# Patient Record
Sex: FEMALE | Race: BLACK OR AFRICAN AMERICAN | NOT HISPANIC OR LATINO
[De-identification: names, ages, dates, MRNs, and addresses within clinical notes are randomized per-mention and may not be internally consistent; named-entity substitution may affect disease eponyms.]

---

## 2019-08-14 ENCOUNTER — APPOINTMENT (OUTPATIENT)
Dept: PULMONOLOGY | Facility: CLINIC | Age: 64
End: 2019-08-14
Payer: COMMERCIAL

## 2019-08-14 ENCOUNTER — FORM ENCOUNTER (OUTPATIENT)
Age: 64
End: 2019-08-14

## 2019-08-14 VITALS
DIASTOLIC BLOOD PRESSURE: 78 MMHG | OXYGEN SATURATION: 96 % | HEART RATE: 64 BPM | SYSTOLIC BLOOD PRESSURE: 120 MMHG | RESPIRATION RATE: 16 BRPM | WEIGHT: 172 LBS | BODY MASS INDEX: 33.77 KG/M2 | HEIGHT: 59.65 IN | TEMPERATURE: 97.8 F

## 2019-08-14 PROCEDURE — 99204 OFFICE O/P NEW MOD 45 MIN: CPT

## 2019-08-14 NOTE — PHYSICAL EXAM
[General Appearance - Well Developed] : well developed [Normal Conjunctiva] : the conjunctiva exhibited no abnormalities [Normal Oropharynx] : normal oropharynx [Jugular Venous Distention Increased] : there was no jugular-venous distention [Heart Rate And Rhythm] : heart rate and rhythm were normal [Heart Sounds] : normal S1 and S2 [Murmurs] : no murmurs present [Edema] : no peripheral edema present [Respiration, Rhythm And Depth] : normal respiratory rhythm and effort [Abdomen Soft] : soft [Auscultation Breath Sounds / Voice Sounds] : lungs were clear to auscultation bilaterally [Abnormal Walk] : normal gait [Abdomen Tenderness] : non-tender [Nail Clubbing] : no clubbing of the fingernails [Motor Exam] : the motor exam was normal [] : no rash [Oriented To Time, Place, And Person] : oriented to person, place, and time [Erythema] : no erythema of the pharynx

## 2019-08-14 NOTE — REVIEW OF SYSTEMS
[Nasal Congestion] : nasal congestion [Postnasal Drip] : postnasal drip [Hypertension] : ~T hypertension [Heartburn] : heartburn [Reflux] : reflux [Negative] : Sleep Disorder [Fever] : no fever [Chills] : no chills [Chest Discomfort] : no chest discomfort [PND] : no PND [Orthopnea] : no orthopnea

## 2019-08-14 NOTE — HISTORY OF PRESENT ILLNESS
[FreeTextEntry1] : 63 yo woman with history of cryptogenic organizing pneumonia diagnosed in 2010 with VATS biopsy who comes for follow up. Last seen in 2014. She is been off prednisone since 2012. She started following at [facility] as it is close to her but comes today because she started experiencing cough again since January similar to when she presented with . No fever or chills. No shortness of breath. Reports cough is mostly dry. No clear trigger. Reports nasal congestion and postnasal drip and has tried antiallergic medications and Flonase with no significant relief, however has not been using the Flonase consistently. Also complains of heartburn and was started on ranitidine since May without improvement. No ACE inhibitors. No exposure to sick contacts.

## 2019-08-14 NOTE — ASSESSMENT
[FreeTextEntry1] : 63 yo woman with history of cryptogenic organizing pneumonia diagnosed in 2010 with VATS biopsy who comes for follow up. Off prednisone since 2012. Last seen in 2014 and then started following at [facility]. Comes today for evaluation of persistent cough since January. Reports symptoms of GERD and postnasal drip which are likely responsible for her symptoms. Will start treatment for both but also obtain PFTs and imaging given history of . \par \par Plan:\par - Start pantoprazole 40 mg BID instead of ranitidine given reported symptoms of GERD\par - Start Flonase 2 sprays in each nostril BID\par - Obtain pulmonary function tests\par - Obtain CT chest without contrast given history of cryptogenic organizing pneumonia\par \par Follow up after PFTs and CT chest is done

## 2019-08-15 ENCOUNTER — APPOINTMENT (OUTPATIENT)
Dept: PULMONOLOGY | Facility: CLINIC | Age: 64
End: 2019-08-15
Payer: COMMERCIAL

## 2019-08-15 ENCOUNTER — OTHER (OUTPATIENT)
Age: 64
End: 2019-08-15

## 2019-08-15 ENCOUNTER — LABORATORY RESULT (OUTPATIENT)
Age: 64
End: 2019-08-15

## 2019-08-15 ENCOUNTER — OUTPATIENT (OUTPATIENT)
Dept: OUTPATIENT SERVICES | Facility: HOSPITAL | Age: 64
LOS: 1 days | End: 2019-08-15
Payer: COMMERCIAL

## 2019-08-15 ENCOUNTER — APPOINTMENT (OUTPATIENT)
Dept: CT IMAGING | Facility: IMAGING CENTER | Age: 64
End: 2019-08-15
Payer: COMMERCIAL

## 2019-08-15 DIAGNOSIS — J84.116 CRYPTOGENIC ORGANIZING PNEUMONIA: ICD-10-CM

## 2019-08-15 PROCEDURE — 71250 CT THORAX DX C-: CPT

## 2019-08-15 PROCEDURE — 94729 DIFFUSING CAPACITY: CPT

## 2019-08-15 PROCEDURE — 71250 CT THORAX DX C-: CPT | Mod: 26

## 2019-08-15 PROCEDURE — 94726 PLETHYSMOGRAPHY LUNG VOLUMES: CPT

## 2019-08-15 PROCEDURE — 94060 EVALUATION OF WHEEZING: CPT

## 2019-08-15 NOTE — HISTORY OF PRESENT ILLNESS
[FreeTextEntry1] : Ms Case is a 59 yr old F with a PMH of  diagnosed in 2010 with a VATS biopsy, off steroids and supplemental O2 since 2012 presented for follow up yesterday 8/14/19 with recurrence of chronic cough that started 1/2019. Cough is dry and associated with dyspnea with exertion. Also has burning sensation in her chest after eating late at night is prescribed a PPI. Started taking Flonase as well. In early July she had a short course of levofloxacin and prednisone, which did not alleviate symptoms.\par \par Was sent for PFTs and CT chest yesterday for further evaluation. Compared to 2014, her PFTs are worse (see below). Her CT chest is also worse compared to 2014 but improved since her initial CT in 2010.\par \par 2014:\par FEV1 85% (1.56)\par FVC 75% (1.74)\par TLC 61% (2.34)\par DLCO 44%\par \par 2019\par FEV1 76% (1.33)\par FVC 67 (1.42)\par TLC 50% (1.95)\par DLCO 35%

## 2019-08-15 NOTE — PHYSICAL EXAM
[General Appearance - Well Developed] : well developed [Normal Conjunctiva] : the conjunctiva exhibited no abnormalities [Normal Oropharynx] : normal oropharynx [Jugular Venous Distention Increased] : there was no jugular-venous distention [Heart Rate And Rhythm] : heart rate and rhythm were normal [Murmurs] : no murmurs present [Heart Sounds] : normal S1 and S2 [Edema] : no peripheral edema present [Respiration, Rhythm And Depth] : normal respiratory rhythm and effort [Abdomen Soft] : soft [Abdomen Tenderness] : non-tender [Auscultation Breath Sounds / Voice Sounds] : lungs were clear to auscultation bilaterally [Abnormal Walk] : normal gait [Nail Clubbing] : no clubbing of the fingernails [Motor Exam] : the motor exam was normal [] : no rash [Oriented To Time, Place, And Person] : oriented to person, place, and time [Erythema] : no erythema of the pharynx

## 2019-08-15 NOTE — REVIEW OF SYSTEMS
[Nasal Congestion] : nasal congestion [Hypertension] : ~T hypertension [Postnasal Drip] : postnasal drip [Heartburn] : heartburn [Reflux] : reflux [Negative] : Sleep Disorder [Fever] : no fever [Chills] : no chills [Chest Discomfort] : no chest discomfort [PND] : no PND [Orthopnea] : no orthopnea

## 2019-08-15 NOTE — ASSESSMENT
[FreeTextEntry1] : 65 yo woman with history of cryptogenic organizing pneumonia diagnosed in 2010 with VATS biopsy who comes for follow up. Off prednisone since 2012. Last seen in 2014 and then started following at [facility]. Presented yesterday to re-establish care given chronic cough that has been ongoing since 1/2019. PFTs and CT chest concerning for recurrence of disease.\par \par Plan:\par - Continue PPI (omeprazole 40)\par - Flonase 2 sprays in each nostril BID\par - Start prednisone 30 mg daily x 2 weeks (tapering schedule to be determined based on clinical response)\par - Repeat PFTs in 2 weeks\par - Check CBC and CMP today\par \par Will also revaluate for hypersensitivity pneumonitis and connective tissue diseases\par \par Follow up visit in 2 weeks after PFT\par

## 2019-09-03 ENCOUNTER — LABORATORY RESULT (OUTPATIENT)
Age: 64
End: 2019-09-03

## 2019-09-03 ENCOUNTER — APPOINTMENT (OUTPATIENT)
Dept: PULMONOLOGY | Facility: CLINIC | Age: 64
End: 2019-09-03
Payer: COMMERCIAL

## 2019-09-03 VITALS — HEIGHT: 61 IN | WEIGHT: 176 LBS | BODY MASS INDEX: 33.23 KG/M2

## 2019-09-03 VITALS
HEIGHT: 61 IN | WEIGHT: 176 LBS | TEMPERATURE: 99.6 F | BODY MASS INDEX: 33.23 KG/M2 | SYSTOLIC BLOOD PRESSURE: 108 MMHG | HEART RATE: 78 BPM | RESPIRATION RATE: 16 BRPM | DIASTOLIC BLOOD PRESSURE: 72 MMHG

## 2019-09-03 DIAGNOSIS — F41.9 ANXIETY DISORDER, UNSPECIFIED: ICD-10-CM

## 2019-09-03 PROCEDURE — 99214 OFFICE O/P EST MOD 30 MIN: CPT | Mod: GC,25

## 2019-09-03 PROCEDURE — 94726 PLETHYSMOGRAPHY LUNG VOLUMES: CPT | Mod: GC

## 2019-09-03 PROCEDURE — 94729 DIFFUSING CAPACITY: CPT | Mod: GC

## 2019-09-03 PROCEDURE — 94010 BREATHING CAPACITY TEST: CPT | Mod: GC

## 2019-09-03 PROCEDURE — ZZZZZ: CPT

## 2019-09-03 NOTE — HISTORY OF PRESENT ILLNESS
[FreeTextEntry1] : 59F hx  (2010) w/VATS off steroids and O2 2012 who comes for follow up. Last seen in clinic on 8/15/2019. At that time, she was c/o cough since 1/2019. She started PPI for GERD and Flonase.  Also started on prednisone 30mg qd x 2 weeks. Labs drawn and are normal, except mild elevation in lymphocytes. Today she states that her symptoms improved after steroid taper (has been off for several days now). Cough had nearly resolved and pt did not notice much SOB with exertion. But since steroids have ended, cough and ROWLAND have returned. Has some sciatica pain with coughing but otherwise denies new weight loss/gain, fevers, chest pain, abdominal pain, nausea/vomiting, diarrhea, b/l LE edema, joint pain, new rashes. 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 No new pets. \par \par PFTs 8/15: FEV1/FVC 89%, FEV1 76%, FVC 67%, no bronchodilatory change, TLC 50%, ERV 0.18, DLCO 35%\par PFTs 2014: FEV1/FVC 90%, FEV1 85%, FVC 75%, no bronchodilatory change; TLC 61%, ERV 0.08, DLCO 44%\par \par CT Chest 8/16/2019: s/p wedge resection LLL and lingula, b/l GGO and nodular opacities  - peribronchovascular distribution involving all lobes predominantly LLLL and lingula; bronchiectasis of lingula and LLL.

## 2019-09-03 NOTE — ASSESSMENT
[FreeTextEntry1] : 59F hx  (2010) w/VATS off steroids and O2 2012 who comes for f/u of cough and deterioration of lung functions and CT chest.  Thought to be relapse of . Prev visit, she was started on steroids, which helped her cough/ROWLAND. Since coming off the steroids cough and ROWLAND has returned. CT Chest with worsening b/l GG and nodular opacities - likely return of her . PFTs remain about the same. No symptoms to suggest that she has a rheumatic DO. Has not recently moved, no mold infestation, pets or new job. Unclear what is inciting the recurrence. \par To look for possible  etiology\par - hypersensitivity panel\par - rheum workup\par - will restart prednisone 30mg x 1 week, then 20mg x 1 month\par - return to office w/PFTs prior to finishing steroid course

## 2019-09-03 NOTE — REVIEW OF SYSTEMS
[Cough] : cough [Sputum] : sputum  [Dyspnea] : dyspnea [Negative] : Psychiatric [Hemoptysis] : no hemoptysis [Chest Tightness] : no chest tightness [Pleuritic Pain] : no pleuritic pain [Wheezing] : no wheezing

## 2019-09-03 NOTE — PHYSICAL EXAM
[Normal Appearance] : normal appearance [General Appearance - Well Developed] : well developed [Normal Conjunctiva] : the conjunctiva exhibited no abnormalities [General Appearance - Well Nourished] : well nourished [Neck Appearance] : the appearance of the neck was normal [Heart Rate And Rhythm] : heart rate and rhythm were normal [Jugular Venous Distention Increased] : there was no jugular-venous distention [Heart Sounds] : normal S1 and S2 [Murmurs] : no murmurs present [Auscultation Breath Sounds / Voice Sounds] : lungs were clear to auscultation bilaterally [Abdomen Soft] : soft [Bowel Sounds] : normal bowel sounds [Abdomen Tenderness] : non-tender [Nail Clubbing] : no clubbing of the fingernails [Abnormal Walk] : normal gait [Cyanosis, Localized] : no localized cyanosis [] : no rash [Skin Turgor] : normal skin turgor [Motor Exam] : the motor exam was normal [No Focal Deficits] : no focal deficits [Oriented To Time, Place, And Person] : oriented to person, place, and time [Mood] : the mood was normal

## 2019-10-01 ENCOUNTER — APPOINTMENT (OUTPATIENT)
Dept: PULMONOLOGY | Facility: CLINIC | Age: 64
End: 2019-10-01
Payer: COMMERCIAL

## 2019-10-01 VITALS — WEIGHT: 176 LBS | HEIGHT: 61 IN | HEART RATE: 67 BPM | OXYGEN SATURATION: 94 % | BODY MASS INDEX: 33.23 KG/M2

## 2019-10-01 VITALS
TEMPERATURE: 98.4 F | DIASTOLIC BLOOD PRESSURE: 86 MMHG | HEART RATE: 74 BPM | SYSTOLIC BLOOD PRESSURE: 124 MMHG | BODY MASS INDEX: 33.61 KG/M2 | HEIGHT: 61 IN | OXYGEN SATURATION: 93 % | RESPIRATION RATE: 15 BRPM | WEIGHT: 178 LBS

## 2019-10-01 PROCEDURE — 99214 OFFICE O/P EST MOD 30 MIN: CPT | Mod: 25

## 2019-10-01 PROCEDURE — 94729 DIFFUSING CAPACITY: CPT

## 2019-10-01 PROCEDURE — 94010 BREATHING CAPACITY TEST: CPT

## 2019-10-01 PROCEDURE — ZZZZZ: CPT

## 2019-10-01 PROCEDURE — 94726 PLETHYSMOGRAPHY LUNG VOLUMES: CPT

## 2019-10-01 NOTE — HISTORY OF PRESENT ILLNESS
[FreeTextEntry1] : 63yo female with h/o  diagnosed by lung biopsy with VATS (2010) off steroids and supplemental oxygen (2012).  Presents today with cough since January 2019.  Started on PPI for GERD and Flonase.  Blood work for hypersensitivity and rheum w/u unremarkable.  Has been on prednisone 20mg for one month now.  Cough had resolved when initially at 30mg, but has returned since on 20mg.  Admits to dyspnea with brisk walking and going up an incline.  Has some sciatic pain, but no other systemic symptoms noted.\par \par CT Chest 8/16/2019: \par s/p wedge resection LLL and lingula, b/l GGO and nodular opacities  - peribronchovascular distribution involving all lobes predominantly LLL and lingula; bronchiectasis of lingula and LLL.

## 2019-10-01 NOTE — ASSESSMENT
[FreeTextEntry1] : 65yo female with h/o  diagnosed by lung biopsy with VATS (2010) off steroids and supplemental oxygen (2012).  Thought to be relapse of .  Has been on prednisone 20mg x1 month now, but still with cough and some exertional dyspnea.  She waked 320 feet and desaturated from 95-87%, but resaturated to 95% upon resting for 30 seconds.  CT chest with worsening b/l ground glass and nodular opacities, thought to be return of , though no obvious causes at this time.  PFT today unchanged from last month, despite prednisone.  Fluticasone and omeprazole reordered today.  Plan to taper prednisone to 10mg QD x2 weeks, then 10mg QOD x2 weeks, then stop.  F/u in 5 weeks with repeat chest CT at that time.

## 2019-10-01 NOTE — PHYSICAL EXAM
[Normal Appearance] : normal appearance [Normal Conjunctiva] : the conjunctiva exhibited no abnormalities [Neck Appearance] : the appearance of the neck was normal [Heart Rate And Rhythm] : heart rate and rhythm were normal [Heart Sounds] : normal S1 and S2 [Murmurs] : no murmurs present [Auscultation Breath Sounds / Voice Sounds] : lungs were clear to auscultation bilaterally [Bowel Sounds] : normal bowel sounds [Abdomen Soft] : soft [Abdomen Tenderness] : non-tender [Abnormal Walk] : normal gait [Nail Clubbing] : no clubbing of the fingernails [Cyanosis, Localized] : no localized cyanosis [Skin Turgor] : normal skin turgor [] : no rash [Motor Exam] : the motor exam was normal [No Focal Deficits] : no focal deficits [Oriented To Time, Place, And Person] : oriented to person, place, and time [Mood] : the mood was normal [General Appearance - In No Acute Distress] : no acute distress [Normal Oropharynx] : normal oropharynx [Arterial Pulses Normal] : the arterial pulses were normal [Edema] : no peripheral edema present [Respiration, Rhythm And Depth] : normal respiratory rhythm and effort [Exaggerated Use Of Accessory Muscles For Inspiration] : no accessory muscle use

## 2019-11-05 ENCOUNTER — APPOINTMENT (OUTPATIENT)
Dept: PULMONOLOGY | Facility: CLINIC | Age: 64
End: 2019-11-05
Payer: COMMERCIAL

## 2019-11-05 VITALS
OXYGEN SATURATION: 90 % | HEART RATE: 96 BPM | BODY MASS INDEX: 33.23 KG/M2 | SYSTOLIC BLOOD PRESSURE: 119 MMHG | TEMPERATURE: 97 F | WEIGHT: 176 LBS | RESPIRATION RATE: 18 BRPM | HEIGHT: 61 IN | DIASTOLIC BLOOD PRESSURE: 78 MMHG

## 2019-11-05 DIAGNOSIS — K21.9 GASTRO-ESOPHAGEAL REFLUX DISEASE W/OUT ESOPHAGITIS: ICD-10-CM

## 2019-11-05 PROCEDURE — 99215 OFFICE O/P EST HI 40 MIN: CPT | Mod: GC

## 2019-11-05 NOTE — REVIEW OF SYSTEMS
[Cough] : cough [Dyspnea] : dyspnea [Negative] : Endocrine [Fever] : no fever [Chills] : no chills [Fatigue] : no fatigue [Sputum] : not coughing up ~M sputum

## 2019-11-05 NOTE — PHYSICAL EXAM
[General Appearance - Well Developed] : well developed [Normal Appearance] : normal appearance [General Appearance - Well Nourished] : well nourished [Normal Conjunctiva] : the conjunctiva exhibited no abnormalities [Normal Oropharynx] : normal oropharynx [Neck Cervical Mass (___cm)] : no neck mass was observed [Jugular Venous Distention Increased] : there was no jugular-venous distention [Heart Rate And Rhythm] : heart rate and rhythm were normal [Heart Sounds] : normal S1 and S2 [Murmurs] : no murmurs present [Auscultation Breath Sounds / Voice Sounds] : lungs were clear to auscultation bilaterally [Abdomen Soft] : soft [Abdomen Tenderness] : non-tender [Abnormal Walk] : normal gait [Nail Clubbing] : no clubbing of the fingernails [Cyanosis, Localized] : no localized cyanosis [Skin Turgor] : normal skin turgor [] : no rash [Motor Exam] : the motor exam was normal [No Focal Deficits] : no focal deficits [Oriented To Time, Place, And Person] : oriented to person, place, and time [Mood] : the mood was normal [FreeTextEntry2] : no b/l LE edema

## 2019-11-05 NOTE — HISTORY OF PRESENT ILLNESS
[FreeTextEntry1] : 64F hx  (dx'ed 2010) req steroids and O2, who comes for follow up. Last seen in 10/1/2019 - at that time she was on 20mg prednisone. She desaturated with exertion to 87%. Today she states that her coughing is bothering her, ROWLAND with 1 flight of stairs and talking. Cough is usually non-productive but can be yellowish at times. On prednisone 10mg q48h. PCP recently gave her codeine for cough, which was helpful. Found that there is no difference in her symptoms between 10mg q48h and 20mg qd. Cough worse in AM/PM and when she talks or walks. Denies fevers, chills, chest pain, SOB, abdominal pain, nausea/vomiting, diarrhea, b/l LE edema.\par \par CT Chest 8/16/2019: s/p wedge resection of LLL, lingula. b/l GGO and nodular opacities peribronchial vascular distribution involving all lobes, predominantly LLL and lingula. Bronchiectasis lingula and LLL. \par PFTs 10/1/2019: FEV1/FVC 86%, FEV1 73%, FVC 66%, TLC 49%, DLCO 41%

## 2019-11-05 NOTE — ASSESSMENT
[FreeTextEntry1] : 64F hx  (dx'ed 2010) req steroids and O2, who comes for follow up. Prednisone 20mg now tapered to 10mg q48hr, no difference in symptoms. Still having cough, SOB/ROWLAND. CT chest appears worse. PFTs have not changed much. Pt still hypoxic to 90% after walking from waiting area to exam room. Concern for recurrent , not improving with steroids. There have been some case studies showing efficacy of azithromycin for recurrent  ("Macrolide therapy in cryptogenic organizing pneumonia: A case report and literature review" by Christopher et al). \par - will try azithromycin 500 mg qd\par - refilled omeprazole for reflux\par - trial of flonase

## 2019-12-03 ENCOUNTER — APPOINTMENT (OUTPATIENT)
Dept: PULMONOLOGY | Facility: CLINIC | Age: 64
End: 2019-12-03
Payer: COMMERCIAL

## 2019-12-03 VITALS
BODY MASS INDEX: 33.35 KG/M2 | TEMPERATURE: 97.4 F | RESPIRATION RATE: 17 BRPM | SYSTOLIC BLOOD PRESSURE: 109 MMHG | HEART RATE: 83 BPM | OXYGEN SATURATION: 95 % | WEIGHT: 176.5 LBS | DIASTOLIC BLOOD PRESSURE: 68 MMHG

## 2019-12-03 PROCEDURE — 99215 OFFICE O/P EST HI 40 MIN: CPT

## 2019-12-03 RX ORDER — OMEPRAZOLE 40 MG/1
40 CAPSULE, DELAYED RELEASE ORAL
Qty: 30 | Refills: 2 | Status: ACTIVE | COMMUNITY
Start: 2019-08-14 | End: 1900-01-01

## 2019-12-03 RX ORDER — PREDNISONE 10 MG/1
10 TABLET ORAL
Qty: 50 | Refills: 0 | Status: DISCONTINUED | COMMUNITY
Start: 2019-08-15 | End: 2019-12-03

## 2019-12-03 RX ORDER — PREDNISONE 10 MG/1
10 TABLET ORAL
Qty: 100 | Refills: 1 | Status: ACTIVE | COMMUNITY
Start: 2019-12-03 | End: 1900-01-01

## 2019-12-03 NOTE — HISTORY OF PRESENT ILLNESS
[FreeTextEntry1] : 65yo female with h/o  diagnosed by lung biopsy with VATS (2019) off steroids and supplemental oxygen (2012). Last seen in 11/5/19.  At that time, prednisone had been discontinued due to lack of response to therapy.  She was started on trial of azithromycin 500mg QD for possible recurrent .  Additionally, she was placed on omeprazole for GERD and Flonase for UAC, which she stopped using.  She does not feel any difference in the characters tics of her cough.  It is mostly dry and intermittent throughout the day.  It is worse with talking and associated with a tickle in her throat.  Also reports dyspnea that she feels is worse from last month.  Recent  cardiac w/u unremarkable, although we currently have no records on file.\par \par ILD blood workup was unremarkable except for a mildly elevated ESR.  PFT showed moderately restrictive pattern with a severely reduced diffusing capacity.

## 2019-12-03 NOTE — PHYSICAL EXAM
[Normal Appearance] : normal appearance [General Appearance - Well Developed] : well developed [General Appearance - Well Nourished] : well nourished [Normal Conjunctiva] : the conjunctiva exhibited no abnormalities [Normal Oropharynx] : normal oropharynx [Heart Rate And Rhythm] : heart rate and rhythm were normal [Murmurs] : no murmurs present [Heart Sounds] : normal S1 and S2 [Auscultation Breath Sounds / Voice Sounds] : lungs were clear to auscultation bilaterally [Abnormal Walk] : normal gait [Abdomen Soft] : soft [Abdomen Tenderness] : non-tender [Cyanosis, Localized] : no localized cyanosis [Nail Clubbing] : no clubbing of the fingernails [] : no rash [Skin Turgor] : normal skin turgor [Oriented To Time, Place, And Person] : oriented to person, place, and time [Motor Exam] : the motor exam was normal [No Focal Deficits] : no focal deficits [Mood] : the mood was normal [General Appearance - In No Acute Distress] : no acute distress [Arterial Pulses Normal] : the arterial pulses were normal [Edema] : no peripheral edema present [Exaggerated Use Of Accessory Muscles For Inspiration] : no accessory muscle use [Respiration, Rhythm And Depth] : normal respiratory rhythm and effort [Affect] : the affect was normal

## 2019-12-03 NOTE — ASSESSMENT
[FreeTextEntry1] : 65yo female with h/o  diagnosed by lung biopsy with VATS (2010). Treated with steroids; off steroids and supplemental oxygen (2012).  Currently being seen for dry cough with recent chest imaging suggestive of recurrence of , which has not responded to steroids as demonstrated on most recent PFTs.  She was on prednisone 30mg x2 weeks then dropped to 20mg and then 10mg over the course of one month.  Will plan to restart prednisone at 30mg x4 weeks with repeat PFT and f/u at that time.  May need slower taper.  Continue with omeprazole and Flonase.\par Azithromycin of no benefit\par Concern that  is not responding to treatment

## 2019-12-05 ENCOUNTER — RX RENEWAL (OUTPATIENT)
Age: 64
End: 2019-12-05

## 2019-12-19 ENCOUNTER — APPOINTMENT (OUTPATIENT)
Dept: PULMONOLOGY | Facility: CLINIC | Age: 64
End: 2019-12-19
Payer: COMMERCIAL

## 2019-12-19 VITALS
WEIGHT: 174 LBS | RESPIRATION RATE: 16 BRPM | SYSTOLIC BLOOD PRESSURE: 117 MMHG | HEIGHT: 61 IN | BODY MASS INDEX: 32.85 KG/M2 | OXYGEN SATURATION: 96 % | HEART RATE: 72 BPM | DIASTOLIC BLOOD PRESSURE: 74 MMHG

## 2019-12-19 PROCEDURE — 99213 OFFICE O/P EST LOW 20 MIN: CPT | Mod: GC

## 2019-12-19 NOTE — PHYSICAL EXAM
[General Appearance - Well Developed] : well developed [Normal Appearance] : normal appearance [General Appearance - Well Nourished] : well nourished [General Appearance - In No Acute Distress] : no acute distress [Normal Conjunctiva] : the conjunctiva exhibited no abnormalities [Normal Oropharynx] : normal oropharynx [Heart Rate And Rhythm] : heart rate and rhythm were normal [Heart Sounds] : normal S1 and S2 [Murmurs] : no murmurs present [Arterial Pulses Normal] : the arterial pulses were normal [Edema] : no peripheral edema present [Respiration, Rhythm And Depth] : normal respiratory rhythm and effort [Exaggerated Use Of Accessory Muscles For Inspiration] : no accessory muscle use [Auscultation Breath Sounds / Voice Sounds] : lungs were clear to auscultation bilaterally [Abdomen Soft] : soft [Abdomen Tenderness] : non-tender [Abnormal Walk] : normal gait [Nail Clubbing] : no clubbing of the fingernails [Cyanosis, Localized] : no localized cyanosis [Skin Turgor] : normal skin turgor [] : no rash [Motor Exam] : the motor exam was normal [No Focal Deficits] : no focal deficits [Oriented To Time, Place, And Person] : oriented to person, place, and time [Affect] : the affect was normal [Mood] : the mood was normal

## 2019-12-19 NOTE — HISTORY OF PRESENT ILLNESS
[FreeTextEntry1] : 64 F with history of  diagnosed by lung biopsy with VATS (2010) off steroids and supplemental oxygen (2012). Lost to follow up but presented again with recurrent symptoms. Last visit was 12/3/19 during which time she was restarted on prednisone 30 mg daily. Has been taking every day and reports cough is improved but she remains dyspneic. \par \par She has been on prednisone multiple times in past with limited benefit. She was also on trial of azithromycin 500mg QD for recurrent .  Additionally, placed on omeprazole for GERD and Flonase for UAC, which she stopped using. Had recent cardiac w/u unremarkable, although we currently have no records on file (was done at [facility] per patinent).\par \par ILD blood workup was unremarkable except for a mildly elevated ESR.  PFT showed moderately restrictive pattern with a severely reduced diffusing capacity.

## 2019-12-19 NOTE — ASSESSMENT
[FreeTextEntry1] : 64 F with h/o  diagnosed by lung biopsy with VATS (2010).  Recently restarted prednisone 30 mg daily 12/3/19. Cough improved but still dyspneic. \par \par Will need to recheck PFTs prior to tapering steroids\par Filled out forms for patient today re: FMLA\par Follow up visit end of month or early January for repeat PFTs

## 2019-12-23 ENCOUNTER — MESSAGE (OUTPATIENT)
Age: 64
End: 2019-12-23

## 2020-01-14 ENCOUNTER — EMERGENCY (EMERGENCY)
Facility: HOSPITAL | Age: 65
LOS: 1 days | Discharge: HOME | End: 2020-01-14
Attending: EMERGENCY MEDICINE | Admitting: EMERGENCY MEDICINE
Payer: COMMERCIAL

## 2020-01-14 ENCOUNTER — APPOINTMENT (OUTPATIENT)
Dept: PULMONOLOGY | Facility: CLINIC | Age: 65
End: 2020-01-14
Payer: COMMERCIAL

## 2020-01-14 VITALS
SYSTOLIC BLOOD PRESSURE: 127 MMHG | HEART RATE: 59 BPM | DIASTOLIC BLOOD PRESSURE: 61 MMHG | RESPIRATION RATE: 18 BRPM | TEMPERATURE: 98 F | OXYGEN SATURATION: 100 %

## 2020-01-14 VITALS
TEMPERATURE: 98 F | RESPIRATION RATE: 20 BRPM | HEART RATE: 93 BPM | DIASTOLIC BLOOD PRESSURE: 73 MMHG | OXYGEN SATURATION: 98 % | SYSTOLIC BLOOD PRESSURE: 158 MMHG

## 2020-01-14 LAB
ALBUMIN SERPL ELPH-MCNC: 4 G/DL — SIGNIFICANT CHANGE UP (ref 3.3–5)
ALP SERPL-CCNC: 54 U/L — SIGNIFICANT CHANGE UP (ref 40–120)
ALT FLD-CCNC: 32 U/L — SIGNIFICANT CHANGE UP (ref 4–33)
ANION GAP SERPL CALC-SCNC: 12 MMO/L — SIGNIFICANT CHANGE UP (ref 7–14)
AST SERPL-CCNC: 22 U/L — SIGNIFICANT CHANGE UP (ref 4–32)
BASOPHILS # BLD AUTO: 0.01 K/UL — SIGNIFICANT CHANGE UP (ref 0–0.2)
BASOPHILS NFR BLD AUTO: 0.1 % — SIGNIFICANT CHANGE UP (ref 0–2)
BILIRUB SERPL-MCNC: 0.2 MG/DL — SIGNIFICANT CHANGE UP (ref 0.2–1.2)
BUN SERPL-MCNC: 14 MG/DL — SIGNIFICANT CHANGE UP (ref 7–23)
CALCIUM SERPL-MCNC: 9.4 MG/DL — SIGNIFICANT CHANGE UP (ref 8.4–10.5)
CHLORIDE SERPL-SCNC: 103 MMOL/L — SIGNIFICANT CHANGE UP (ref 98–107)
CO2 SERPL-SCNC: 23 MMOL/L — SIGNIFICANT CHANGE UP (ref 22–31)
CREAT SERPL-MCNC: 1.07 MG/DL — SIGNIFICANT CHANGE UP (ref 0.5–1.3)
EOSINOPHIL # BLD AUTO: 0.16 K/UL — SIGNIFICANT CHANGE UP (ref 0–0.5)
EOSINOPHIL NFR BLD AUTO: 1.9 % — SIGNIFICANT CHANGE UP (ref 0–6)
GLUCOSE SERPL-MCNC: 147 MG/DL — HIGH (ref 70–99)
HCT VFR BLD CALC: 43 % — SIGNIFICANT CHANGE UP (ref 34.5–45)
HGB BLD-MCNC: 13.2 G/DL — SIGNIFICANT CHANGE UP (ref 11.5–15.5)
IMM GRANULOCYTES NFR BLD AUTO: 0.5 % — SIGNIFICANT CHANGE UP (ref 0–1.5)
LYMPHOCYTES # BLD AUTO: 1.01 K/UL — SIGNIFICANT CHANGE UP (ref 1–3.3)
LYMPHOCYTES # BLD AUTO: 11.9 % — LOW (ref 13–44)
MCHC RBC-ENTMCNC: 23.8 PG — LOW (ref 27–34)
MCHC RBC-ENTMCNC: 30.7 % — LOW (ref 32–36)
MCV RBC AUTO: 77.6 FL — LOW (ref 80–100)
MONOCYTES # BLD AUTO: 0.28 K/UL — SIGNIFICANT CHANGE UP (ref 0–0.9)
MONOCYTES NFR BLD AUTO: 3.3 % — SIGNIFICANT CHANGE UP (ref 2–14)
NEUTROPHILS # BLD AUTO: 6.98 K/UL — SIGNIFICANT CHANGE UP (ref 1.8–7.4)
NEUTROPHILS NFR BLD AUTO: 82.3 % — HIGH (ref 43–77)
NRBC # FLD: 0 K/UL — SIGNIFICANT CHANGE UP (ref 0–0)
NT-PROBNP SERPL-SCNC: 17.05 PG/ML — SIGNIFICANT CHANGE UP
PLATELET # BLD AUTO: 202 K/UL — SIGNIFICANT CHANGE UP (ref 150–400)
PMV BLD: 10.4 FL — SIGNIFICANT CHANGE UP (ref 7–13)
POTASSIUM SERPL-MCNC: 4.4 MMOL/L — SIGNIFICANT CHANGE UP (ref 3.5–5.3)
POTASSIUM SERPL-SCNC: 4.4 MMOL/L — SIGNIFICANT CHANGE UP (ref 3.5–5.3)
PROT SERPL-MCNC: 6.9 G/DL — SIGNIFICANT CHANGE UP (ref 6–8.3)
RBC # BLD: 5.54 M/UL — HIGH (ref 3.8–5.2)
RBC # FLD: 15.4 % — HIGH (ref 10.3–14.5)
SODIUM SERPL-SCNC: 138 MMOL/L — SIGNIFICANT CHANGE UP (ref 135–145)
TROPONIN T, HIGH SENSITIVITY: < 6 NG/L — SIGNIFICANT CHANGE UP (ref ?–14)
WBC # BLD: 8.48 K/UL — SIGNIFICANT CHANGE UP (ref 3.8–10.5)
WBC # FLD AUTO: 8.48 K/UL — SIGNIFICANT CHANGE UP (ref 3.8–10.5)

## 2020-01-14 PROCEDURE — 94726 PLETHYSMOGRAPHY LUNG VOLUMES: CPT

## 2020-01-14 PROCEDURE — 71250 CT THORAX DX C-: CPT | Mod: 26

## 2020-01-14 PROCEDURE — 99284 EMERGENCY DEPT VISIT MOD MDM: CPT

## 2020-01-14 PROCEDURE — 94060 EVALUATION OF WHEEZING: CPT

## 2020-01-14 PROCEDURE — 94729 DIFFUSING CAPACITY: CPT

## 2020-01-14 PROCEDURE — 71046 X-RAY EXAM CHEST 2 VIEWS: CPT | Mod: 26

## 2020-01-14 NOTE — ED PROVIDER NOTE - CLINICAL SUMMARY MEDICAL DECISION MAKING FREE TEXT BOX
64F hx cryptogenic organizing pna on steroids presents with worsening sob, exacerbated today while getting PFTs and exerting herself. some assoc chest tightness. Likely exacerbation of the chronic Cryptogenic organizing pna. Plan: Cardiac Monitor, EKG, Labs/cardiac enzymes, CXR

## 2020-01-14 NOTE — ED PROVIDER NOTE - ATTENDING CONTRIBUTION TO CARE
komal: pt with past hx as noted. Was having PFTS today and noted increasing shortness of breath during the procedure. No other new sx. Pt has months of increased shortness of breath for which she is being followed by the pulmonary serivce. At time of evaluation, pt stated she was at baseline for shortness of breath. Can walk and did so inED; noted mild dyspnea with short distances (noted in OCtober 2019).   CXR did not reveal any new dx; given hx Ct chest obtained and plan was to contact pulmonary  when results return.

## 2020-01-14 NOTE — ED PROVIDER NOTE - PATIENT PORTAL LINK FT
You can access the FollowMyHealth Patient Portal offered by [facility] by registering at the following website: [email or web address]. By joining [facility]’s FollowMyHealth portal, you will also be able to view your health information using other applications (apps) compatible with our system.

## 2020-01-14 NOTE — ED PROVIDER NOTE - PROGRESS NOTE DETAILS
CASSIDY MANNING: pt states that shes feeling better, ct scan shows no PNA, she will be d/c and f/u with her pulm in the AM

## 2020-01-14 NOTE — ED ADULT NURSE NOTE - OBJECTIVE STATEMENT
Patient received to room 27, A&Ox4, respirations even and unlabored, skin warm and dry good for color, speaks in clear and full sentences. Patient has hx of cryptogenic organizing pneumonia, had lung test today, reports feels SOB, weakness and chest pressure since test. Denies chest pain, nausea, vomiting, fevers. Provider currently at bedside assessing patient.

## 2020-01-14 NOTE — ED PROVIDER NOTE - OBJECTIVE STATEMENT
64F hx cryptogenic organizing pna on steroids presents with worsening sob, exacerbated today while getting PFTs and exerting herself. some assoc chest tightness. Chest pain is not pleuritic in nature.  Denies palpitations, hemoptysis, leg swelling or pain, recent immobilization, recent travel, recent surgery or hospitalization, exogenous hormone use such as OCP, IUD or Injections.  Non-smoker.  Denies history of blood clots or clotting disorder.  Denies family history of blood clots or clotting disorder.

## 2020-01-21 RX ORDER — PREDNISONE 10 MG/1
10 TABLET ORAL
Qty: 200 | Refills: 1 | Status: DISCONTINUED | COMMUNITY
Start: 2019-09-03 | End: 2020-01-21

## 2020-02-27 ENCOUNTER — APPOINTMENT (OUTPATIENT)
Dept: PULMONOLOGY | Facility: CLINIC | Age: 65
End: 2020-02-27
Payer: COMMERCIAL

## 2020-02-27 VITALS
BODY MASS INDEX: 33.07 KG/M2 | WEIGHT: 175 LBS | OXYGEN SATURATION: 93 % | DIASTOLIC BLOOD PRESSURE: 86 MMHG | SYSTOLIC BLOOD PRESSURE: 153 MMHG | TEMPERATURE: 98.2 F | RESPIRATION RATE: 16 BRPM | HEART RATE: 93 BPM

## 2020-02-27 PROCEDURE — 99214 OFFICE O/P EST MOD 30 MIN: CPT | Mod: GC

## 2020-02-27 RX ORDER — AZITHROMYCIN 500 MG/1
500 TABLET, FILM COATED ORAL DAILY
Qty: 30 | Refills: 2 | Status: DISCONTINUED | COMMUNITY
Start: 2019-11-05 | End: 2020-02-27

## 2020-02-27 NOTE — PHYSICAL EXAM
[No Deformities] : no deformities [No Acute Distress] : no acute distress [Normal Oropharynx] : normal oropharynx [No Neck Mass] : no neck mass [Normal Appearance] : normal appearance [Normal Rate/Rhythm] : normal rate/rhythm [No Clubbing] : no clubbing [Normal S1, S2] : normal s1, s2 [No Edema] : no edema [TextBox_[id number]] : crackles in lower fields bilaterally

## 2020-02-27 NOTE — REVIEW OF SYSTEMS
[Cough] : cough [Dyspnea] : dyspnea [SOB on Exertion] : sob on exertion [Arthralgias] : arthralgias [Back Pain] : back pain [Chills] : no chills [Fever] : no fever [Eye Irritation] : no eye irritation [Sinus Problems] : no sinus problems [Sputum] : no sputum [Chest Discomfort] : no chest discomfort [Orthopnea] : no orthopnea [GERD] : no gerd [Abdominal Pain] : no abdominal pain [Raynaud] : no raynaud [Depression] : no depression [Anxiety] : no anxiety

## 2020-02-27 NOTE — HISTORY OF PRESENT ILLNESS
[TextBox_4] : 64 F with history of  diagnosed by lung biopsy with VATS (2010) off steroids and supplemental oxygen (2012). Lost to follow up but presented again with recurrent symptoms. Patient was previously on prednisone 30mg but reported only minimal improvemetn in her symptoms and was concerned regarding adverse effects of steroids. She called 1/21 and was advised to decrease the dose of prednsone. She took a lower dose the following day but then self discontinued prednisone. Since then her symptoms have been relatively stable but still with significant exertional dyspnea. Needs to rest after 6 steps. She also notes dry cough which is slightly improved. Her repeat PFTs show mild improvement but still with evidence of moderate restrictive ventilatory impairment with severely decreased DLCO. \par \par PCP: Luci Leblanc\par Phone [phone]; Fax [phone]

## 2020-03-06 ENCOUNTER — APPOINTMENT (OUTPATIENT)
Dept: PULMONOLOGY | Facility: CLINIC | Age: 65
End: 2020-03-06
Payer: COMMERCIAL

## 2020-03-06 VITALS
BODY MASS INDEX: 33.11 KG/M2 | WEIGHT: 175.25 LBS | RESPIRATION RATE: 16 BRPM | HEART RATE: 96 BPM | OXYGEN SATURATION: 97 % | DIASTOLIC BLOOD PRESSURE: 86 MMHG | SYSTOLIC BLOOD PRESSURE: 145 MMHG | TEMPERATURE: 98 F

## 2020-03-06 PROCEDURE — 99213 OFFICE O/P EST LOW 20 MIN: CPT

## 2020-03-06 RX ORDER — PROMETHAZINE HYDROCHLORIDE AND CODEINE PHOSPHATE 6.25; 1 MG/5ML; MG/5ML
6.25-1 SOLUTION ORAL
Qty: 120 | Refills: 0 | Status: ACTIVE | COMMUNITY
Start: 2019-10-10

## 2020-03-06 RX ORDER — BENZONATATE 100 MG/1
100 CAPSULE ORAL
Qty: 30 | Refills: 0 | Status: ACTIVE | COMMUNITY
Start: 2020-02-05

## 2020-03-06 NOTE — REASON FOR VISIT
[Follow-Up] : a follow-up visit [Family Member] : family member [TextBox_44] : cryptogenic organizing pneumonia

## 2020-03-06 NOTE — PHYSICAL EXAM
[No Acute Distress] : no acute distress [Normal Appearance] : normal appearance [No Neck Mass] : no neck mass [Normal Rate/Rhythm] : normal rate/rhythm [Normal S1, S2] : normal s1, s2 [No Resp Distress] : no resp distress [Clear to Auscultation Bilaterally] : clear to auscultation bilaterally

## 2020-03-06 NOTE — HISTORY OF PRESENT ILLNESS
[TextBox_4] : 64-year-old female returns reporting improvement in her cough and dyspnea on prednisone. I restarted her on 15 mg of prednisone and the patient reports that she feels better. She reports that her cough and dyspnea have improved. She remains afebrile.

## 2020-03-06 NOTE — ASSESSMENT
[FreeTextEntry1] : the patient's resting oxygen saturation was 96%. After walking almost 200 feet, her oxygen saturations were 88% which is much better than when seen previously.\par She will maintain herself on 15 mg of prednisone and I will clear her to go back to work.

## 2020-05-19 ENCOUNTER — APPOINTMENT (OUTPATIENT)
Dept: PULMONOLOGY | Facility: CLINIC | Age: 65
End: 2020-05-19

## 2020-06-08 ENCOUNTER — APPOINTMENT (OUTPATIENT)
Dept: PULMONOLOGY | Facility: CLINIC | Age: 65
End: 2020-06-08
Payer: COMMERCIAL

## 2020-06-08 ENCOUNTER — APPOINTMENT (OUTPATIENT)
Dept: PULMONOLOGY | Facility: CLINIC | Age: 65
End: 2020-06-08

## 2020-06-08 DIAGNOSIS — M19.90 UNSPECIFIED OSTEOARTHRITIS, UNSPECIFIED SITE: ICD-10-CM

## 2020-06-08 PROCEDURE — 99213 OFFICE O/P EST LOW 20 MIN: CPT | Mod: 95

## 2020-06-08 RX ORDER — PREDNISONE 5 MG/1
5 TABLET ORAL
Qty: 30 | Refills: 1 | Status: ACTIVE | COMMUNITY
Start: 2020-06-08 | End: 1900-01-01

## 2020-06-08 NOTE — HISTORY OF PRESENT ILLNESS
[TextBox_4] : 64-year-old female with cryptogenic organizing pneumonia presents with cough and dyspnea on exertion. She is currently on prednisone\par 5 mg po daily for the last month.  Previously, in March was taking 15 mg po daily for cough but her symptoms have improved.  She does\par report dyspnea when she climbs 2 flights of stairs and a chronic cough.  She reports that it feels as though phlegm is getting stuck in\par her throat when she coughs at times.\par She has been back to work for the past month.  \par She denies fever, chills, chest tightness, wheezing, chest pain.  \par She does have b/l chronic leg pain and sciatica; has had an epidural in the past.  She felt the prednisone in higher doses was alleviating\par the pain and is now returning since she has decreased the dose.  Will follow up with her PCP and is also following neurosurgery.

## 2020-06-08 NOTE — REASON FOR VISIT
[Home] : at home, [unfilled] , at the time of the visit. [Medical Office: ([facility])___] : at the medical office located in  [Verbal consent obtained from patient] : the patient, [unfilled] [Family Member] : family member [Follow-Up] : a follow-up visit [TextBox_44] : cryptogenic organizing pneumonia

## 2020-06-08 NOTE — ASSESSMENT
[FreeTextEntry1] : 65 year old female with cryptogenic organizing pneumonia presents with chronic cough.\par \par Cryptogenic organizing pneumonia\par -decrease to prednisone 5 mg every other day- call in 2 weeks to provide update on symptoms\par -restart fluticasone nasal spray 2 sprays each nostril twice/day\par -follow up in 2 weeks

## 2020-07-20 LAB
ALTERN TENCAPG(M6): 5.1 MCG/ML
ANA SER IF-ACNC: NEGATIVE
ASPER FUMCAPG(M3): 10.7 MCG/ML
AUREOBASCAPG(M12): 2.5 MCG/ML
BASOPHILS # BLD AUTO: 0.07 K/UL
BASOPHILS NFR BLD AUTO: 1 %
CK SERPL-CCNC: 65 U/L
CRP SERPL-MCNC: 0.38 MG/DL
DSDNA AB SER-ACNC: <12 IU/ML
ENA JO1 AB SER IA-ACNC: <0.2 AL
ENA SCL70 IGG SER IA-ACNC: <0.2 AL
ENA SS-A AB SER IA-ACNC: <0.2 AL
ENA SS-B AB SER IA-ACNC: <0.2 AL
EOSINOPHIL # BLD AUTO: 0.36 K/UL
EOSINOPHIL NFR BLD AUTO: 5 %
ERYTHROCYTE [SEDIMENTATION RATE] IN BLOOD BY WESTERGREN METHOD: 49 MM/HR
HCT VFR BLD CALC: 43.3 %
HGB BLD-MCNC: 13.5 G/DL
IMM GRANULOCYTES NFR BLD AUTO: 0.3 %
LYMPHOCYTES # BLD AUTO: 3.27 K/UL
LYMPHOCYTES NFR BLD AUTO: 45.6 %
MAN DIFF?: NORMAL
MCHC RBC-ENTMCNC: 24.7 PG
MCHC RBC-ENTMCNC: 31.2 GM/DL
MCV RBC AUTO: 79.2 FL
MICROPOLYCAPG(M22): <2 MCG/ML
MONOCYTES # BLD AUTO: 0.61 K/UL
MONOCYTES NFR BLD AUTO: 8.5 %
MPO AB + PR3 PNL SER: NORMAL
NEUTROPHILS # BLD AUTO: 2.84 K/UL
NEUTROPHILS NFR BLD AUTO: 39.6 %
PENIC CHRYCAPG(M1): 5.7 MCG/ML
PHOMA BETAE IGG: 4.5 MCG/ML
PLATELET # BLD AUTO: 194 K/UL
RBC # BLD: 5.47 M/UL
RBC # FLD: 15.3 %
RHEUMATOID FACT SER QL: <10 IU/ML
THERMOCAPG(M23): 7.6 MCG/ML
TRICHODERMA VIRIDE IGG: 4.8 MCG/ML
WBC # FLD AUTO: 7.17 K/UL

## 2020-10-16 ENCOUNTER — APPOINTMENT (OUTPATIENT)
Dept: PULMONOLOGY | Facility: CLINIC | Age: 65
End: 2020-10-16
Payer: COMMERCIAL

## 2020-10-16 VITALS
SYSTOLIC BLOOD PRESSURE: 146 MMHG | DIASTOLIC BLOOD PRESSURE: 88 MMHG | OXYGEN SATURATION: 94 % | HEART RATE: 85 BPM | TEMPERATURE: 97.2 F | WEIGHT: 181 LBS | BODY MASS INDEX: 34.2 KG/M2 | RESPIRATION RATE: 19 BRPM

## 2020-10-16 DIAGNOSIS — Z87.440 PERSONAL HISTORY OF URINARY (TRACT) INFECTIONS: ICD-10-CM

## 2020-10-16 PROCEDURE — 99214 OFFICE O/P EST MOD 30 MIN: CPT

## 2020-10-16 NOTE — ASSESSMENT
[FreeTextEntry1] : the patient's resting oxygen saturation was 94% and after walking more than 300 feet, her oxygen saturation remained at 94%. These data are certainly him better than normal he last saw her. She is no longer taking prednisone and it does not appear that there is any acute change in her pulmonary status. Nevertheless I drew blood work and a urinalysis and ordered a followup CT scan

## 2020-10-16 NOTE — HISTORY OF PRESENT ILLNESS
[TextBox_4] : 65-year-old female under our care for cryptogenic organizing pneumonia last seen a number of months ago as we were tapering prednisone. The patient seemed to have stabilized restrictive lung disease associated with significant reduction in diffusing capacity with ambulatory hypoxemia. The patient was back to work and felt well until earlier this week when she felt lightheaded and weak and was taken by ambulance to [facility] where she was seen in the emergency room. She was diagnosed with a urinary tract infection and was initially given intravenous cephalosporin followed by oral Keflex which she is still taking. Apparently she did not have any urinary tract symptoms. She does feel dyspnea on exertion but denies any chest pain. She does have a nonproductive cough and no fever.

## 2020-10-16 NOTE — PHYSICAL EXAM
[No Acute Distress] : no acute distress [Normal Appearance] : normal appearance [No Neck Mass] : no neck mass [Normal Rate/Rhythm] : normal rate/rhythm [Normal S1, S2] : normal s1, s2 [Clear to Auscultation Bilaterally] : clear to auscultation bilaterally [No Resp Distress] : no resp distress [No Edema] : no edema [No Clubbing] : no clubbing [Normal Gait] : normal gait [Oriented x3] : oriented x3 [No Focal Deficits] : no focal deficits [TextBox_11] : face mask

## 2020-10-16 NOTE — REVIEW OF SYSTEMS
[Dyspnea] : dyspnea [Fatigue] : fatigue [Cough] : cough [Negative] : Cardiovascular [TextBox_83] : as in history of present illness

## 2020-10-19 LAB
ALBUMIN SERPL ELPH-MCNC: 4.2 G/DL
ALP BLD-CCNC: 99 U/L
ALT SERPL-CCNC: 35 U/L
ANION GAP SERPL CALC-SCNC: 12 MMOL/L
APPEARANCE: CLEAR
AST SERPL-CCNC: 24 U/L
BASOPHILS # BLD AUTO: 0.05 K/UL
BASOPHILS NFR BLD AUTO: 0.8 %
BILIRUB SERPL-MCNC: 0.3 MG/DL
BILIRUBIN URINE: NEGATIVE
BLOOD URINE: NEGATIVE
BUN SERPL-MCNC: 13 MG/DL
CALCIUM SERPL-MCNC: 9.7 MG/DL
CHLORIDE SERPL-SCNC: 104 MMOL/L
CO2 SERPL-SCNC: 28 MMOL/L
COLOR: NORMAL
CREAT SERPL-MCNC: 0.87 MG/DL
EOSINOPHIL # BLD AUTO: 0.27 K/UL
EOSINOPHIL NFR BLD AUTO: 4.3 %
GLUCOSE QUALITATIVE U: NEGATIVE
HCT VFR BLD CALC: 43.3 %
HGB BLD-MCNC: 13 G/DL
IMM GRANULOCYTES NFR BLD AUTO: 0.2 %
KETONES URINE: NEGATIVE
LEUKOCYTE ESTERASE URINE: NEGATIVE
LYMPHOCYTES # BLD AUTO: 2.9 K/UL
LYMPHOCYTES NFR BLD AUTO: 46.6 %
MAN DIFF?: NORMAL
MCHC RBC-ENTMCNC: 24.1 PG
MCHC RBC-ENTMCNC: 30 GM/DL
MCV RBC AUTO: 80.3 FL
MONOCYTES # BLD AUTO: 0.49 K/UL
MONOCYTES NFR BLD AUTO: 7.9 %
NEUTROPHILS # BLD AUTO: 2.5 K/UL
NEUTROPHILS NFR BLD AUTO: 40.2 %
NITRITE URINE: NEGATIVE
PH URINE: 5.5
PLATELET # BLD AUTO: 216 K/UL
POTASSIUM SERPL-SCNC: 4 MMOL/L
PROT SERPL-MCNC: 7.2 G/DL
PROTEIN URINE: NEGATIVE
RBC # BLD: 5.39 M/UL
RBC # FLD: 14.6 %
SODIUM SERPL-SCNC: 144 MMOL/L
SPECIFIC GRAVITY URINE: 1.02
UROBILINOGEN URINE: NORMAL
WBC # FLD AUTO: 6.22 K/UL

## 2020-10-21 ENCOUNTER — NON-APPOINTMENT (OUTPATIENT)
Age: 65
End: 2020-10-21

## 2020-12-03 LAB
ALBUMIN SERPL ELPH-MCNC: 4 G/DL
ALP BLD-CCNC: 78 U/L
ALT SERPL-CCNC: 37 U/L
ANION GAP SERPL CALC-SCNC: 14 MMOL/L
AST SERPL-CCNC: 22 U/L
BASOPHILS # BLD AUTO: 0.12 K/UL
BASOPHILS NFR BLD AUTO: 1.8 %
BILIRUB SERPL-MCNC: <0.2 MG/DL
BUN SERPL-MCNC: 10 MG/DL
CALCIUM SERPL-MCNC: 9.6 MG/DL
CHLORIDE SERPL-SCNC: 106 MMOL/L
CO2 SERPL-SCNC: 22 MMOL/L
CREAT SERPL-MCNC: 0.91 MG/DL
EOSINOPHIL # BLD AUTO: 0.36 K/UL
EOSINOPHIL NFR BLD AUTO: 5.3 %
GLUCOSE SERPL-MCNC: 72 MG/DL
HCT VFR BLD CALC: 43.5 %
HGB BLD-MCNC: 12.9 G/DL
LYMPHOCYTES # BLD AUTO: 3.53 K/UL
LYMPHOCYTES NFR BLD AUTO: 52.6 %
MAN DIFF?: NORMAL
MCHC RBC-ENTMCNC: 23.9 PG
MCHC RBC-ENTMCNC: 29.7 GM/DL
MCV RBC AUTO: 80.6 FL
MONOCYTES # BLD AUTO: 0.64 K/UL
MONOCYTES NFR BLD AUTO: 9.6 %
NEUTROPHILS # BLD AUTO: 1.89 K/UL
NEUTROPHILS NFR BLD AUTO: 28.1 %
PLATELET # BLD AUTO: 213 K/UL
POTASSIUM SERPL-SCNC: 4 MMOL/L
PROT SERPL-MCNC: 6.9 G/DL
RBC # BLD: 5.4 M/UL
RBC # FLD: 15.4 %
SODIUM SERPL-SCNC: 142 MMOL/L
WBC # FLD AUTO: 6.71 K/UL

## 2020-12-23 PROBLEM — Z87.440 HISTORY OF URINARY TRACT INFECTION: Status: RESOLVED | Noted: 2020-10-16 | Resolved: 2020-12-23

## 2021-03-03 ENCOUNTER — APPOINTMENT (OUTPATIENT)
Dept: PULMONOLOGY | Facility: CLINIC | Age: 66
End: 2021-03-03
Payer: COMMERCIAL

## 2021-03-03 VITALS
HEART RATE: 78 BPM | RESPIRATION RATE: 17 BRPM | TEMPERATURE: 98.4 F | BODY MASS INDEX: 31.72 KG/M2 | WEIGHT: 168 LBS | DIASTOLIC BLOOD PRESSURE: 89 MMHG | SYSTOLIC BLOOD PRESSURE: 131 MMHG | HEIGHT: 61 IN | OXYGEN SATURATION: 94 %

## 2021-03-03 PROCEDURE — 99214 OFFICE O/P EST MOD 30 MIN: CPT

## 2021-03-03 PROCEDURE — 99072 ADDL SUPL MATRL&STAF TM PHE: CPT

## 2021-03-03 NOTE — REVIEW OF SYSTEMS
[Fatigue] : fatigue [Cough] : cough [Dyspnea] : dyspnea [SOB on Exertion] : sob on exertion [Negative] : Cardiovascular

## 2021-03-03 NOTE — END OF VISIT
[FreeTextEntry3] : I obtained the history and examined the patient and developed a plan of care  Daniel Abel MD\par

## 2021-03-03 NOTE — HISTORY OF PRESENT ILLNESS
[TextBox_4] : 65-year-old female under our care for cryptogenic organizing pneumonia last seen a number of months ago as we were tapering prednisone. The patient seemed to have stabilized restrictive lung disease associated with significant reduction in diffusing capacity with ambulatory hypoxemia. She had been taking prednisone 5 mg po daily but when she ran out of the medication, she decided to stop taking it all together.  She continues to have a non productive cough and significant shortness of breath.  She will feel short of breath while talking at times; wearing a mask is also challenging with breathing.

## 2021-03-03 NOTE — PHYSICAL EXAM
[No Acute Distress] : no acute distress [Normal Rate/Rhythm] : normal rate/rhythm [Normal S1, S2] : normal s1, s2 [No Murmurs] : no murmurs [No Resp Distress] : no resp distress [No Clubbing] : no clubbing [No Edema] : no edema [No Focal Deficits] : no focal deficits [Oriented x3] : oriented x3 [Normal Affect] : normal affect [Normal Gait] : normal gait [TextBox_68] : crackles  left anterior chest

## 2021-03-03 NOTE — ASSESSMENT
[FreeTextEntry1] : 65-year-old female under our care for cryptogenic organizing pneumonia presents for a follow up.  She will try nasal fluticasone 2 sprays each nostril twice daily and report back to see if this has helped improve the cough.  I walked her 320 feet and her oxygen saturation was dropped from 93 very transiently to 87. I cautioned her about overdoing and to be aware of her limitations. Apparently she has fallen twice and one occasion was dizzy. She bruised her left knee but otherwise was okay.

## 2021-06-23 ENCOUNTER — APPOINTMENT (OUTPATIENT)
Dept: PULMONOLOGY | Facility: CLINIC | Age: 66
End: 2021-06-23
Payer: COMMERCIAL

## 2021-06-23 VITALS
WEIGHT: 175 LBS | HEART RATE: 76 BPM | DIASTOLIC BLOOD PRESSURE: 80 MMHG | SYSTOLIC BLOOD PRESSURE: 114 MMHG | HEIGHT: 61 IN | RESPIRATION RATE: 16 BRPM | TEMPERATURE: 97 F | BODY MASS INDEX: 33.04 KG/M2

## 2021-06-23 PROCEDURE — 99213 OFFICE O/P EST LOW 20 MIN: CPT

## 2021-06-23 PROCEDURE — 99072 ADDL SUPL MATRL&STAF TM PHE: CPT

## 2021-06-23 NOTE — HISTORY OF PRESENT ILLNESS
[TextBox_4] : 66-year-old female under our care for cryptogenic organizing pneumonia last seen a number of months ago as we were tapering prednisone. The patient seemed to have stabilized restrictive lung disease associated with significant reduction in diffusing capacity with ambulatory hypoxemia. SHe has not been on prednisone for almost 1 year. She continues to have a non productive cough and significant shortness of breath.  She will feel short of breath while talking at times; wearing a mask is also challenging with breathing.  She tried nasal fluticasone for the cough but this did not help with the cough.

## 2021-06-23 NOTE — PHYSICAL EXAM
[No Acute Distress] : no acute distress [Normal Rate/Rhythm] : normal rate/rhythm [Normal S1, S2] : normal s1, s2 [No Murmurs] : no murmurs [No Resp Distress] : no resp distress [Clear to Auscultation Bilaterally] : clear to auscultation bilaterally [No Focal Deficits] : no focal deficits [Oriented x3] : oriented x3 [Normal Affect] : normal affect

## 2021-06-23 NOTE — ASSESSMENT
[FreeTextEntry1] : 66-year-old female under our care for cryptogenic organizing pneumonia last seen a number of months ago as we were tapering prednisone. Dyspnea is less of an issue than a cough. When I walked her today, after 320 feet her oxygen saturations fell only to 92% which is much better than previously.Will try azelastine 2 sprays each nostril twice daily for the cough. I asked her to call me in 4 weeks to see if that worked. I would also consider an Atrovent nasal spray

## 2022-02-23 NOTE — ED ADULT TRIAGE NOTE - NS ED NURSE BANDS TYPE
Name band;
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 Allegedly, she recently bit herself and broke her iPad.    On exam, Mercedes presented with odd affect and limited engagement, all consistent with existing intellectual disability. She was calm the entire time she was in the ED, did not require IM medication or physical restraints. Mercedes is known to be impulsive and dysregulated at baseline, owing to her intellectual disability. Though she was allegedly violent today, there is no clear change in her status or demeanor that would indicate this is anything other than baseline behavior. Given that she lives in a structured mental health setting currently, there is little that an inpatient psychiatric hospitalization could do to alter her chronic symptoms. She will be discharged home to her residence.

## 2022-05-19 ENCOUNTER — NON-APPOINTMENT (OUTPATIENT)
Age: 67
End: 2022-05-19

## 2022-05-19 DIAGNOSIS — S29.9XXA UNSPECIFIED INJURY OF THORAX, INITIAL ENCOUNTER: ICD-10-CM

## 2022-05-19 DIAGNOSIS — Z01.812 ENCOUNTER FOR PREPROCEDURAL LABORATORY EXAMINATION: ICD-10-CM

## 2022-05-20 ENCOUNTER — APPOINTMENT (OUTPATIENT)
Dept: PULMONOLOGY | Facility: CLINIC | Age: 67
End: 2022-05-20
Payer: COMMERCIAL

## 2022-05-20 VITALS
OXYGEN SATURATION: 91 % | BODY MASS INDEX: 32.85 KG/M2 | HEIGHT: 61 IN | WEIGHT: 174 LBS | HEART RATE: 95 BPM | DIASTOLIC BLOOD PRESSURE: 82 MMHG | TEMPERATURE: 97 F | RESPIRATION RATE: 15 BRPM | SYSTOLIC BLOOD PRESSURE: 132 MMHG

## 2022-05-20 PROCEDURE — 99213 OFFICE O/P EST LOW 20 MIN: CPT

## 2022-05-20 NOTE — HISTORY OF PRESENT ILLNESS
[TextBox_4] : 67-year-old female [address] to ventilatory impairment, likely cryptogenic organizing pneumonia that had been stable for many years.(biopsy-proven)  2 years ago she developed increasing dyspnea, cough and wheezing and required corticosteroids for a time. This is followed by a persistent cough that resolved with treatment with fluticasone and azelastin. She has felt well for almost a year , only complaining of dyspnea on exertion until several weeks ago when the cough returned. There have been no specific triggers. It turns during the day and night and has been associated with dyspnea but no fever.

## 2022-05-20 NOTE — ASSESSMENT
[FreeTextEntry1] : cough likely emanating from both upper and lower airways. We'll treat as in the past with nasal sprays and a brief course of corticosteroids. I asked her to call me in several weeks to see if this regimen works. She knows to call me if she gets worse.

## 2022-05-20 NOTE — PHYSICAL EXAM
[No Acute Distress] : no acute distress [Normal Appearance] : normal appearance [Normal Rate/Rhythm] : normal rate/rhythm [Normal S1, S2] : normal s1, s2 [No Resp Distress] : no resp distress [Clear to Auscultation Bilaterally] : clear to auscultation bilaterally [No Focal Deficits] : no focal deficits [TextBox_11] : zachary handley [TextBox_68] : diminished excursion

## 2022-06-10 ENCOUNTER — APPOINTMENT (OUTPATIENT)
Dept: PULMONOLOGY | Facility: CLINIC | Age: 67
End: 2022-06-10
Payer: COMMERCIAL

## 2022-06-10 VITALS
HEART RATE: 84 BPM | TEMPERATURE: 97.7 F | WEIGHT: 174 LBS | RESPIRATION RATE: 16 BRPM | SYSTOLIC BLOOD PRESSURE: 112 MMHG | BODY MASS INDEX: 32.85 KG/M2 | DIASTOLIC BLOOD PRESSURE: 67 MMHG | HEIGHT: 61 IN

## 2022-06-10 DIAGNOSIS — R05.9 COUGH, UNSPECIFIED: ICD-10-CM

## 2022-06-10 PROCEDURE — 99213 OFFICE O/P EST LOW 20 MIN: CPT

## 2022-06-10 RX ORDER — PREDNISONE 10 MG/1
10 TABLET ORAL DAILY
Qty: 10 | Refills: 0 | Status: ACTIVE | COMMUNITY
Start: 2022-05-23 | End: 1900-01-01

## 2022-06-10 NOTE — ASSESSMENT
[FreeTextEntry1] : 67F with  and upper airway cough syndrome. Persistent dry cough improves with prednisone. We will continue Prednisone 10mg for a few more days. Then start nasal corticosteroid and azelastin for post nasal drip. If cough/throat don't improve she will call us.

## 2022-06-10 NOTE — PHYSICAL EXAM
[No Acute Distress] : no acute distress [Well Nourished] : well nourished [Well Developed] : well developed [Normal Rate/Rhythm] : normal rate/rhythm [No Resp Distress] : no resp distress [Clear to Auscultation Bilaterally] : clear to auscultation bilaterally [Normal Gait] : normal gait [No Clubbing] : no clubbing [No Edema] : no edema [Oriented x3] : oriented x3 [Normal Affect] : normal affect [TextBox_68] : inspiration triggers cough

## 2022-06-10 NOTE — HISTORY OF PRESENT ILLNESS
[TextBox_4] : 67-year-old female with obstructive ventilatory impairment, likely cryptogenic organizing pneumonia (biopsy-proven) that had been stable for many years. 2 years ago she developed increasing dyspnea, cough and wheezing and required corticosteroids for a time. This is followed by a persistent cough that resolved with treatment with fluticasone and azelastin. She was seen for follow up in May 2022, and states she felt well for almost a year , only complaining of dyspnea on exertion until several weeks ago when the cough returned. There have been no specific triggers.\par \par She was started on a brief course of prednisone which she completed. Cough resolved while she was on the steroids. Cough has now returned and is persistent, dry. She has not received the nasal sprays due to insurance issue. +throat clearing.

## 2022-06-16 ENCOUNTER — NON-APPOINTMENT (OUTPATIENT)
Age: 67
End: 2022-06-16

## 2022-07-19 NOTE — REVIEW OF SYSTEMS
Received. Please see tel encounter from 7/5. Patient declined. [Cough] : cough [Dyspnea] : dyspnea [Negative] : Allergy/Immunology [Heartburn] : heartburn [Sputum] : not coughing up ~M sputum [Hemoptysis] : no hemoptysis [Chest Tightness] : no chest tightness [Pleuritic Pain] : no pleuritic pain [Wheezing] : no wheezing [FreeTextEntry6] : sciatic pain

## 2023-03-09 ENCOUNTER — APPOINTMENT (OUTPATIENT)
Dept: PULMONOLOGY | Facility: CLINIC | Age: 68
End: 2023-03-09

## 2023-03-14 ENCOUNTER — APPOINTMENT (OUTPATIENT)
Dept: PULMONOLOGY | Facility: CLINIC | Age: 68
End: 2023-03-14
Payer: MEDICARE

## 2023-03-14 VITALS
RESPIRATION RATE: 15 BRPM | SYSTOLIC BLOOD PRESSURE: 114 MMHG | TEMPERATURE: 97.5 F | HEART RATE: 76 BPM | WEIGHT: 167.5 LBS | DIASTOLIC BLOOD PRESSURE: 73 MMHG | HEIGHT: 61 IN | OXYGEN SATURATION: 97 % | BODY MASS INDEX: 31.63 KG/M2

## 2023-03-14 PROCEDURE — 99214 OFFICE O/P EST MOD 30 MIN: CPT

## 2023-03-14 RX ORDER — ALBUTEROL SULFATE 90 UG/1
108 (90 BASE) INHALANT RESPIRATORY (INHALATION) EVERY 4 HOURS
Qty: 1 | Refills: 3 | Status: ACTIVE | COMMUNITY
Start: 2019-11-05 | End: 1900-01-01

## 2023-03-14 NOTE — END OF VISIT
[FreeTextEntry3] : I spent a total of 30 minutes with this patient contact including face-to-face time and documentation [Time Spent: ___ minutes] : I have spent [unfilled] minutes of time on the encounter.

## 2023-03-14 NOTE — PHYSICAL EXAM
[No Acute Distress] : no acute distress [Normal Appearance] : normal appearance [Normal Rate/Rhythm] : normal rate/rhythm [Normal S1, S2] : normal s1, s2 [No Resp Distress] : no resp distress [Clear to Auscultation Bilaterally] : clear to auscultation bilaterally [Normal Gait] : normal gait [No Clubbing] : no clubbing [No Edema] : no edema [Oriented x3] : oriented x3

## 2023-03-14 NOTE — REVIEW OF SYSTEMS
[SOB on Exertion] : sob on exertion [GERD] : gerd [Arthralgias] : arthralgias [Back Pain] : back pain [Negative] : Endocrine

## 2023-03-14 NOTE — HISTORY OF PRESENT ILLNESS
[TextBox_4] : 67-year-old female with biopsy-proven cryptogenic organizing pneumonitis With stable pulmonary functions over the last few years she has been treated with systemic corticosteroids but recently inhaled bronchodilators. Lung functions demonstrated a significant restrictive ventilatory impairment with severe diffusion abnormality. She had planned to have back surgery but a chest is abnormal and she was treated with antibiotics. The surgery was postponed  The patient reports that her breathing seems stable.. She denies any significant coughing, wheezing or chest discomfort

## 2023-03-14 NOTE — ASSESSMENT
[FreeTextEntry1] : After walking 200 feet, her oxygen saturation fell from  97-93%. I reviewed her chest x-ray report and it sounds quite similar to findings on her last CAT scan. I asked her to bring a copy of the x-ray to me and to have a followup lung functions prior to surgery.\par \par I will forward this information to her primary care physician

## 2023-04-06 ENCOUNTER — APPOINTMENT (OUTPATIENT)
Dept: PULMONOLOGY | Facility: CLINIC | Age: 68
End: 2023-04-06
Payer: SELF-PAY

## 2023-04-06 PROCEDURE — 94729 DIFFUSING CAPACITY: CPT

## 2023-04-06 PROCEDURE — 94726 PLETHYSMOGRAPHY LUNG VOLUMES: CPT

## 2023-04-06 PROCEDURE — 94010 BREATHING CAPACITY TEST: CPT

## 2023-04-11 ENCOUNTER — APPOINTMENT (OUTPATIENT)
Dept: PULMONOLOGY | Facility: CLINIC | Age: 68
End: 2023-04-11
Payer: MEDICARE

## 2023-04-11 VITALS
HEIGHT: 61 IN | WEIGHT: 166 LBS | OXYGEN SATURATION: 98 % | RESPIRATION RATE: 15 BRPM | BODY MASS INDEX: 31.34 KG/M2 | HEART RATE: 72 BPM | TEMPERATURE: 97.4 F | SYSTOLIC BLOOD PRESSURE: 118 MMHG | DIASTOLIC BLOOD PRESSURE: 75 MMHG

## 2023-04-11 PROCEDURE — 99214 OFFICE O/P EST MOD 30 MIN: CPT

## 2023-04-11 NOTE — PHYSICAL EXAM
[No Acute Distress] : no acute distress [Well Nourished] : well nourished [Well Developed] : well developed [Normal Rate/Rhythm] : normal rate/rhythm [Normal S1, S2] : normal s1, s2 [No Resp Distress] : no resp distress [Clear to Auscultation Bilaterally] : clear to auscultation bilaterally [No Edema] : no edema

## 2023-04-11 NOTE — HISTORY OF PRESENT ILLNESS
[TextBox_4] : 67-year-old female with biopsy-proven cryptogenic organizing pneumonitis With stable pulmonary functions over the last few years she has been treated with systemic corticosteroids but recently inhaled bronchodilators. Lung functions demonstrated a significant restrictive ventilatory impairment with severe diffusion abnormality. She had planned to have back surgery but a chest is abnormal and she was treated with antibiotics. The surgery was postponed The patient reports that her breathing seems stable.. She denies any significant coughing, wheezing or chest discomfort. \par \par Back surgery scheduled in May.  Last CXR in February, uploaded. PFTs show restrictive ventilatory defect.\par She has chronic sob on exertion, chronic dry cough and post nasal drip, worse at night. flonase and azelastine help. \par feels well. \par \par

## 2023-04-11 NOTE — ASSESSMENT
[FreeTextEntry1] : The patient has fibrotic changes related to cryptogenic organizing pneumonitis diagnosed in 2010 the lung biopsy. The patient experienced initially improvement with steroids and several years ago seemed to reach maximum benefit. Her lung functions remained reasonably stable With moderately severe restrictive ventilatory impairment and a significant reduction in diffusing capacity comparing her gas exchange.\par She is scheduled to have back surgery because of low back pain and radiculopathy.
- - -

## 2023-04-11 NOTE — END OF VISIT
[FreeTextEntry3] : I spent a total of 30 minutes on this patient contact including face-to-face time, her review of outpatient imaging, a review of previous pulmonary function data and documentation

## 2023-05-23 ENCOUNTER — NON-APPOINTMENT (OUTPATIENT)
Age: 68
End: 2023-05-23

## 2023-11-03 ENCOUNTER — APPOINTMENT (OUTPATIENT)
Dept: PULMONOLOGY | Facility: CLINIC | Age: 68
End: 2023-11-03
Payer: MEDICARE

## 2023-11-03 VITALS
BODY MASS INDEX: 30.99 KG/M2 | WEIGHT: 164.13 LBS | RESPIRATION RATE: 16 BRPM | DIASTOLIC BLOOD PRESSURE: 77 MMHG | TEMPERATURE: 98.4 F | OXYGEN SATURATION: 95 % | HEIGHT: 61 IN | SYSTOLIC BLOOD PRESSURE: 144 MMHG | HEART RATE: 89 BPM

## 2023-11-03 DIAGNOSIS — J84.116 CRYPTOGENIC ORGANIZING PNEUMONIA: ICD-10-CM

## 2023-11-03 DIAGNOSIS — R09.02 HYPOXEMIA: ICD-10-CM

## 2023-11-03 PROCEDURE — 99213 OFFICE O/P EST LOW 20 MIN: CPT

## 2023-11-03 RX ORDER — FLUTICASONE PROPIONATE 50 UG/1
50 SPRAY, METERED NASAL TWICE DAILY
Qty: 1 | Refills: 2 | Status: DISCONTINUED | COMMUNITY
Start: 2019-11-05 | End: 2023-11-03

## 2023-11-03 RX ORDER — FLUTICASONE PROPIONATE 50 UG/1
50 SPRAY, METERED NASAL TWICE DAILY
Qty: 1 | Refills: 2 | Status: DISCONTINUED | COMMUNITY
Start: 2019-08-14 | End: 2023-11-03

## 2023-11-03 RX ORDER — AZELASTINE HYDROCHLORIDE AND FLUTICASONE PROPIONATE 137; 50 UG/1; UG/1
137-50 SPRAY, METERED NASAL
Qty: 1 | Refills: 3 | Status: ACTIVE | COMMUNITY
Start: 2023-11-03 | End: 1900-01-01

## 2023-11-03 RX ORDER — AZELASTINE HYDROCHLORIDE 137 UG/1
137 SPRAY, METERED NASAL
Qty: 1 | Refills: 2 | Status: DISCONTINUED | COMMUNITY
Start: 2021-06-23 | End: 2023-11-03

## 2023-11-03 RX ORDER — LEVALBUTEROL TARTRATE 45 UG/1
45 AEROSOL, METERED ORAL
Qty: 1 | Refills: 11 | Status: ACTIVE | COMMUNITY
Start: 2023-03-17 | End: 1900-01-01

## 2024-06-03 NOTE — ED ADULT NURSE NOTE - NSFALLRSKASSESSDT_ED_ALL_ED
[facility] URGENT CARE  Urgent Care Encounter      CHIEF COMPLAINT       Chief Complaint   Patient presents with    Headache     \"Sinus\"     Sinusitis     Congestion/Drainage, Pressure, Post nasal drip        Nurses Notes reviewed and I agree except as noted in the HPI.  HISTORY OF PRESENT ILLNESS   Lavonne Luis is a 46 y.o. female who presents presents urgent care for evaluation of headache and sinus pressure, pain and drainage.  Reports onset of symptoms about 1 week ago after being around her coworker.  Denies any fevers.  Significant amount of congestion postnasal drip, runny nose sinus pressure and pain.  And reports sinus headache.  She reports she typically gets a sinus infection every 3 to 4 months.  Reports she takes daily allergy medication.  Does admit she did not do her Flonase.    REVIEW OF SYSTEMS     Review of Systems   Constitutional:  Negative for chills, diaphoresis, fatigue and fever.   HENT:  Positive for congestion, postnasal drip, rhinorrhea, sinus pressure and sinus pain. Negative for ear pain, sore throat and trouble swallowing.    Eyes:  Negative for discharge and redness.   Respiratory:  Negative for cough and shortness of breath.    Cardiovascular:  Negative for chest pain.   Gastrointestinal:  Negative for diarrhea, nausea and vomiting.   Genitourinary:  Negative for decreased urine volume.   Musculoskeletal:  Negative for neck pain and neck stiffness.   Skin:  Negative for rash.   Neurological:  Positive for headaches.   Hematological:  Negative for adenopathy.   Psychiatric/Behavioral:  Negative for sleep disturbance.        PAST MEDICAL HISTORY         Diagnosis Date    Alcohol abuse 6/18/2021    Anxiety     Hereditary and idiopathic peripheral neuropathy 6/15/2021    Neuropathy     feet and legs at night        SURGICAL HISTORY     Patient  has a past surgical history that includes ovarian cyst drainage.    CURRENT MEDICATIONS       Previous Medications    FLUTICASONE  erythema present.      Tonsils: No tonsillar abscesses.   Eyes:      Conjunctiva/sclera:      Right eye: Right conjunctiva is not injected. No hemorrhage.     Left eye: Left conjunctiva is not injected. No hemorrhage.     Pupils: Pupils are equal, round, and reactive to light.   Neck:      Thyroid: No thyromegaly.      Trachea: Trachea normal.   Cardiovascular:      Rate and Rhythm: Normal rate and regular rhythm. No extrasystoles are present.     Chest Wall: PMI is not displaced.      Heart sounds: Normal heart sounds. No murmur heard.     No friction rub. No gallop.   Pulmonary:      Effort: Pulmonary effort is normal. No respiratory distress.      Breath sounds: Normal breath sounds.   Lymphadenopathy:      Head:      Right side of head: No submental, submandibular, tonsillar or occipital adenopathy.      Left side of head: No submental, submandibular, tonsillar or occipital adenopathy.      Upper Body:      Right upper body: No supraclavicular adenopathy.      Left upper body: No supraclavicular adenopathy.   Skin:     General: Skin is warm and dry.      Capillary Refill: Capillary refill takes less than 2 seconds.      Coloration: Skin is not pale.      Findings: No rash.   Neurological:      General: No focal deficit present.      Mental Status: She is alert and oriented to person, place, and time. She is not disoriented.   Psychiatric:         Mood and Affect: Mood normal.         Behavior: Behavior is cooperative.         DIAGNOSTIC RESULTS   Labs:No results found for this visit on 06/03/24.    IMAGING:  No orders to display      URGENT CARE COURSE:     Vitals:    06/03/24 1759   BP: 111/79   Pulse: 88   Resp: 16   Temp: 98.1 °F (36.7 °C)   TempSrc: Temporal   SpO2: 96%   Weight: 61.2 kg (135 lb)   Height: 1.676 m (5' 6\")       Medications - No data to display  PROCEDURES:  None  FINAL IMPRESSION      1. Acute non-recurrent pansinusitis    2. Acute non-recurrent maxillary sinusitis        DISPOSITION/PLAN  14-Jan-2020 17:50

## 2025-01-16 ENCOUNTER — APPOINTMENT (OUTPATIENT)
Dept: PULMONOLOGY | Facility: CLINIC | Age: 70
End: 2025-01-16
Payer: MEDICARE

## 2025-01-16 VITALS
WEIGHT: 168 LBS | HEART RATE: 68 BPM | OXYGEN SATURATION: 95 % | DIASTOLIC BLOOD PRESSURE: 63 MMHG | HEIGHT: 61 IN | BODY MASS INDEX: 31.72 KG/M2 | TEMPERATURE: 97.3 F | SYSTOLIC BLOOD PRESSURE: 121 MMHG

## 2025-01-16 DIAGNOSIS — K21.9 GASTRO-ESOPHAGEAL REFLUX DISEASE W/OUT ESOPHAGITIS: ICD-10-CM

## 2025-01-16 DIAGNOSIS — R05.9 COUGH, UNSPECIFIED: ICD-10-CM

## 2025-01-16 DIAGNOSIS — J84.116 CRYPTOGENIC ORGANIZING PNEUMONIA: ICD-10-CM

## 2025-01-16 PROCEDURE — 99214 OFFICE O/P EST MOD 30 MIN: CPT

## 2025-01-16 RX ORDER — PREDNISONE 20 MG/1
0 TABLET ORAL
Qty: 0 | Refills: 0 | DISCHARGE
Start: 2025-01-16

## 2025-01-16 RX ORDER — AZELASTINE HYDROCHLORIDE 137 UG/1
0 SPRAY, METERED NASAL
Qty: 0 | Refills: 3 | DISCHARGE
Start: 2025-01-16

## 2025-01-16 RX ORDER — PREDNISONE 10 MG/1
10 TABLET ORAL
Qty: 50 | Refills: 0 | Status: ACTIVE | COMMUNITY
Start: 2025-01-16 | End: 1900-01-01

## 2025-01-16 RX ORDER — FLUTICASONE PROPIONATE 50 UG/1
2 SPRAY, METERED NASAL
Qty: 0 | Refills: 3 | DISCHARGE
Start: 2025-01-16

## 2025-01-16 RX ORDER — OMEPRAZOLE 20 MG/1
1 CAPSULE, DELAYED RELEASE ORAL
Refills: 0 | DISCHARGE
Start: 2025-01-16

## 2025-01-16 RX ORDER — FLUTICASONE PROPIONATE 50 UG/1
50 SPRAY, METERED NASAL TWICE DAILY
Qty: 1 | Refills: 3 | Status: ACTIVE | COMMUNITY
Start: 2025-01-16 | End: 1900-01-01

## 2025-01-16 RX ORDER — AZELASTINE HYDROCHLORIDE 137 UG/1
0.1 SPRAY, METERED NASAL
Qty: 1 | Refills: 3 | Status: ACTIVE | COMMUNITY
Start: 2025-01-16 | End: 1900-01-01

## 2025-01-16 RX ORDER — OMEPRAZOLE 20 MG/1
20 CAPSULE, DELAYED RELEASE ORAL DAILY
Qty: 1 | Refills: 11 | Status: ACTIVE | COMMUNITY
Start: 2025-01-16 | End: 1900-01-01

## 2025-02-25 ENCOUNTER — RX RENEWAL (OUTPATIENT)
Age: 70
End: 2025-02-25

## 2025-02-27 ENCOUNTER — EMERGENCY (EMERGENCY)
Facility: HOSPITAL | Age: 70
LOS: 0 days | Discharge: HOME | End: 2025-02-27
Attending: STUDENT IN AN ORGANIZED HEALTH CARE EDUCATION/TRAINING PROGRAM
Payer: MEDICARE

## 2025-02-27 VITALS
SYSTOLIC BLOOD PRESSURE: 129 MMHG | OXYGEN SATURATION: 99 % | HEART RATE: 72 BPM | TEMPERATURE: 98 F | DIASTOLIC BLOOD PRESSURE: 62 MMHG | RESPIRATION RATE: 18 BRPM

## 2025-02-27 VITALS
DIASTOLIC BLOOD PRESSURE: 76 MMHG | HEART RATE: 68 BPM | RESPIRATION RATE: 19 BRPM | TEMPERATURE: 98 F | WEIGHT: 173.94 LBS | OXYGEN SATURATION: 95 % | HEIGHT: 62 IN | SYSTOLIC BLOOD PRESSURE: 114 MMHG

## 2025-02-27 DIAGNOSIS — J84.116 CRYPTOGENIC ORGANIZING PNEUMONIA: ICD-10-CM

## 2025-02-27 DIAGNOSIS — R06.02 SHORTNESS OF BREATH: ICD-10-CM

## 2025-02-27 DIAGNOSIS — J18.9 PNEUMONIA, UNSPECIFIED ORGANISM: ICD-10-CM

## 2025-02-27 DIAGNOSIS — R05.9 COUGH, UNSPECIFIED: ICD-10-CM

## 2025-02-27 LAB
ALBUMIN SERPL ELPH-MCNC: 3.3 G/DL — SIGNIFICANT CHANGE UP (ref 3.3–5)
ALP SERPL-CCNC: 72 U/L — SIGNIFICANT CHANGE UP (ref 40–120)
ALT FLD-CCNC: 25 U/L — SIGNIFICANT CHANGE UP (ref 12–78)
ANION GAP SERPL CALC-SCNC: 4 MMOL/L — LOW (ref 5–17)
AST SERPL-CCNC: 14 U/L — LOW (ref 15–37)
BASOPHILS # BLD AUTO: 0.07 K/UL — SIGNIFICANT CHANGE UP (ref 0–0.2)
BASOPHILS NFR BLD AUTO: 0.5 % — SIGNIFICANT CHANGE UP (ref 0–2)
BILIRUB SERPL-MCNC: 0.5 MG/DL — SIGNIFICANT CHANGE UP (ref 0.2–1.2)
BUN SERPL-MCNC: 8 MG/DL — SIGNIFICANT CHANGE UP (ref 7–23)
CALCIUM SERPL-MCNC: 9.6 MG/DL — SIGNIFICANT CHANGE UP (ref 8.5–10.1)
CHLORIDE SERPL-SCNC: 106 MMOL/L — SIGNIFICANT CHANGE UP (ref 96–108)
CO2 SERPL-SCNC: 28 MMOL/L — SIGNIFICANT CHANGE UP (ref 22–31)
CREAT SERPL-MCNC: 0.87 MG/DL — SIGNIFICANT CHANGE UP (ref 0.5–1.3)
EGFR: 73 ML/MIN/1.73M2 — SIGNIFICANT CHANGE UP
EOSINOPHIL # BLD AUTO: 0.47 K/UL — SIGNIFICANT CHANGE UP (ref 0–0.5)
EOSINOPHIL NFR BLD AUTO: 3.6 % — SIGNIFICANT CHANGE UP (ref 0–6)
FLUAV AG NPH QL: SIGNIFICANT CHANGE UP
FLUBV AG NPH QL: SIGNIFICANT CHANGE UP
GLUCOSE SERPL-MCNC: 77 MG/DL — SIGNIFICANT CHANGE UP (ref 70–99)
HCT VFR BLD CALC: 44.1 % — SIGNIFICANT CHANGE UP (ref 34.5–45)
HGB BLD-MCNC: 13.4 G/DL — SIGNIFICANT CHANGE UP (ref 11.5–15.5)
IMM GRANULOCYTES NFR BLD AUTO: 0.3 % — SIGNIFICANT CHANGE UP (ref 0–0.9)
LYMPHOCYTES # BLD AUTO: 37.3 % — SIGNIFICANT CHANGE UP (ref 13–44)
LYMPHOCYTES # BLD AUTO: 4.85 K/UL — HIGH (ref 1–3.3)
MCHC RBC-ENTMCNC: 23.8 PG — LOW (ref 27–34)
MCHC RBC-ENTMCNC: 30.4 G/DL — LOW (ref 32–36)
MCV RBC AUTO: 78.2 FL — LOW (ref 80–100)
MONOCYTES # BLD AUTO: 0.89 K/UL — SIGNIFICANT CHANGE UP (ref 0–0.9)
MONOCYTES NFR BLD AUTO: 6.9 % — SIGNIFICANT CHANGE UP (ref 2–14)
NEUTROPHILS # BLD AUTO: 6.67 K/UL — SIGNIFICANT CHANGE UP (ref 1.8–7.4)
NEUTROPHILS NFR BLD AUTO: 51.4 % — SIGNIFICANT CHANGE UP (ref 43–77)
NRBC BLD AUTO-RTO: 0 /100 WBCS — SIGNIFICANT CHANGE UP (ref 0–0)
PLATELET # BLD AUTO: 222 K/UL — SIGNIFICANT CHANGE UP (ref 150–400)
POTASSIUM SERPL-MCNC: 3.6 MMOL/L — SIGNIFICANT CHANGE UP (ref 3.5–5.3)
POTASSIUM SERPL-SCNC: 3.6 MMOL/L — SIGNIFICANT CHANGE UP (ref 3.5–5.3)
PROT SERPL-MCNC: 7.6 GM/DL — SIGNIFICANT CHANGE UP (ref 6–8.3)
RBC # BLD: 5.64 M/UL — HIGH (ref 3.8–5.2)
RBC # FLD: 15.4 % — HIGH (ref 10.3–14.5)
RSV RNA NPH QL NAA+NON-PROBE: SIGNIFICANT CHANGE UP
SARS-COV-2 RNA SPEC QL NAA+PROBE: SIGNIFICANT CHANGE UP
SODIUM SERPL-SCNC: 138 MMOL/L — SIGNIFICANT CHANGE UP (ref 135–145)
WBC # BLD: 12.99 K/UL — HIGH (ref 3.8–10.5)
WBC # FLD AUTO: 12.99 K/UL — HIGH (ref 3.8–10.5)

## 2025-02-27 PROCEDURE — 71045 X-RAY EXAM CHEST 1 VIEW: CPT | Mod: 26

## 2025-02-27 PROCEDURE — 99284 EMERGENCY DEPT VISIT MOD MDM: CPT

## 2025-02-27 RX ORDER — CEFPODOXIME PROXETIL 200 MG
200 TABLET ORAL ONCE
Refills: 0 | Status: COMPLETED | OUTPATIENT
Start: 2025-02-27 | End: 2025-02-27

## 2025-02-27 RX ORDER — AZITHROMYCIN DIHYDRATE 500 MG/1
1 TABLET, FILM COATED ORAL
Qty: 4 | Refills: 0
Start: 2025-02-27 | End: 2025-03-02

## 2025-02-27 RX ORDER — CEFUROXIME AXETIL 500 MG
1 TABLET ORAL
Qty: 10 | Refills: 0
Start: 2025-02-27 | End: 2025-03-03

## 2025-02-27 RX ORDER — AZITHROMYCIN DIHYDRATE 500 MG/1
500 TABLET, FILM COATED ORAL ONCE
Refills: 0 | Status: COMPLETED | OUTPATIENT
Start: 2025-02-27 | End: 2025-02-27

## 2025-02-27 RX ADMIN — Medication 200 MILLIGRAM(S): at 16:42

## 2025-02-27 RX ADMIN — AZITHROMYCIN DIHYDRATE 500 MILLIGRAM(S): 500 TABLET, FILM COATED ORAL at 16:42

## 2025-02-27 NOTE — ED ADULT NURSE NOTE - OBJECTIVE STATEMENT
Covering primary RN Arti. 69 y.o female, A&ox4, c.o flu like symptoms. As per pt, she has been having intermittent cough, difficulty breathing, headache and weakness x 2 days. pt states she was exposed to flu at work. pt speaks in full and complete sentences without difficulty. no acute distress noted. respirations even and unlabored. Covering primary RN Arti. 69 y.o female, A&Ox4, c.o flu like symptoms. As per pt, she has been having intermittent cough, difficulty breathing, headache and weakness x 2 days. pt states she was exposed to flu at work. pt states she went to urgent care and they took an x-ray and pt states "they said the left lung is a little hazy". pt states she was told to come to ED for further evaluation due to previous lung history.  pt speaks in full and complete sentences without difficulty. no acute distress noted. respirations even and unlabored.

## 2025-02-27 NOTE — ED PROVIDER NOTE - PATIENT PORTAL LINK FT
You can access the FollowMyHealth Patient Portal offered by [facility] by registering at the following website: [email or web address]. By joining [employer]’s FollowMyHealth portal, you will also be able to view your health information using other applications (apps) compatible with our system.

## 2025-02-27 NOTE — ED ADULT NURSE NOTE - NSICDXPASTMEDICALHX_GEN_ALL_CORE_FT
PAST MEDICAL HISTORY:  Cryptogenic Organizing Pneumonia     Iron Deficiency Anemia     PNA (pneumonia)

## 2025-02-27 NOTE — ED PROVIDER NOTE - CLINICAL SUMMARY MEDICAL DECISION MAKING FREE TEXT BOX
70yo female with pmh cryptogenic organizing pneumonia, lynn, presenting with cough, shortness of breath.  Has chronic issues with this and has been taking prednisone prescribed by her pulmonologist but worsened over past few days.  No ha, fevers, cp, abd pain, n/v/d, urinary symptoms.  Feels like cough and sob worsening.  Will get screening labs, XR, reassess.

## 2025-02-27 NOTE — ED ADULT TRIAGE NOTE - CHIEF COMPLAINT QUOTE
BIBA for intermittent cough, difficulty breathing, headache and weakness started 2 days ago. Exposed to flu at work. Speaks in full sentences. Breathing equal and nonlabored.

## 2025-02-27 NOTE — ED ADULT NURSE NOTE - RESPIRATION RHYTHM, QM
[facility] - Telemetry [facility] Medicine  Progress Note    Patient Name: Umang Arnold  MRN: [id number]  Patient Class: IP- Inpatient   Admission Date: 8/14/2024  Length of Stay: 26 days  Attending Physician: Rajesh Boyd DO  Primary Care Provider: Emily Salomon [facility] [facility] - Telemetry Stepdown

## 2025-02-27 NOTE — ED PROVIDER NOTE - PHYSICAL EXAMINATION
General appearance: Nontoxic appearing, conversant, afebrile    Eyes: anicteric sclerae, HUY, EOMI   HENT: Atraumatic; oropharynx clear, MMM and no ulcerations, no pharyngeal erythema or exudate   Neck: Trachea midline; Full range of motion, supple   Pulm: CTA bl, normal respiratory effort and no intercostal retractions, normal work of breathing   CV: RRR, No murmurs, rubs, or gallops   Abdomen: Soft, non-tender, non-distended; no guarding or rebound   Extremities: No peripheral edema, no gross deformities, FROM x4   Skin: Dry, normal temperature, turgor and texture; no rash   Psych: Appropriate affect, cooperative

## 2025-03-26 ENCOUNTER — APPOINTMENT (OUTPATIENT)
Dept: PULMONOLOGY | Facility: CLINIC | Age: 70
End: 2025-03-26
Payer: MEDICARE

## 2025-03-26 VITALS
DIASTOLIC BLOOD PRESSURE: 75 MMHG | HEIGHT: 61 IN | HEART RATE: 71 BPM | RESPIRATION RATE: 15 BRPM | TEMPERATURE: 98.1 F | OXYGEN SATURATION: 91 % | SYSTOLIC BLOOD PRESSURE: 111 MMHG | WEIGHT: 172.13 LBS | BODY MASS INDEX: 32.5 KG/M2

## 2025-03-26 DIAGNOSIS — R05.9 COUGH, UNSPECIFIED: ICD-10-CM

## 2025-03-26 DIAGNOSIS — J84.116 CRYPTOGENIC ORGANIZING PNEUMONIA: ICD-10-CM

## 2025-03-26 PROCEDURE — 99214 OFFICE O/P EST MOD 30 MIN: CPT

## 2025-03-26 RX ORDER — HYDROCODONE/HOMATROPINE
0 SYRUP ORAL
Qty: 0 | Refills: 0 | DISCHARGE
Start: 2025-03-26

## 2025-03-26 RX ORDER — PREDNISONE 10 MG/1
10 TABLET ORAL
Qty: 60 | Refills: 0 | Status: ACTIVE | COMMUNITY
Start: 2025-03-26 | End: 1900-01-01

## 2025-03-26 RX ORDER — PREDNISONE 20 MG/1
1 TABLET ORAL
Refills: 0 | DISCHARGE
Start: 2025-03-26

## 2025-03-26 RX ORDER — HYDROCODONE BITARTRATE AND HOMATROPINE METHYLBROMIDE 1.5; 5 MG/5ML; MG/5ML
5-1.5 SOLUTION ORAL
Qty: 240 | Refills: 0 | Status: ACTIVE | COMMUNITY
Start: 2025-03-26 | End: 1900-01-01

## 2025-04-04 ENCOUNTER — APPOINTMENT (OUTPATIENT)
Dept: CT IMAGING | Facility: IMAGING CENTER | Age: 70
End: 2025-04-04

## 2025-04-05 ENCOUNTER — OUTPATIENT (OUTPATIENT)
Dept: OUTPATIENT SERVICES | Facility: HOSPITAL | Age: 70
LOS: 1 days | End: 2025-04-05
Payer: COMMERCIAL

## 2025-04-05 ENCOUNTER — APPOINTMENT (OUTPATIENT)
Dept: CT IMAGING | Facility: IMAGING CENTER | Age: 70
End: 2025-04-05

## 2025-04-05 DIAGNOSIS — R05.9 COUGH, UNSPECIFIED: ICD-10-CM

## 2025-04-05 PROCEDURE — 71250 CT THORAX DX C-: CPT | Mod: 26

## 2025-04-05 PROCEDURE — 71250 CT THORAX DX C-: CPT

## 2025-04-09 ENCOUNTER — RX RENEWAL (OUTPATIENT)
Age: 70
End: 2025-04-09

## 2025-04-23 ENCOUNTER — NON-APPOINTMENT (OUTPATIENT)
Age: 70
End: 2025-04-23

## 2025-04-25 ENCOUNTER — INPATIENT (INPATIENT)
Facility: HOSPITAL | Age: 70
LOS: 5 days | Discharge: HOME | End: 2025-05-01
Attending: INTERNAL MEDICINE | Admitting: INTERNAL MEDICINE
Payer: MEDICARE

## 2025-04-25 ENCOUNTER — APPOINTMENT (OUTPATIENT)
Dept: PULMONOLOGY | Facility: CLINIC | Age: 70
End: 2025-04-25
Payer: MEDICARE

## 2025-04-25 VITALS
SYSTOLIC BLOOD PRESSURE: 110 MMHG | BODY MASS INDEX: 33.04 KG/M2 | OXYGEN SATURATION: 90 % | DIASTOLIC BLOOD PRESSURE: 72 MMHG | HEART RATE: 65 BPM | WEIGHT: 175 LBS | TEMPERATURE: 97.3 F | HEIGHT: 61 IN

## 2025-04-25 VITALS
DIASTOLIC BLOOD PRESSURE: 91 MMHG | OXYGEN SATURATION: 97 % | HEART RATE: 65 BPM | TEMPERATURE: 98 F | HEIGHT: 62 IN | RESPIRATION RATE: 18 BRPM | SYSTOLIC BLOOD PRESSURE: 164 MMHG

## 2025-04-25 DIAGNOSIS — J84.116 CRYPTOGENIC ORGANIZING PNEUMONIA: ICD-10-CM

## 2025-04-25 DIAGNOSIS — R55 SYNCOPE AND COLLAPSE: ICD-10-CM

## 2025-04-25 LAB
ADD ON TEST-SPECIMEN IN LAB: SIGNIFICANT CHANGE UP
ALBUMIN SERPL ELPH-MCNC: 4.1 G/DL — SIGNIFICANT CHANGE UP (ref 3.3–5)
ALBUMIN SERPL ELPH-MCNC: 4.4 G/DL — SIGNIFICANT CHANGE UP (ref 3.3–5)
ALP SERPL-CCNC: 83 U/L — SIGNIFICANT CHANGE UP (ref 40–120)
ALP SERPL-CCNC: 87 U/L — SIGNIFICANT CHANGE UP (ref 40–120)
ALT FLD-CCNC: 25 U/L — SIGNIFICANT CHANGE UP (ref 4–33)
ALT FLD-CCNC: 29 U/L — SIGNIFICANT CHANGE UP (ref 4–33)
AMPHET UR-MCNC: NEGATIVE — SIGNIFICANT CHANGE UP
ANION GAP SERPL CALC-SCNC: 10 MMOL/L — SIGNIFICANT CHANGE UP (ref 7–14)
ANION GAP SERPL CALC-SCNC: 13 MMOL/L — SIGNIFICANT CHANGE UP (ref 7–14)
APAP SERPL-MCNC: <10 UG/ML — LOW (ref 15–25)
APPEARANCE UR: CLEAR — SIGNIFICANT CHANGE UP
APTT BLD: 29.5 SEC — SIGNIFICANT CHANGE UP (ref 26.1–36.8)
APTT BLD: 33.3 SEC — SIGNIFICANT CHANGE UP (ref 26.1–36.8)
AST SERPL-CCNC: 16 U/L — SIGNIFICANT CHANGE UP (ref 4–32)
AST SERPL-CCNC: 28 U/L — SIGNIFICANT CHANGE UP (ref 4–32)
BARBITURATES UR SCN-MCNC: NEGATIVE — SIGNIFICANT CHANGE UP
BASOPHILS # BLD AUTO: 0.03 K/UL — SIGNIFICANT CHANGE UP (ref 0–0.2)
BASOPHILS # BLD AUTO: 0.04 K/UL — SIGNIFICANT CHANGE UP (ref 0–0.2)
BASOPHILS NFR BLD AUTO: 0.4 % — SIGNIFICANT CHANGE UP (ref 0–2)
BASOPHILS NFR BLD AUTO: 0.6 % — SIGNIFICANT CHANGE UP (ref 0–2)
BENZODIAZ UR-MCNC: NEGATIVE — SIGNIFICANT CHANGE UP
BILIRUB SERPL-MCNC: 0.2 MG/DL — SIGNIFICANT CHANGE UP (ref 0.2–1.2)
BILIRUB SERPL-MCNC: 0.4 MG/DL — SIGNIFICANT CHANGE UP (ref 0.2–1.2)
BILIRUB UR-MCNC: NEGATIVE — SIGNIFICANT CHANGE UP
BLOOD GAS VENOUS COMPREHENSIVE RESULT: SIGNIFICANT CHANGE UP
BUN SERPL-MCNC: 11 MG/DL — SIGNIFICANT CHANGE UP (ref 7–23)
BUN SERPL-MCNC: 11 MG/DL — SIGNIFICANT CHANGE UP (ref 7–23)
C PEPTIDE SERPL-MCNC: 17.5 NG/ML — HIGH (ref 1.1–4.4)
CALCIUM SERPL-MCNC: 10.2 MG/DL — SIGNIFICANT CHANGE UP (ref 8.4–10.5)
CALCIUM SERPL-MCNC: 10.3 MG/DL — SIGNIFICANT CHANGE UP (ref 8.4–10.5)
CHLORIDE SERPL-SCNC: 100 MMOL/L — SIGNIFICANT CHANGE UP (ref 98–107)
CHLORIDE SERPL-SCNC: 103 MMOL/L — SIGNIFICANT CHANGE UP (ref 98–107)
CK SERPL-CCNC: 71 U/L — SIGNIFICANT CHANGE UP (ref 25–170)
CO2 SERPL-SCNC: 22 MMOL/L — SIGNIFICANT CHANGE UP (ref 22–31)
CO2 SERPL-SCNC: 26 MMOL/L — SIGNIFICANT CHANGE UP (ref 22–31)
COCAINE METAB.OTHER UR-MCNC: NEGATIVE — SIGNIFICANT CHANGE UP
COLOR SPEC: YELLOW — SIGNIFICANT CHANGE UP
CREAT SERPL-MCNC: 0.79 MG/DL — SIGNIFICANT CHANGE UP (ref 0.5–1.3)
CREAT SERPL-MCNC: 0.86 MG/DL — SIGNIFICANT CHANGE UP (ref 0.5–1.3)
CREATININE URINE RESULT, DAU: 12 MG/DL — SIGNIFICANT CHANGE UP
DIFF PNL FLD: NEGATIVE — SIGNIFICANT CHANGE UP
EGFR: 74 ML/MIN/1.73M2 — SIGNIFICANT CHANGE UP
EGFR: 74 ML/MIN/1.73M2 — SIGNIFICANT CHANGE UP
EGFR: 82 ML/MIN/1.73M2 — SIGNIFICANT CHANGE UP
EGFR: 82 ML/MIN/1.73M2 — SIGNIFICANT CHANGE UP
EOSINOPHIL # BLD AUTO: 0.04 K/UL — SIGNIFICANT CHANGE UP (ref 0–0.5)
EOSINOPHIL # BLD AUTO: 0.06 K/UL — SIGNIFICANT CHANGE UP (ref 0–0.5)
EOSINOPHIL NFR BLD AUTO: 0.6 % — SIGNIFICANT CHANGE UP (ref 0–6)
EOSINOPHIL NFR BLD AUTO: 0.8 % — SIGNIFICANT CHANGE UP (ref 0–6)
ETHANOL SERPL-MCNC: <10 MG/DL — SIGNIFICANT CHANGE UP
FENTANYL UR QL SCN: NEGATIVE — SIGNIFICANT CHANGE UP
FLUAV AG NPH QL: SIGNIFICANT CHANGE UP
FLUBV AG NPH QL: SIGNIFICANT CHANGE UP
GLUCOSE BLDC GLUCOMTR-MCNC: 118 MG/DL — HIGH (ref 70–99)
GLUCOSE BLDC GLUCOMTR-MCNC: 131 MG/DL — HIGH (ref 70–99)
GLUCOSE BLDC GLUCOMTR-MCNC: 74 MG/DL — SIGNIFICANT CHANGE UP (ref 70–99)
GLUCOSE BLDC GLUCOMTR-MCNC: 87 MG/DL — SIGNIFICANT CHANGE UP (ref 70–99)
GLUCOSE SERPL-MCNC: 236 MG/DL — HIGH (ref 70–99)
GLUCOSE SERPL-MCNC: 94 MG/DL — SIGNIFICANT CHANGE UP (ref 70–99)
GLUCOSE UR QL: 500 MG/DL
HCT VFR BLD CALC: 44.3 % — SIGNIFICANT CHANGE UP (ref 34.5–45)
HCT VFR BLD CALC: 45.4 % — HIGH (ref 34.5–45)
HGB BLD-MCNC: 13.6 G/DL — SIGNIFICANT CHANGE UP (ref 11.5–15.5)
HGB BLD-MCNC: 14.3 G/DL — SIGNIFICANT CHANGE UP (ref 11.5–15.5)
IANC: 4.44 K/UL — SIGNIFICANT CHANGE UP (ref 1.8–7.4)
IANC: 4.7 K/UL — SIGNIFICANT CHANGE UP (ref 1.8–7.4)
IMM GRANULOCYTES NFR BLD AUTO: 0.4 % — SIGNIFICANT CHANGE UP (ref 0–0.9)
IMM GRANULOCYTES NFR BLD AUTO: 0.6 % — SIGNIFICANT CHANGE UP (ref 0–0.9)
INR BLD: 0.93 RATIO — SIGNIFICANT CHANGE UP (ref 0.85–1.16)
INR BLD: <0.9 RATIO — SIGNIFICANT CHANGE UP (ref 0.85–1.16)
KETONES UR-MCNC: NEGATIVE MG/DL — SIGNIFICANT CHANGE UP
LACTATE SERPL-SCNC: 2 MMOL/L — SIGNIFICANT CHANGE UP (ref 0.5–2)
LEUKOCYTE ESTERASE UR-ACNC: NEGATIVE — SIGNIFICANT CHANGE UP
LYMPHOCYTES # BLD AUTO: 1.92 K/UL — SIGNIFICANT CHANGE UP (ref 1–3.3)
LYMPHOCYTES # BLD AUTO: 2.2 K/UL — SIGNIFICANT CHANGE UP (ref 1–3.3)
LYMPHOCYTES # BLD AUTO: 28.7 % — SIGNIFICANT CHANGE UP (ref 13–44)
LYMPHOCYTES # BLD AUTO: 30.5 % — SIGNIFICANT CHANGE UP (ref 13–44)
MCHC RBC-ENTMCNC: 23.9 PG — LOW (ref 27–34)
MCHC RBC-ENTMCNC: 24.1 PG — LOW (ref 27–34)
MCHC RBC-ENTMCNC: 30.7 G/DL — LOW (ref 32–36)
MCHC RBC-ENTMCNC: 31.5 G/DL — LOW (ref 32–36)
MCV RBC AUTO: 76.6 FL — LOW (ref 80–100)
MCV RBC AUTO: 78 FL — LOW (ref 80–100)
METHADONE UR-MCNC: NEGATIVE — SIGNIFICANT CHANGE UP
MONOCYTES # BLD AUTO: 0.19 K/UL — SIGNIFICANT CHANGE UP (ref 0–0.9)
MONOCYTES # BLD AUTO: 0.23 K/UL — SIGNIFICANT CHANGE UP (ref 0–0.9)
MONOCYTES NFR BLD AUTO: 2.6 % — SIGNIFICANT CHANGE UP (ref 2–14)
MONOCYTES NFR BLD AUTO: 3.4 % — SIGNIFICANT CHANGE UP (ref 2–14)
NEUTROPHILS # BLD AUTO: 4.44 K/UL — SIGNIFICANT CHANGE UP (ref 1.8–7.4)
NEUTROPHILS # BLD AUTO: 4.7 K/UL — SIGNIFICANT CHANGE UP (ref 1.8–7.4)
NEUTROPHILS NFR BLD AUTO: 65.1 % — SIGNIFICANT CHANGE UP (ref 43–77)
NEUTROPHILS NFR BLD AUTO: 66.3 % — SIGNIFICANT CHANGE UP (ref 43–77)
NITRITE UR-MCNC: NEGATIVE — SIGNIFICANT CHANGE UP
NRBC # BLD AUTO: 0 K/UL — SIGNIFICANT CHANGE UP (ref 0–0)
NRBC # BLD AUTO: 0 K/UL — SIGNIFICANT CHANGE UP (ref 0–0)
NRBC # FLD: 0 K/UL — SIGNIFICANT CHANGE UP (ref 0–0)
NRBC # FLD: 0 K/UL — SIGNIFICANT CHANGE UP (ref 0–0)
NRBC BLD AUTO-RTO: 0 /100 WBCS — SIGNIFICANT CHANGE UP (ref 0–0)
NRBC BLD AUTO-RTO: 0 /100 WBCS — SIGNIFICANT CHANGE UP (ref 0–0)
OPIATES UR-MCNC: NEGATIVE — SIGNIFICANT CHANGE UP
OXYCODONE UR-MCNC: NEGATIVE — SIGNIFICANT CHANGE UP
PCP SPEC-MCNC: SIGNIFICANT CHANGE UP
PCP UR-MCNC: NEGATIVE — SIGNIFICANT CHANGE UP
PH UR: 7.5 — SIGNIFICANT CHANGE UP (ref 5–8)
PLATELET # BLD AUTO: 214 K/UL — SIGNIFICANT CHANGE UP (ref 150–400)
PLATELET # BLD AUTO: 215 K/UL — SIGNIFICANT CHANGE UP (ref 150–400)
POTASSIUM SERPL-MCNC: 4 MMOL/L — SIGNIFICANT CHANGE UP (ref 3.5–5.3)
POTASSIUM SERPL-MCNC: 4.5 MMOL/L — SIGNIFICANT CHANGE UP (ref 3.5–5.3)
POTASSIUM SERPL-SCNC: 4 MMOL/L — SIGNIFICANT CHANGE UP (ref 3.5–5.3)
POTASSIUM SERPL-SCNC: 4.5 MMOL/L — SIGNIFICANT CHANGE UP (ref 3.5–5.3)
PROT SERPL-MCNC: 7.6 G/DL — SIGNIFICANT CHANGE UP (ref 6–8.3)
PROT SERPL-MCNC: 8.4 G/DL — HIGH (ref 6–8.3)
PROT UR-MCNC: NEGATIVE MG/DL — SIGNIFICANT CHANGE UP
PROTHROM AB SERPL-ACNC: 10.2 SEC — SIGNIFICANT CHANGE UP (ref 9.9–13.4)
PROTHROM AB SERPL-ACNC: 10.8 SEC — SIGNIFICANT CHANGE UP (ref 9.9–13.4)
RBC # BLD: 5.68 M/UL — HIGH (ref 3.8–5.2)
RBC # BLD: 5.93 M/UL — HIGH (ref 3.8–5.2)
RBC # FLD: 14.6 % — HIGH (ref 10.3–14.5)
RBC # FLD: 14.6 % — HIGH (ref 10.3–14.5)
RSV RNA NPH QL NAA+NON-PROBE: SIGNIFICANT CHANGE UP
SALICYLATES SERPL-MCNC: <0.3 MG/DL — LOW (ref 15–30)
SARS-COV-2 RNA SPEC QL NAA+PROBE: SIGNIFICANT CHANGE UP
SODIUM SERPL-SCNC: 136 MMOL/L — SIGNIFICANT CHANGE UP (ref 135–145)
SODIUM SERPL-SCNC: 138 MMOL/L — SIGNIFICANT CHANGE UP (ref 135–145)
SOURCE RESPIRATORY: SIGNIFICANT CHANGE UP
SP GR SPEC: 1.01 — SIGNIFICANT CHANGE UP (ref 1–1.03)
THC UR QL: NEGATIVE — SIGNIFICANT CHANGE UP
TOXICOLOGY SCREEN, DRUGS OF ABUSE, SERUM RESULT: SIGNIFICANT CHANGE UP
TROPONIN T, HIGH SENSITIVITY RESULT: <6 NG/L — SIGNIFICANT CHANGE UP
TROPONIN T, HIGH SENSITIVITY RESULT: <6 NG/L — SIGNIFICANT CHANGE UP
UROBILINOGEN FLD QL: 0.2 MG/DL — SIGNIFICANT CHANGE UP (ref 0.2–1)
WBC # BLD: 6.7 K/UL — SIGNIFICANT CHANGE UP (ref 3.8–10.5)
WBC # BLD: 7.22 K/UL — SIGNIFICANT CHANGE UP (ref 3.8–10.5)
WBC # FLD AUTO: 6.7 K/UL — SIGNIFICANT CHANGE UP (ref 3.8–10.5)
WBC # FLD AUTO: 7.22 K/UL — SIGNIFICANT CHANGE UP (ref 3.8–10.5)

## 2025-04-25 PROCEDURE — 71260 CT THORAX DX C+: CPT | Mod: 26

## 2025-04-25 PROCEDURE — 99497 ADVNCD CARE PLAN 30 MIN: CPT | Mod: 25

## 2025-04-25 PROCEDURE — 99223 1ST HOSP IP/OBS HIGH 75: CPT | Mod: 25

## 2025-04-25 PROCEDURE — 70498 CT ANGIOGRAPHY NECK: CPT | Mod: 26

## 2025-04-25 PROCEDURE — 71045 X-RAY EXAM CHEST 1 VIEW: CPT | Mod: 26

## 2025-04-25 PROCEDURE — 70491 CT SOFT TISSUE NECK W/DYE: CPT | Mod: 26,59

## 2025-04-25 PROCEDURE — 0042T: CPT

## 2025-04-25 PROCEDURE — 99291 CRITICAL CARE FIRST HOUR: CPT

## 2025-04-25 PROCEDURE — 74177 CT ABD & PELVIS W/CONTRAST: CPT | Mod: 26

## 2025-04-25 PROCEDURE — 70496 CT ANGIOGRAPHY HEAD: CPT | Mod: 26

## 2025-04-25 PROCEDURE — 99233 SBSQ HOSP IP/OBS HIGH 50: CPT

## 2025-04-25 PROCEDURE — 99215 OFFICE O/P EST HI 40 MIN: CPT | Mod: 25

## 2025-04-25 PROCEDURE — 70450 CT HEAD/BRAIN W/O DYE: CPT | Mod: 26,59

## 2025-04-25 RX ORDER — ACETAMINOPHEN 500 MG/5ML
650 LIQUID (ML) ORAL EVERY 6 HOURS
Refills: 0 | Status: DISCONTINUED | OUTPATIENT
Start: 2025-04-25 | End: 2025-05-01

## 2025-04-25 RX ORDER — MELATONIN 5 MG
3 TABLET ORAL AT BEDTIME
Refills: 0 | Status: DISCONTINUED | OUTPATIENT
Start: 2025-04-25 | End: 2025-05-01

## 2025-04-25 RX ORDER — DEXTROSE 50 % IN WATER 50 %
15 SYRINGE (ML) INTRAVENOUS ONCE
Refills: 0 | Status: COMPLETED | OUTPATIENT
Start: 2025-04-25 | End: 2025-04-25

## 2025-04-25 RX ORDER — ONDANSETRON HCL/PF 4 MG/2 ML
4 VIAL (ML) INJECTION EVERY 8 HOURS
Refills: 0 | Status: DISCONTINUED | OUTPATIENT
Start: 2025-04-25 | End: 2025-05-01

## 2025-04-25 RX ORDER — SODIUM CHLORIDE 9 G/1000ML
1000 INJECTION, SOLUTION INTRAVENOUS
Refills: 0 | Status: DISCONTINUED | OUTPATIENT
Start: 2025-04-25 | End: 2025-04-26

## 2025-04-25 RX ORDER — CALCIUM GLUCONATE 20 MG/ML
2 INJECTION, SOLUTION INTRAVENOUS ONCE
Refills: 0 | Status: COMPLETED | OUTPATIENT
Start: 2025-04-25 | End: 2025-04-25

## 2025-04-25 RX ORDER — PREDNISONE 20 MG/1
30 TABLET ORAL DAILY
Refills: 0 | Status: DISCONTINUED | OUTPATIENT
Start: 2025-04-26 | End: 2025-05-01

## 2025-04-25 RX ORDER — MAGNESIUM, ALUMINUM HYDROXIDE 200-200 MG
30 TABLET,CHEWABLE ORAL EVERY 4 HOURS
Refills: 0 | Status: DISCONTINUED | OUTPATIENT
Start: 2025-04-25 | End: 2025-05-01

## 2025-04-25 RX ORDER — ENOXAPARIN SODIUM 100 MG/ML
40 INJECTION SUBCUTANEOUS EVERY 24 HOURS
Refills: 0 | Status: DISCONTINUED | OUTPATIENT
Start: 2025-04-26 | End: 2025-05-01

## 2025-04-25 RX ORDER — SODIUM CHLORIDE 9 G/1000ML
1000 INJECTION, SOLUTION INTRAVENOUS ONCE
Refills: 0 | Status: COMPLETED | OUTPATIENT
Start: 2025-04-25 | End: 2025-04-25

## 2025-04-25 RX ORDER — DEXTROSE 50 % IN WATER 50 %
100 SYRINGE (ML) INTRAVENOUS ONCE
Refills: 0 | Status: DISCONTINUED | OUTPATIENT
Start: 2025-04-25 | End: 2025-04-26

## 2025-04-25 RX ADMIN — SODIUM CHLORIDE 1000 MILLILITER(S): 9 INJECTION, SOLUTION INTRAVENOUS at 16:30

## 2025-04-25 RX ADMIN — Medication 15 GRAM(S): at 13:30

## 2025-04-25 RX ADMIN — CALCIUM GLUCONATE 200 GRAM(S): 20 INJECTION, SOLUTION INTRAVENOUS at 14:30

## 2025-04-25 RX ADMIN — Medication 1000 MILLILITER(S): at 21:23

## 2025-04-25 RX ADMIN — SODIUM CHLORIDE 1000 MILLILITER(S): 9 INJECTION, SOLUTION INTRAVENOUS at 16:49

## 2025-04-25 RX ADMIN — SODIUM CHLORIDE 150 MILLILITER(S): 9 INJECTION, SOLUTION INTRAVENOUS at 15:00

## 2025-04-25 RX ADMIN — Medication 15 GRAM(S): at 13:55

## 2025-04-25 RX ADMIN — Medication 15 GRAM(S): at 14:05

## 2025-04-25 NOTE — ED PROVIDER NOTE - CLINICAL SUMMARY MEDICAL DECISION MAKING FREE TEXT BOX
Unknown etiology of pt's syncopal/stuporous episodes. Pt's vitals are stable and wnl during these episodes. Will jain scan for possible malignancy labs for electrolyte imbalances, and toxic/metabolic etiology. dispo: admission

## 2025-04-25 NOTE — H&P ADULT - CONVERSATION DETAILS
Pt would like to remain FULL CODE.  If required, the pt would like the following interventions: CPR and/or intubation with mechanical ventilation.    If she is unable to make her own medical decisions she would like her children (son and daughter) to do so. She reports having the appropriate paperwork filled out. Daughter at bedside confirmed.

## 2025-04-25 NOTE — ED ADULT TRIAGE NOTE - HEIGHT IN FEET
Patient is asleep easy to arouse. On vapotherm 30L 70% saturation 96%. No fall/injury. Side rails up x2. Positioned bed to lowest. Hand off report given to nurse Christoph.   5

## 2025-04-25 NOTE — ED ADULT NURSE NOTE - OBJECTIVE STATEMENT
pt received to room 27, a&ox4 and ambulatory at baseline, sent by MD for syncope while getting blood drawn  no acute distress noted at this time. respirations even and nonlabored on room air. comfort and safety maintained. pt pending pt received to room 27, a&ox4 and ambulatory at baseline, sent by MD for syncope while getting blood drawn. during assessment, pt had syncopal episode, awakes minimally to sternal rub, protecting own airway, placed on zoll, FS 96, given oral glucose per Dr. Navas verbal order - pt became more awake and alert, oriented x4. pt had sequential episodes, brought to CT, during CT, pt had episode of being unable to speak, face appeared to have a droop, drooling noted. code stroke activated, scans completed. pt jaw appeared to be locked, facial massage preformed by MD and pt now able to speak and maintain airway. neuro at bedside for eval. code stroke cancelled. pt able to recall events that occurred at CT. comfort and safety maintained. pt awaiting lab results. daughter at bedside.

## 2025-04-25 NOTE — H&P ADULT - NSHPADDITIONALINFOADULT_GEN_ALL_CORE
Medication list confirmed with pt at bedside and pharmacy completed medrec Medication list confirmed with pt at bedside and pharmacy completed medrec    Pt on bedrest and PT consult not ordered pending above results

## 2025-04-25 NOTE — STROKE CODE NOTE - CODE STROKE CANCEL
History of, secondary to vascular disease, ongoing antiplatelet therapy, high intensity statin  Smoking cessation paramount   No

## 2025-04-25 NOTE — ED PROVIDER NOTE - ATTENDING CONTRIBUTION TO CARE
69-year-old female with past medical history of cryptogenic pneumonia/COPD on prednisone sent in by pulmonologist for syncope during a blood draw.  As per the daughter at the bedside patient has been eating and drinking normally.  No history of similar symptoms in the past.  Resident physician went into evaluate the patient first and called me to bedside as patient was having an episode of syncope.  No arrhythmia noted on the cardiac monitor.  Patient is placed on pads.  Blood glucose noted to be 91 -patient is immediately given oral glucose which improved patient's mental status.  Patient started answering all of the questions appropriately.  Patient is complaining of headache otherwise denying any symptoms reports she is in her baseline health.  No dizziness, changes in vision, chest pain, difficulty breathing, abdominal pain, nausea, vomiting, fevers, chills, change in strength or sensation in the extremities.  There was some difficulty obtaining IV access during which patient passed out twice more.  Patient is given more oral glucose.  As soon as IV access is placed patient is given 2 A of glucose.  Will plan to do CT head, chest, abdomen pelvis to determine the cause of patient's symptoms including mass.  While patient was in the CT scan room patient had another episode of syncope and started developing facial droop, drooling, watery eyes.  Patient looks very uncomfortable.  Code stroke was immediately activated.  Upon close observation noted that patient seemed to be having TMJ issue.  Massage patient's TMJs bilaterally which improved the patient's face symmetry and patient started talking normally.  Reports she felt like her jaw was misplaced and was having difficulty talking and opening closing her mouth.  Patient is given 2 g of calcium prior to the CTs and started on IV fluids.    Shared decision making with neurology team to continue the code stroke and do CTAs of the head, neck to eval for any stenosis that could be causing recurrence of syncope.  Patient had similar episode in front of the neurology team which resolved with facial massage over TMJs again.  No concern for seizure.  When patient is having the episode she is providing some information which she is able to retain after her symptoms improved.    Patient's glucose is noted to be downtreding. Pt is started on dextrose drip once she is back in her room.  CTs are noted to be nonactionable.  Blood work is nonactionable.  MICU is consulted for dextrose drip, multiple syncopes, every fingersticks.  Patient is signed out to Dr. Henning pending MICU recommendations. Neuro recs are pending.    Of note patient does not have any history of diabetes.  Denies insulin use.

## 2025-04-25 NOTE — H&P ADULT - PROBLEM SELECTOR PLAN 2
Per Pulmonary note: "Increase prednisone to 30mg daily x 2 weeks, then down to 20mg daily. "  Will increase prednisone from 10mg daily to 30 mg daily, continue PPI

## 2025-04-25 NOTE — CONSULT NOTE ADULT - ASSESSMENT
INCOMPLETE     NIHSS:    mRS:    Patient is not a thrombolytic candidate because they are outside of the appropriate window of treatment.  Patient is not a mechanical thrombectomy candidate because no evidence of LVO.    Impression:   1) Difficulty speaking a/w ?lockjaw of unclear etiology, appears less likely to be neurologic as not a language issue or dysarthria. Perhaps a mechanical issues speaking vs encephalopathy from underlying toxic metabolic causes such as electrolyte disturbance. Low suspicion for aphasia from brain dysfunction.   2) Patient experienced a syncopal episode following a blood draw which appeared consistent with vasovagal syncope. However, she had multiple episodes of pre-syncope/syncope after this and was treated for hypoglycemia; her blood sugar continued to decline. It is unclear the etiology of these constellation of symptoms but perhaps toxic metabolic in nature. Low suspicion for seizure.    INCOMPLETE     NIHSS:    mRS:    Patient is not a thrombolytic candidate because they are outside of the appropriate window of treatment.  Patient is not a mechanical thrombectomy candidate because no evidence of LVO.    Impression:   1) Difficulty speaking a/w ?lockjaw of unclear etiology, appears less likely to be neurologic as not a language issue or dysarthria. Rule out underlying toxic metabolic causes such as electrolyte disturbance. Low suspicion for aphasia from brain dysfunction such as stroke.   2) Patient experienced a syncopal episode following a blood draw which appeared consistent with vasovagal syncope. However, she had multiple episodes of pre-syncope/syncope after this and was treated for hypoglycemia; her blood sugar continued to decline. It is unclear the etiology of these constellation of symptoms but perhaps toxic metabolic in nature. Low suspicion for seizure.     Recommendations             Patient case discussed with stroke fellow, Dr. Lopez, under supervision of stroke attending, Dr. White.  Assessment / Summary  -69year old right handed woman hx cryptogenic organizing pneumonia on chronic steroids, JASEN, prior syncope  -Initially presents to ED for syncope during blood draw at pulm office (there for checkup)  -in ED recurrent syncope as well as lock jaw (limits speech, prompting code stroke). Glucose 92. Given sugar as thought contributing to her syncope. When jaw tightens its stuck open, she cannot talk. Seems to arch her back.  -Vitals in ED stable. /91, normoxic, afebrile. Satting well on RA  -Neuro exam benign between episodes. Occasional LOC and gradual return to normalcy. Occasional tightening of the jaw, stuck in open position- during these no loss of awareness and able to follow commands. Also right leg cramp.   -Labs with microcytosis, normal coags, grossly normal CMP including normal calcium. Lactate 2.2, pH 7.35. pCO2 47\  -CTH w/ sensecent changes and microvascular disease.   -CTA/CTP unrevealing to me. await radiology report.     LKN: 4/25/25  NIHSS: 0  preMRS: 0   ICH Score: Not applicable   ABCD2 Score: Not applicable   Pt is not a candidate for tenecteplase due to no deficits, intermittent symptoms not due to stroke   Pt is not a candidate for mechanical throm    Impression:   1)Difficulty speaking a/w ?lockjaw of unclear etiology, appears less likely to be neurologic as not a language issue or dysarthria. Rule out underlying toxic metabolic causes such as electrolyte disturbance. This is not aphasia from brain dysfunction such as stroke.   2) Patient experienced a syncopal episode following a blood draw which appeared consistent with vasovagal syncope. However, she had multiple episodes of pre-syncope/syncope after this and was treated with glucose    Overall:  Intermittent tetany  intermittent syncope  Unclear etiology.     Recommendations:  -Check ionized calcium, PTH, PTrH, Vitamin-D-25OH, tetanus antibodies  -Added on magnesium (she takes supplements but is perhaps on a PPI? shes not sure the name of the med)  -Added on phosphate  -Tox screen  -EKG/Tele/echo for syncope workup  will discuss with attending         Patient case and decision regaridng tenecteplase, thrombectomy, and antiplatelet agents discussed with stroke fellow, Dr. Lopez, under supervision of stroke attending, Dr. White.   Plan NOT YET discussed with general neuro attending Assessment / Summary  -69year old right handed woman hx cryptogenic organizing pneumonia on chronic steroids, JASEN, prior syncope  -Initially presents to ED for syncope during blood draw at pulm office (there for checkup)  -in ED recurrent syncope as well as lock jaw (limits speech, prompting code stroke). Glucose 92. Given sugar as thought contributing to her syncope. When jaw tightens its stuck open, she cannot talk. Seems to arch her back.  -Vitals in ED stable. /91, normoxic, afebrile. Satting well on RA  -Neuro exam benign between episodes. Occasional LOC and gradual return to normalcy. Occasional tightening of the jaw, stuck in open position- during these no loss of awareness and able to follow commands. Also right leg cramp.   -Labs with microcytosis, normal coags, grossly normal CMP including normal calcium. Lactate 2.2, pH 7.35. pCO2 47  -CTH w/ sensecent changes and microvascular disease.   -CTA/CTP no core/penumbra. 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    LKN: 4/25/25  NIHSS: 0  preMRS: 0   ICH Score: Not applicable   ABCD2 Score: Not applicable   Pt is not a candidate for tenecteplase due to no deficits, intermittent symptoms not due to stroke   Pt is not a candidate for mechanical throm    Impression:   1)Difficulty speaking a/w ?lockjaw of unclear etiology, appears less likely to be neurologic as not a language issue or dysarthria. Rule out underlying toxic metabolic causes such as electrolyte disturbance. This is not aphasia from brain dysfunction such as stroke.   2) Patient experienced a syncopal episode following a blood draw which appeared consistent with vasovagal syncope. However, she had multiple episodes of pre-syncope/syncope after this and was treated with glucose    Overall:  Intermittent Trismus  intermittent syncope  Unclear etiology.     Recommendations:  -Check ionized calcium, PTH, PTrH, Vitamin-D-25OH, tetanus antibodies, CPK  -Added on magnesium (she takes supplements but is perhaps on a PPI? shes not sure the name of the med)  -Added on phosphate  -Tox screen  -EKG/Tele/echo for syncope workup    Patient case and decision regarding tenecteplase, thrombectomy, and antiplatelet agents discussed with stroke fellow, Dr. Lopez, under supervision of stroke attending, Dr. White.   Plan discussed with general neuro attending as well  Assessment / Summary  -69year old right handed woman hx cryptogenic organizing pneumonia on chronic steroids, JASEN, prior syncope  -Initially presents to ED for syncope during blood draw at pulm office (there for checkup)  -in ED recurrent syncope as well as lock jaw (limits speech, prompting code stroke). Glucose 92. Given sugar as thought contributing to her syncope. When jaw tightens its stuck open, she cannot talk. Seems to arch her back.  -Vitals in ED stable. /91, normoxic, afebrile. Satting well on RA  -Neuro exam benign between episodes. Occasional LOC and gradual return to normalcy. Occasional tightening of the jaw, stuck in open position- during these no loss of awareness and able to follow commands. Also right leg cramp.   -Labs with microcytosis, normal coags, grossly normal CMP including normal calcium. Lactate 2.2, pH 7.35. pCO2 47  -CTH w/ sensecent changes and microvascular disease.   -CTA/CTP no core/penumbra. 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    LKN: 4/25/25  NIHSS: 0  preMRS: 0   ICH Score: Not applicable   ABCD2 Score: Not applicable   Pt is not a candidate for tenecteplase due to no deficits, intermittent symptoms not due to stroke   Pt is not a candidate for mechanical throm    Impression:   1)Difficulty speaking a/w ?lockjaw of unclear etiology, appears less likely to be neurologic as not a language issue or dysarthria. Rule out underlying toxic metabolic causes such as electrolyte disturbance. This is not aphasia from brain dysfunction such as stroke.   2) Patient experienced a syncopal episode following a blood draw which appeared consistent with vasovagal syncope. However, she had multiple episodes of pre-syncope/syncope after this and was treated with glucose    Overall:  Intermittent Trismus.   intermittent syncope.   Unclear etiology. ?TMJ ?Dehydration    Recommendations:  -Check ionized calcium, PTH, PTrH, Vitamin-D-25OH, tetanus antibodies, CPK  -Added on magnesium (she takes supplements but is perhaps on a PPI? shes not sure the name of the med)  -Added on phosphate  -Tox screen  -EKG/Tele/echo for syncope workup    Patient case and decision regarding tenecteplase, thrombectomy, and antiplatelet agents discussed with stroke fellow, Dr. Lopez, under supervision of stroke attending, Dr. White.   Plan discussed with general neuro attending as well

## 2025-04-25 NOTE — CONSULT NOTE ADULT - SUBJECTIVE AND OBJECTIVE BOX
HPI:    (Stroke only)  NIHSS:   MRS:   ICH:     REVIEW OF SYSTEMS  General:	  Skin/Breast:	  Ophthalmologic:  ENMT:	  Respiratory and Thorax:	  Cardiovascular:	  Gastrointestinal:	  Genitourinary:	  Musculoskeletal:	  Neurological:	  Psychiatric:	  Hematology/Lymphatics:	  Endocrine:	  Allergic/Immunologic:	    A 10-system ROS was performed and is negative except for those items noted above and/or in the HPI.    PAST MEDICAL & SURGICAL HISTORY:  Cryptogenic Organizing Pneumonia      Iron Deficiency Anemia      PNA (pneumonia)      No significant past surgical history        FAMILY HISTORY:    SOCIAL HISTORY:   T/E/D:   Occupation:   Lives with:     MEDICATIONS (HOME):  Home Medications:    MEDICATIONS  (STANDING):  dextrose 5% + sodium chloride 0.9%. 1000 milliLiter(s) (150 mL/Hr) IV Continuous <Continuous>  dextrose 50% Injectable 100 milliLiter(s) IV Push Once  dextrose Oral Gel 15 Gram(s) Oral once  dextrose Oral Gel 15 Gram(s) Oral once  dextrose Oral Gel 15 Gram(s) Oral once    MEDICATIONS  (PRN):    ALLERGIES/INTOLERANCES:  Allergies  No Known Allergies    Intolerances    VITALS & EXAMINATION:  Vital Signs Last 24 Hrs  T(C): 36.9 (25 Apr 2025 12:22), Max: 36.9 (25 Apr 2025 12:22)  T(F): 98.4 (25 Apr 2025 12:22), Max: 98.4 (25 Apr 2025 12:22)  HR: 65 (25 Apr 2025 12:22) (65 - 65)  BP: 164/91 (25 Apr 2025 12:22) (164/91 - 164/91)  BP(mean): --  RR: 18 (25 Apr 2025 12:22) (18 - 18)  SpO2: 97% (25 Apr 2025 12:22) (97% - 97%)        General:  Constitutional: Female, appears stated age, in no apparent distress including pain  Head: Normocephalic & Atraumatic.  Respiratory: Breathing comfortably.  Extremities: No cyanosis, clubbing, or edema    Neurological:  MS: Awake, alert, oriented to person, place, situation, time. Follows all commands.    Language: Speech is clear, fluent with good repetition & comprehension.    CNs: PERRL (R = 3mm, L = 3mm). VFF. EOMI no nystagmus. V1-3 intact to LT b/l. No facial asymmetry b/l, full eye closure strength b/l. Hearing grossly normal (rubbing fingers) b/l. Tongue midline, normal movements, no atrophy.     Motor: Normal muscle bulk & tone. No noticeable tremor. No pronator drift.              Deltoid	Biceps	Triceps	   R	         5	             5	              5	 5	  		 	  L	         5	             5	              5	 5	  		    	H-Flex	K-Flex	K-Ext	D-Flex	P-Flex  R	    5	            5	           5	            5	           5		   L	    5	            5	           5	            5	           5		     Sensation: Intact to LT b/l throughout.     Cortical: Extinction on DSS (neglect): none    Reflexes:              Biceps(C5)       BR(C6)     Triceps(C7)               Patellar(L4)    Achilles(S1)    Plantar Resp  R	              2	          2	             2		                              2		    2		      Down   L	              2	          2	             2		                              2		    2		      Down     Coordination: intact rapid-alt movements. No dysmetria to FTN/HTS    Gait: Deferred         LABORATORY:  CBC                       14.3   7.22  )-----------( 214      ( 25 Apr 2025 13:50 )             45.4     Chem 04-25    138  |  103  |  11  ----------------------------<  94  4.5   |  22  |  0.79    Ca    10.2      25 Apr 2025 13:50    TPro  8.4[H]  /  Alb  4.4  /  TBili  0.2  /  DBili  x   /  AST  28  /  ALT  29  /  AlkPhos  87  04-25    LFTs LIVER FUNCTIONS - ( 25 Apr 2025 13:50 )  Alb: 4.4 g/dL / Pro: 8.4 g/dL / ALK PHOS: 87 U/L / ALT: 29 U/L / AST: 28 U/L / GGT: x           Coagulopathy PT/INR - ( 25 Apr 2025 13:50 )   PT: 10.2 sec;   INR: <0.90 ratio         PTT - ( 25 Apr 2025 13:50 )  PTT:33.3 sec  Lipid Panel   A1c   Cardiac enzymes     U/A Urinalysis Basic - ( 25 Apr 2025 13:50 )    Color: x / Appearance: x / SG: x / pH: x  Gluc: 94 mg/dL / Ketone: x  / Bili: x / Urobili: x   Blood: x / Protein: x / Nitrite: x   Leuk Esterase: x / RBC: x / WBC x   Sq Epi: x / Non Sq Epi: x / Bacteria: x      CSF  Immunological  Other    STUDIES & IMAGING:  Studies (EKG, EEG, EMG, etc):     Radiology (XR, CT, MR, U/S, TTE/TONE): History:  HPI:   NÉSTOR SIMPSON is a 69year old rogjt-handed Woman with PMHx cryptogenic organizing pneumonia on chronic steroids, JASEN, prior syncope p/w syncope. She presented initially to outpatient pulm for routine check up. Had blood draw and subsequent syncope. Referred to ED.  In ED had recurrent syncope and, separately lock jaw. Glucose 92. ED treating with oral glucose (didnt yet have IV). Neurology consulted / code stroke for difficulty talking.     Since presenting to the ED she has synopsized multiple times with stable vitals. A little after 1400 (LKW) she had tightening of the jaw and inability to speak lasting 2 minutes prompting code stroke. Smp toms resolves after (not necessarily because of) b/l TMJ massage. She maintained awareness during this (no LOC at that time). Similar episodes occuring during neuroloigcal encounter. She could follow commands and had good recolleciton of the event. Never had anything similar previously. Recent travel to [place] mostly indoors. No infectious symptoms. No recent wounds. During [place]outner continued to have episodic jaw locking limiting ability to speak, intermittent syncope with return to normalcy, and 1x episode of right calf pain/muscle spam/yolanda horse.    LKN: 4/25/25  NIHSS: 0  preMRS: 0   ICH Score: Not applicable   ABCD2 Score: Not applicable   Pt is not a candidate for tenecteplase due to no deficits, intermittent symptoms not due to stroke   Pt is not a candidate for mechanical thrombectomy due to no large vessel occlusion on CTA    Review of Systems:    CONSTITUTIONAL: No fevers or chills  EYES AND ENT: No visual changes or no throat pain   NECK: No pain or stiffness  RESPIRATORY: No hemoptysis or shortness of breath  CARDIOVASCULAR: No chest pain or palpitations  GASTROINTESTINAL: No melena or hematochezia  GENITOURINARY: No dysuria or hematuria  NEUROLOGICAL: +As stated in HPI above  SKIN: No itching, burning, rashes, or lesions   All other review of systems is negative unless indicated above.    PMHx:  Cryptogenic Organizing Pneumonia  Iron Deficiency Anemia  PNA (pneumonia)    Medications  Home Medications:    MEDICATIONS  (STANDING):  dextrose 5% + sodium chloride 0.9%. 1000 milliLiter(s) (150 mL/Hr) IV Continuous <Continuous>  dextrose 50% Injectable 100 milliLiter(s) IV Push Once  dextrose Oral Gel 15 Gram(s) Oral once  dextrose Oral Gel 15 Gram(s) Oral once  dextrose Oral Gel 15 Gram(s) Oral once    MEDICATIONS  (PRN):      Exam:  Vitals:  T(C): 36.9 (04-25-25 @ 12:22), Max: 36.9 (04-25-25 @ 12:22)  T(F): 98.4 (04-25-25 @ 12:22), Max: 98.4 (04-25-25 @ 12:22)  HR: 65 (04-25-25 @ 12:22) (65 - 65)  BP: 164/91 (04-25-25 @ 12:22) (164/91 - 164/91)  RR: 18 (04-25-25 @ 12:22) (18 - 18)  SpO2: 97% (04-25-25 @ 12:22) (97% - 97%)  I&O's Summary    Physical and Neurological Examination:   - General:  When awake, nontoxic  occasional locking of jaw during which she looks uncomfortable, some arching of back, follows commands  frequent LoC usually resolving within 1-2 minutes, stable vitals  fleshy protuberance / nodule on ceiling of mouth     - Mental Status:   level of consciousness: Awake, alert  Orientation: Oriented to person, age, place, and time  Language: Speech is fluent with intact naming, repetition, and ability to follow commands (including simple commands and complex cross commands)  Cortical Signs: No extinction to visual or tactile DSS, no hemineglect.   Content: Good overall fund of knowledge (aware of current events,and relevant past history)    - Cranial Nerves:   PERRL, VFF, EOMI, facial sensation is intact in the V1-V3 distribution bilaterally; face is symmetric with coco smile; hearing is intact to finger rub bilaterally and equally; uvula is midline and soft palate rises symmetrically; shoulder shrug intact; tongue protrudes in the midline with intact lateral movements    - Motor Testing:  Bulk: Normal   Tone: Normal  Strength:  There is no pronator drift b/l  There is no leg drift b/l though during right leg raise she develops painful cramp    - Sensory: Intact throughout to light touch.   - Coordination: Finger-nose-finger intact without dysmetria.    - Gait: not tested   Objective Studies  Labs:             14.3   7.22  )-----------( 214      ( 25 Apr 2025 13:50 )             45.4   138  |  103  |  11  ----------------------------<  94  4.5   |  22  |  0.79  Ca    10.2      25 Apr 2025 13:50  TPro  8.4[H]  /  Alb  4.4  /  TBili  0.2  /  DBili  x   /  AST  28  /  ALT  29  /  AlkPhos  87  04-25  PT/INR - ( 25 Apr 2025 13:50 )   PT: 10.2 sec;   INR: <0.90 ratio    PTT - ( 25 Apr 2025 13:50 )  PTT:33.3 sec  Lactate Trend  POCT Blood Glucose.: 246 mg/dL (25 Apr 2025 14:42)  PT/INR - ( 25 Apr 2025 13:50 )   PT: 10.2 sec;   INR: <0.90 ratio    PTT - ( 25 Apr 2025 13:50 )  PTT:33.3 sec    ~~~~  CT Head  FINDINGS:  VENTRICLES AND SULCI: Normal in size and configuration.  INTRA-AXIAL: No mass effect, acute hemorrhage, or midline shift.  There   are periventricular and subcortical white matter hypodensities,   consistent with microvascular type changes.  EXTRA-AXIAL: No mass or fluid collection. Basal cisterns are normal in   appearance.  VISUALIZED SINUSES:  Clear.  TYMPANOMASTOID CAVITIES:  Clear.  VISUALIZED ORBITS: Normal.  CALVARIUM: Intact.  MISCELLANEOUS: None.  IMPRESSION:  No evidence of acute intracranial pathology. If clinical symptoms persist   or worsen, more sensitive evaluation with brain MRI may be obtained, if   no contraindications exist.   History:  HPI:   NÉSTOR SIMPSON is a 69year old right-handed Woman with PMHx cryptogenic organizing pneumonia on steroids (pred 10 x 1 month), JASEN, prior syncope p/w syncope. She presented initially to outpatient pulm for routine check up. Had blood draw and subsequent syncope. Referred to ED.  In ED had recurrent syncope and, separately jaw stuck sensation, Glucose 92. ED treating with oral glucose (didnt yet have IV). Neurology consulted / code stroke for difficulty talking.     Since presenting to the ED she has lost consciousness multiple times with stable vitals. A little after 1400 (LKW) she had tightening of the jaw and inability to speak lasting 2 minutes prompting code stroke. Symptoms resolved after (not necessarily because of) b/l TMJ massage. She maintained awareness during this (no LOC at that time). Similar episodes occurring during neurological encounter. She could follow commands and had good recollection of the event. Never had anything similar previously. Does often get leg cramping. Recent travel to [place] mostly indoors. No infectious symptoms. No recent wounds. During encounter continued to have episodic jaw locking limiting ability to speak, intermittent syncope with return to normalcy, and 1x episode of right calf pain/muscle spam / Lex horse.    LKN: 4/25/25  NIHSS: 0  preMRS: 0   ICH Score: Not applicable   ABCD2 Score: Not applicable   Pt is not a candidate for tenecteplase due to no deficits, intermittent symptoms not due to stroke   Pt is not a candidate for mechanical thrombectomy due to no large vessel occlusion on CTA    Review of Systems:    CONSTITUTIONAL: No fevers or chills  EYES AND ENT: No visual changes or no throat pain   NECK: No pain or stiffness  RESPIRATORY: No hemoptysis or shortness of breath  CARDIOVASCULAR: No chest pain or palpitations  GASTROINTESTINAL: No melena or hematochezia  GENITOURINARY: No dysuria or hematuria  NEUROLOGICAL: +As stated in HPI above  SKIN: No itching, burning, rashes, or lesions   All other review of systems is negative unless indicated above.    PMHx:  Cryptogenic Organizing Pneumonia  Iron Deficiency Anemia  PNA (pneumonia)    Medications  Home Medications:    MEDICATIONS  (STANDING):  dextrose 5% + sodium chloride 0.9%. 1000 milliLiter(s) (150 mL/Hr) IV Continuous <Continuous>  dextrose 50% Injectable 100 milliLiter(s) IV Push Once  dextrose Oral Gel 15 Gram(s) Oral once  dextrose Oral Gel 15 Gram(s) Oral once  dextrose Oral Gel 15 Gram(s) Oral once    MEDICATIONS  (PRN):      Exam:  Vitals:  T(C): 36.9 (04-25-25 @ 12:22), Max: 36.9 (04-25-25 @ 12:22)  T(F): 98.4 (04-25-25 @ 12:22), Max: 98.4 (04-25-25 @ 12:22)  HR: 65 (04-25-25 @ 12:22) (65 - 65)  BP: 164/91 (04-25-25 @ 12:22) (164/91 - 164/91)  RR: 18 (04-25-25 @ 12:22) (18 - 18)  SpO2: 97% (04-25-25 @ 12:22) (97% - 97%)  I&O's Summary    Physical and Neurological Examination:   - General:  When awake, nontoxic  occasional locking of jaw during which she looks uncomfortable, some arching of back, follows commands  frequent LoC usually resolving within 1-2 minutes, stable vitals  fleshy protuberance / nodule on ceiling of mouth     - Mental Status:   level of consciousness: Awake, alert  Orientation: Oriented to person, age, place, and time  Language: Speech is fluent with intact naming, repetition, and ability to follow commands (including simple commands and complex cross commands)  Cortical Signs: No extinction to visual or tactile DSS, no hemineglect.   Content: Good overall fund of knowledge (aware of current events,and relevant past history)    - Cranial Nerves:   PERRL, VFF, EOMI, facial sensation is intact in the V1-V3 distribution bilaterally; face is symmetric with coco smile; hearing is intact to finger rub bilaterally and equally; uvula is midline and soft palate rises symmetrically; shoulder shrug intact; tongue protrudes in the midline with intact lateral movements    - Motor Testing:  Bulk: Normal   Tone: Normal  Strength:  There is no pronator drift b/l  There is no leg drift b/l though during right leg raise she develops painful cramp    - Reflexes: 2+ b/l Bi/Tri/Br/Pat. 2+ right ankle. Absent left ankle jerk  - Sensory: Intact throughout to light touch.   - Coordination: Finger-nose-finger intact without dysmetria.    - Gait: not tested due to recurrent frequent LoC  Objective Studies  Labs:             14.3   7.22  )-----------( 214      ( 25 Apr 2025 13:50 )             45.4   138  |  103  |  11  ----------------------------<  94  4.5   |  22  |  0.79  Ca    10.2      25 Apr 2025 13:50  TPro  8.4[H]  /  Alb  4.4  /  TBili  0.2  /  DBili  x   /  AST  28  /  ALT  29  /  AlkPhos  87  04-25  PT/INR - ( 25 Apr 2025 13:50 )   PT: 10.2 sec;   INR: <0.90 ratio    PTT - ( 25 Apr 2025 13:50 )  PTT:33.3 sec  Lactate Trend  POCT Blood Glucose.: 246 mg/dL (25 Apr 2025 14:42)  PT/INR - ( 25 Apr 2025 13:50 )   PT: 10.2 sec;   INR: <0.90 ratio    PTT - ( 25 Apr 2025 13:50 )  PTT:33.3 sec    ~~~~  CT Head  FINDINGS:  VENTRICLES AND SULCI: Normal in size and configuration.  INTRA-AXIAL: No mass effect, acute hemorrhage, or midline shift.  There   are periventricular and subcortical white matter hypodensities,   consistent with microvascular type changes.  EXTRA-AXIAL: No mass or fluid collection. Basal cisterns are normal in   appearance.  VISUALIZED SINUSES:  Clear.  TYMPANOMASTOID CAVITIES:  Clear.  VISUALIZED ORBITS: Normal.  CALVARIUM: Intact.  MISCELLANEOUS: None.  IMPRESSION:  No evidence of acute intracranial pathology. If clinical symptoms persist   or worsen, more sensitive evaluation with brain MRI may be obtained, if   no contraindications exist.    CTA/CTP    FINDINGS:    CT PERFUSION:    TECHNICAL LIMITATIONS: None.    CBF<30%/CORE INFARCTION: 0 mL  TMAX>6s/UNDERPERFUSED TISSUE: 0 mL  MISMATCH VOLUME/TISSUE AT RISK: 0 mL  MISMATCH RATIO: None.    MISCELLANEOUS: None.      CTA NECK:    AORTIC ARCH AND VISUALIZED GREAT VESSELS: Within normal limits.    RIGHT:  COMMON CAROTID ARTERY: No significant stenosis to the carotid bifurcation.  INTERNAL CAROTID ARTERY: Atherosclerosis of the proximal internal carotid   artery contributing to mild luminal stenosis.  VERTEBRAL ARTERY: Normal in course and caliber to the intracranial   circulation.    LEFT:  COMMON CAROTID ARTERY: No significant stenosis to the carotid bifurcation.  INTERNAL CAROTID ARTERY: No significant stenosis based on NASCET criteria.  VERTEBRAL ARTERY: Severe short segment stenosis of the V1 segment of the   left vertebral artery. Remainder of the cervical vertebral arteries   patent.    VISUALIZED LUNGS: Bilateral groundglass airspace opacities.    MISCELLANEOUS: None.    CAROTID STENOSIS REFERENCE: Percent (%) stenosis is expressed in terms of   NASCET Criteria. (NASCET = 100x1-(N/D)). N=greatest narrowing. D=normal   distal diameter - MILD = <50% stenosis. - MODERATE = 50-69% stenosis. -   SEVERE = 70-89% stenosis. - HAIRLINE/CRITICAL = 90-99% stenosis. -   OCCLUDED = 100% stenosis.      CTA HEAD:    INTERNAL CAROTID ARTERIES: Bilateral petrous, precavernous, cavernous,   and supraclinoid regions are patent without significant stenosis.    [place]: No aneurysm identified. Tiny aneurysms can be beyond   the resolution of CTA technique.    ANTERIOR CEREBRAL ARTERIES: No significant stenosis or occlusion.  MIDDLE CEREBRAL ARTERIES: No significant stenosis or occlusion.  POSTERIOR CEREBRAL ARTERIES: No significant stenosis or occlusion.    DISTAL VERTEBRAL / BASILAR ARTERIES: No significant stenosis or occlusion.    VENOUS STRUCTURES: Visualized superficial and deep venous structures   appear patent.    MISCELLANEOUS: No other vascular abnormality is seen.    CT SOFT TISSUE NECK:    AERODIGESTIVE TRACT:  Nasopharynx, oropharynx and hypopharynx are normal   in appearance. Larynx and visualized trachea are normal. Visualized   esophagus is normal,  LYMPH NODES:  No adenopathy.  PAROTID GLANDS:  Normal.  SUBMANDIBULAR GLANDS:  Normal.  THYROID GLAND:  Normal.  VISUALIZED SINUSES:  Clear.  VISUALIZED TYMPANOMASTOID CAVITIES: Clear.      IMPRESSION:    CT PERFUSION:  Technical limitations: None.    Core infarction: 0 ml  Penumbra / tissue at risk for active ischemia: 0 ml    CTA NECK:  Short segment severe stenosis of the V1 segment of the left vertebral   artery. Otherwise, no evidence of significant stenosis or occlusion.    CTA HEAD:  No large vessel occlusion, significant stenosis or vascular abnormality   identified.    CT SOFT TISSUE NECK:  No cervical mass or adenopathy identified.     History:  HPI:   NÉSTOR SIMPSON is a 69year old right-handed Woman with PMHx cryptogenic organizing pneumonia on steroids (pred 10 daily x 1 month), JASEN, prior syncope p/w syncope. She presented initially to outpatient pulm for routine check up. Had blood draw and subsequent syncope. Referred to ED.  In ED had recurrent syncope and, separately jaw stuck sensation, Glucose 92. ED treating with oral glucose (didnt yet have IV). Neurology consulted / code stroke for difficulty talking.     Since presenting to the ED she has lost consciousness multiple times with stable vitals. A little after 1400 (LKW) she had tightening of the jaw and inability to speak lasting 2 minutes prompting code stroke. Symptoms resolved after (not necessarily because of) b/l TMJ massage. She maintained awareness during this (no LOC at that time). Similar episodes occurring during neurological encounter. She could follow commands and had good recollection of the event. Never had anything similar previously. Does often get leg cramping. Recent travel to [place] mostly indoors. No infectious symptoms. No recent wounds. During encounter continued to have episodic jaw locking limiting ability to speak, intermittent syncope with return to normalcy, and 1x episode of right calf pain/muscle spam / Lex horse.    LKN: 4/25/25  NIHSS: 0  preMRS: 0   ICH Score: Not applicable   ABCD2 Score: Not applicable   Pt is not a candidate for tenecteplase due to no deficits, intermittent symptoms not due to stroke   Pt is not a candidate for mechanical thrombectomy due to no large vessel occlusion on CTA    Review of Systems:    CONSTITUTIONAL: No fevers or chills  EYES AND ENT: No visual changes or no throat pain   NECK: No pain or stiffness  RESPIRATORY: No hemoptysis or shortness of breath  CARDIOVASCULAR: No chest pain or palpitations  GASTROINTESTINAL: No melena or hematochezia  GENITOURINARY: No dysuria or hematuria  NEUROLOGICAL: +As stated in HPI above  SKIN: No itching, burning, rashes, or lesions   All other review of systems is negative unless indicated above.    PMHx:  Cryptogenic Organizing Pneumonia  Iron Deficiency Anemia  PNA (pneumonia)    Medications  Home Medications:    MEDICATIONS  (STANDING):  dextrose 5% + sodium chloride 0.9%. 1000 milliLiter(s) (150 mL/Hr) IV Continuous <Continuous>  dextrose 50% Injectable 100 milliLiter(s) IV Push Once  dextrose Oral Gel 15 Gram(s) Oral once  dextrose Oral Gel 15 Gram(s) Oral once  dextrose Oral Gel 15 Gram(s) Oral once    MEDICATIONS  (PRN):      Exam:  Vitals:  T(C): 36.9 (04-25-25 @ 12:22), Max: 36.9 (04-25-25 @ 12:22)  T(F): 98.4 (04-25-25 @ 12:22), Max: 98.4 (04-25-25 @ 12:22)  HR: 65 (04-25-25 @ 12:22) (65 - 65)  BP: 164/91 (04-25-25 @ 12:22) (164/91 - 164/91)  RR: 18 (04-25-25 @ 12:22) (18 - 18)  SpO2: 97% (04-25-25 @ 12:22) (97% - 97%)  I&O's Summary    Physical and Neurological Examination:   - General:  When awake, nontoxic  occasional locking of jaw during which she looks uncomfortable, some arching of back, follows commands  frequent LoC usually resolving within 1-2 minutes, stable vitals  fleshy protuberance / nodule on ceiling of mouth     - Mental Status:   level of consciousness: Awake, alert  Orientation: Oriented to person, age, place, and time  Language: Speech is fluent with intact naming, repetition, and ability to follow commands (including simple commands and complex cross commands)  Cortical Signs: No extinction to visual or tactile DSS, no hemineglect.   Content: Good overall fund of knowledge (aware of current events,and relevant past history)    - Cranial Nerves:   PERRL, VFF, EOMI, facial sensation is intact in the V1-V3 distribution bilaterally; face is symmetric with coco smile; hearing is intact to finger rub bilaterally and equally; uvula is midline and soft palate rises symmetrically; shoulder shrug intact; tongue protrudes in the midline with intact lateral movements    - Motor Testing:  Bulk: Normal   Tone: Normal  Strength:  There is no pronator drift b/l  There is no leg drift b/l though during right leg raise she develops painful cramp    - Reflexes: 2+ b/l Bi/Tri/Br/Pat. 2+ right ankle. Absent left ankle jerk  - Sensory: Intact throughout to light touch.   - Coordination: Finger-nose-finger intact without dysmetria.    - Gait: not tested due to recurrent frequent LoC  Objective Studies  Labs:             14.3   7.22  )-----------( 214      ( 25 Apr 2025 13:50 )             45.4   138  |  103  |  11  ----------------------------<  94  4.5   |  22  |  0.79  Ca    10.2      25 Apr 2025 13:50  TPro  8.4[H]  /  Alb  4.4  /  TBili  0.2  /  DBili  x   /  AST  28  /  ALT  29  /  AlkPhos  87  04-25  PT/INR - ( 25 Apr 2025 13:50 )   PT: 10.2 sec;   INR: <0.90 ratio    PTT - ( 25 Apr 2025 13:50 )  PTT:33.3 sec  Lactate Trend  POCT Blood Glucose.: 246 mg/dL (25 Apr 2025 14:42)  PT/INR - ( 25 Apr 2025 13:50 )   PT: 10.2 sec;   INR: <0.90 ratio    PTT - ( 25 Apr 2025 13:50 )  PTT:33.3 sec    ~~~~  CT Head  FINDINGS:  VENTRICLES AND SULCI: Normal in size and configuration.  INTRA-AXIAL: No mass effect, acute hemorrhage, or midline shift.  There   are periventricular and subcortical white matter hypodensities,   consistent with microvascular type changes.  EXTRA-AXIAL: No mass or fluid collection. Basal cisterns are normal in   appearance.  VISUALIZED SINUSES:  Clear.  TYMPANOMASTOID CAVITIES:  Clear.  VISUALIZED ORBITS: Normal.  CALVARIUM: Intact.  MISCELLANEOUS: None.  IMPRESSION:  No evidence of acute intracranial pathology. If clinical symptoms persist   or worsen, more sensitive evaluation with brain MRI may be obtained, if   no contraindications exist.    CTA/CTP    FINDINGS:    CT PERFUSION:    TECHNICAL LIMITATIONS: None.    CBF<30%/CORE INFARCTION: 0 mL  TMAX>6s/UNDERPERFUSED TISSUE: 0 mL  MISMATCH VOLUME/TISSUE AT RISK: 0 mL  MISMATCH RATIO: None.    MISCELLANEOUS: None.      CTA NECK:    AORTIC ARCH AND VISUALIZED GREAT VESSELS: Within normal limits.    RIGHT:  COMMON CAROTID ARTERY: No significant stenosis to the carotid bifurcation.  INTERNAL CAROTID ARTERY: Atherosclerosis of the proximal internal carotid   artery contributing to mild luminal stenosis.  VERTEBRAL ARTERY: Normal in course and caliber to the intracranial   circulation.    LEFT:  COMMON CAROTID ARTERY: No significant stenosis to the carotid bifurcation.  INTERNAL CAROTID ARTERY: No significant stenosis based on NASCET criteria.  VERTEBRAL ARTERY: Severe short segment stenosis of the V1 segment of the   left vertebral artery. Remainder of the cervical vertebral arteries   patent.    VISUALIZED LUNGS: Bilateral groundglass airspace opacities.    MISCELLANEOUS: None.    CAROTID STENOSIS REFERENCE: Percent (%) stenosis is expressed in terms of   NASCET Criteria. (NASCET = 100x1-(N/D)). N=greatest narrowing. D=normal   distal diameter - MILD = <50% stenosis. - MODERATE = 50-69% stenosis. -   SEVERE = 70-89% stenosis. - HAIRLINE/CRITICAL = 90-99% stenosis. -   OCCLUDED = 100% stenosis.      CTA HEAD:    INTERNAL CAROTID ARTERIES: Bilateral petrous, precavernous, cavernous,   and supraclinoid regions are patent without significant stenosis.    [place]: No aneurysm identified. Tiny aneurysms can be beyond   the resolution of CTA technique.    ANTERIOR CEREBRAL ARTERIES: No significant stenosis or occlusion.  MIDDLE CEREBRAL ARTERIES: No significant stenosis or occlusion.  POSTERIOR CEREBRAL ARTERIES: No significant stenosis or occlusion.    DISTAL VERTEBRAL / BASILAR ARTERIES: No significant stenosis or occlusion.    VENOUS STRUCTURES: Visualized superficial and deep venous structures   appear patent.    MISCELLANEOUS: No other vascular abnormality is seen.    CT SOFT TISSUE NECK:    AERODIGESTIVE TRACT:  Nasopharynx, oropharynx and hypopharynx are normal   in appearance. Larynx and visualized trachea are normal. Visualized   esophagus is normal,  LYMPH NODES:  No adenopathy.  PAROTID GLANDS:  Normal.  SUBMANDIBULAR GLANDS:  Normal.  THYROID GLAND:  Normal.  VISUALIZED SINUSES:  Clear.  VISUALIZED TYMPANOMASTOID CAVITIES: Clear.      IMPRESSION:    CT PERFUSION:  Technical limitations: None.    Core infarction: 0 ml  Penumbra / tissue at risk for active ischemia: 0 ml    CTA NECK:  Short segment severe stenosis of the V1 segment of the left vertebral   artery. Otherwise, no evidence of significant stenosis or occlusion.    CTA HEAD:  No large vessel occlusion, significant stenosis or vascular abnormality   identified.    CT SOFT TISSUE NECK:  No cervical mass or adenopathy identified.

## 2025-04-25 NOTE — ED ADULT TRIAGE NOTE - CHIEF COMPLAINT QUOTE
pt was having blood drawn and passed out multiple times prior to hospital and once at triage timeDr Lawrence for Code StrOke pt was having blood drawn and passed out multiple times prior to hospital and once at triage time Dr Bravo NoT a CodE  Code StrOke

## 2025-04-25 NOTE — ED ADULT NURSE NOTE - CHIEF COMPLAINT QUOTE
pt was having blood drawn and passed out multiple times prior to hospital and once at triage time Dr Bravo NoT a CodE  Code StrOke

## 2025-04-25 NOTE — ED PROVIDER NOTE - OBJECTIVE STATEMENT
69-year-old female patient brought in by daughter past medical history COPD (currently on prednisone for 1 month prescribed by her pulmonologist) presents to ED after syncopal episode while drawing blood at pulmonologist office today.  Patient states she normally passes out during blood draws. Pt denies fevers, no chills, no sob, no chest pain, no gi sxs, no urinary sxs.

## 2025-04-25 NOTE — ED ADULT NURSE NOTE - NSFALLRISKINTERV_ED_ALL_ED
Assistance OOB with selected safe patient handling equipment if applicable/Communicate fall risk and risk factors to all staff, patient, and family/Monitor for mental status changes and reorient to person, place, and time, as needed/Move patient closer to nursing station/within visual sight of ED staff/Orthostatic vital signs/Provide visual cue: yellow wristband, yellow gown, etc/Reinforce activity limits and safety measures with patient and family/Toileting schedule using arm’s reach rule for commode and bathroom/Use of alarms - bed, stretcher, chair and/or video monitoring/Call bell, personal items and telephone in reach/Instruct patient to call for assistance before getting out of bed/chair/stretcher/Non-slip footwear applied when patient is off stretcher/[place] to call system/Physically safe environment - no spills, clutter or unnecessary equipment/Purposeful Proactive Rounding/Room/bathroom lighting operational, light cord in reach

## 2025-04-25 NOTE — H&P ADULT - NSHPPHYSICALEXAM_GEN_ALL_CORE
PHYSICAL EXAM:  GENERAL: NAD, comfortable at bedside   HEAD:  Atraumatic, Normocephalic  EYES: EOMI, PERRL, conjunctiva and sclera clear  NECK: Supple, No JVD  CHEST/LUNG: Clear to auscultation bilaterally; No wheezes, rales or rhonchi  HEART: Regular rate and rhythm; No murmurs, rubs, or gallops, (+)S1, S2  ABDOMEN: Soft, Nontender, Nondistended; Normal Bowel sounds   EXTREMITIES:  2+ Peripheral Pulses, No clubbing, cyanosis, or edema  PSYCH: normal mood and affect  NEUROLOGY: AAOx3, CN 2-12 grossly intact, 5/5 strength in bilateral upper and lower extremities, no slurred speech/facial droop/nystagmus/pronator drift noted. gait not assessed  SKIN: No rashes or lesions PHYSICAL EXAM:  GENERAL: NAD, comfortable at bedside   HEAD:  Atraumatic, Normocephalic  EYES: EOMI, PERRL, conjunctiva and sclera clear  NECK: Supple, No JVD  CHEST/LUNG: Clear to auscultation bilaterally; No wheezes, rales or rhonchi  HEART: Regular rate and rhythm; No murmurs, rubs, or gallops, (+)S1, S2  ABDOMEN: Soft, Nontender, Nondistended; Normal Bowel sounds   EXTREMITIES:  2+ Peripheral Pulses, No clubbing, cyanosis, or edema, tenderness to right calf  PSYCH: normal mood and affect  NEUROLOGY: AAOx3, CN 2-12 grossly intact, 5/5 strength in bilateral upper and lower extremities, no slurred speech/facial droop/nystagmus/pronator drift noted. gait not assessed  SKIN: No rashes or lesions

## 2025-04-25 NOTE — ED PROVIDER NOTE - PROGRESS NOTE DETAILS
lum do pgy-2: While talking to the pt on initial assessment, pt syncopized and went stupurous multiple times, with stable vitals, HD stable, NSR and saturating well on RA. Pt was able to be arousable with sternal rub and would eventually regain consciousness. FS 90s and gave oral glucose with immediate response and orientation X3. While at CT, pt had multiple syncopal episodes with jaw pain that caused her unable to speak. Code stroke called in CT due to difficulty speaking. With massage to TMJ area,  her muscle spasm/jaw pain improved and pt was able to speak again. Pt had more syncopal episodes when back in room, MICU consulted and recommends official echo and IV LR x2 for collapsed IVC seen at bedside pocus. MICU also recommends admission to medicine as pt is stable, maintaining airway during her syncopal/stuporous episodes. Low concern for seizures.

## 2025-04-25 NOTE — H&P ADULT - NSHPLABSRESULTS_GEN_ALL_CORE
13.6   6.70  )-----------( 215      ( 2025 14:50 )             44.3     136  |  100  |  11  ----------------------------<  236[H]       4.0   |  26  |  0.86    Ca    10.3      2025 14:50  Phos  2.8       Mg     2.20         TPro  7.6  /  Alb  4.1  /  TBili  0.4  /  DBili  x   /  AST  16  /  ALT  25  /  AlkPhos  83      PT/INR: 10.8/0.93 (25 @ 14:50)  PTT: 29.5 (25 @ 14:50)  PT/INR: 10.2/<0.90 (25 @ 13:50)  PTT: 33.3 (25 @ 13:50)      13:50 - VBG - pH: 7.35  | pCO2: 47    | pO2: 56    | Lactate: 2.2            hs Troponin, T - <6 ng/L (25 @ 14:50)  hs Troponin, T - <6 ng/L (25 @ 13:50)              Urinalysis Basic - ( 2025 15:54 )  Color: Yellow / Appearance: Clear / S.007 / pH: 7.5  Gluc: 500 mg/dL / Ketone: Negative mg/dL  / Bili: Negative / Urobili: 0.2 mg/dL   Blood: Negative / Protein: Negative mg/dL / Nitrite: Negative   Leuk Esterase: Negative / RBC: x / WBC x   Sq Epi: x / Bacteria: x  Hyaline Casts: x/WBC Casts: x          Urinalysis with Rflx Culture (collected 2025 15:54)    EKG interpreted by myself: nonischemic 13.6   6.70  )-----------( 215      ( 2025 14:50 )             44.3     136  |  100  |  11  ----------------------------<  236[H]       4.0   |  26  |  0.86    Ca    10.3      2025 14:50  Phos  2.8       Mg     2.20         TPro  7.6  /  Alb  4.1  /  TBili  0.4  /  DBili  x   /  AST  16  /  ALT  25  /  AlkPhos  83      PT/INR: 10.8/0.93 (25 @ 14:50)  PTT: 29.5 (25 @ 14:50)  PT/INR: 10.2/<0.90 (25 @ 13:50)  PTT: 33.3 (25 @ 13:50)      13:50 - VBG - pH: 7.35  | pCO2: 47    | pO2: 56    | Lactate: 2.2            hs Troponin, T - <6 ng/L (25 @ 14:50)  hs Troponin, T - <6 ng/L (25 @ 13:50)              Urinalysis Basic - ( 2025 15:54 )  Color: Yellow / Appearance: Clear / S.007 / pH: 7.5  Gluc: 500 mg/dL / Ketone: Negative mg/dL  / Bili: Negative / Urobili: 0.2 mg/dL   Blood: Negative / Protein: Negative mg/dL / Nitrite: Negative   Leuk Esterase: Negative / RBC: x / WBC x   Sq Epi: x / Bacteria: x  Hyaline Casts: x/WBC Casts: x          Urinalysis with Rflx Culture (collected 2025 15:54)    EKG interpreted by myself: nonischemic    images interpreted by radiology:     CT PERFUSION:  Technical limitations: None.  Core infarction: 0 ml  Penumbra / tissue at risk for active ischemia: 0 ml    CT Chest: Redemonstration of fibrotic interstitial disease; overall this is stable appearing from a 2025 chest CT, and thought to be secondary to fibrosis related to prior organizing pneumonia. Slight interval decreased groundglass opacities of the left lower lobe, otherwise stable CT exam from 2025.    CT Abdomen/pelvis: Mild right hydronephrosis, otherwise no acute abnormality.    CTA NECK: Short segment severe stenosis of the V1 segment of the left vertebral artery. Otherwise, no evidence of significant stenosis or occlusion.    CTA HEAD: No large vessel occlusion, significant stenosis or vascular abnormality identified.    CT SOFT TISSUE NECK: No cervical mass or adenopathy identified.

## 2025-04-25 NOTE — CONSULT NOTE ADULT - ASSESSMENT
ALL RECOMMENDATIONS PRELIMINARY PENDING ATTENDING ATTESTATION    68yo female with pmh cryptogenic organizing pneumonia, lynn, sent in from pulmonologists office for syncopal episode during routine bloodwork; Outpatient pulmonologist stated patient appeared dehydrated and recommend she be evaluated in ED for dehydration and cardiac cause. In ED patient had numerous episodes of syncope. During one episode, FSG noted to be 91. MICU consulted for q1h FSG iso c/f insulin overdose. Patient continued to have episodes of syncope despite normal/elevated FSG. POCUS showing signs of dehydration and possible dynamic LVOT obstruction.      RECOMMENDATIONS:  -Recommend 2L NS bolus  -Recommend expedited full TTE  -Not a MICU candidate at this time

## 2025-04-25 NOTE — H&P ADULT - HISTORY OF PRESENT ILLNESS
69 yr old female with a pmh of cryptogenic organizing pneumonia on steroids (pred 10 daily x 1 month), JASEN, prior syncope (when having blood drawn ~1yr ago) who presents with a syncopal episode while at her Pulmonologist office having blood drawn. Pt reports she felt lightheaded and generally weak prior to having LOC for a few seconds. She had multiple subsequent events in the ED where she recalls some of the events. While in imaging she reports she had another syncopal episode and when she came to she felt like her "jaw was tense and stuck and couldn't talk". These symptoms resolved with massaging of her TMJ bilaterally.   Pt also endorses leg cramping that has been going on for the past several months more so on the right lower leg.   Denies  headache, dizziness, chest pain, palpitations, new SOB, abdominal pain, joint pain, diarrhea/constipation, urinary symptoms.   Vitals: T 98.4, HR 83, /71, RR 17 satting 98% RA 69 yr old female with a pmh of cryptogenic organizing pneumonia on steroids (pred 10 daily x 1 month), JASEN, prior syncope (when having blood drawn ~1yr ago) who presents with a syncopal episode while at her Pulmonologist office having blood drawn. Pt reports she felt lightheaded and generally weak prior to having LOC for a few seconds. She had multiple subsequent events in the ED where she recalls some of the events. While in imaging she reports she had another syncopal episode and when she came to she felt like her "jaw was tense and stuck and couldn't talk". These symptoms resolved with massaging of her TMJ bilaterally.   Pt also endorses leg cramping that has been going on for the past several months more so on the right lower leg.   In the ED she was given glucose with a BG of 97. No hypoglycemia events reported  Denies  headache, dizziness, chest pain, palpitations, new SOB, abdominal pain, joint pain, diarrhea/constipation, urinary symptoms.   Vitals: T 98.4, HR 83, /71, RR 17 satting 98% RA    Per Pulmonology note: :"Patient had a brief vasovagal event while drawing blood in sitting position. She was stabilized, alert and awake a few minutes later. BP was 190/90.She had 1 cup of apple juice. We then transferred her to supine position. She had more apple juice. She felt better and we tried to draw blood again, but were unable to get any blood. It appears pt is dehydrated. After needle was withdrawn, pt fainted again in supine position.SpO2 98%, /93, HR fluctuating between 59-79bpm" 69 yr old female with a pmh of cryptogenic organizing pneumonia on steroids (pred 10 daily x 1 month), JASEN, prior syncope (when having blood drawn ~1yr ago) who presents with a syncopal episode while at her Pulmonologist office having blood drawn. Pt reports she felt lightheaded and generally weak prior to having LOC for a few seconds. She had multiple subsequent events in the ED where she recalls some of the events. While in imaging she reports she had another syncopal episode and when she came to she felt like her "jaw was tense and stuck and couldn't talk". These symptoms resolved with massaging of her TMJ bilaterally.   Pt also endorses leg cramping that has been going on for the past several months more so on the right lower leg.   In the ED she was given glucose with a BG of 97. No hypoglycemia events reported  Denies  headache, dizziness, chest pain, palpitations, new SOB, abdominal pain, joint pain, diarrhea/constipation, urinary symptoms.   Vitals: T 98.4, HR 83, /71, RR 17 satting 98% RA    Per Pulmonology note: :"Patient had a brief vasovagal event while drawing blood in sitting position. She was stabilized, alert and awake a few minutes later. BP was 190/90.She had 1 cup of apple juice. We then transferred her to supine position. She had more apple juice. She felt better and we tried to draw blood again, but were unable to get any blood. It appears pt is dehydrated. After needle was withdrawn, pt fainted again in supine position.SpO2 98%, /93, HR fluctuating between 59-79bpm"

## 2025-04-25 NOTE — H&P ADULT - PROBLEM SELECTOR PLAN 1
New  hx as above   Imaging as above, UA/Utox negative  No prior TTE on file   EKG: nonischemic , troponin <6  TTE ordered, trend trop, monitor on tele  MICU consulted: "Appears hypovolemic on POCUS and she has been purposefully watching what she eats while on prednisone. Would give IVF bolus now. check formal TTE to r/o dynamic LVOT obstruction now get LE duplex. continue with prednisone 30 mg po daily for cryptogenic organizing pneumonia. admit to tele. "  Neurology consulted: "impression is possibly electrolyte disturbance in setting of steroids. monitor for recurrent events  no need for EEG or MRI unless events persist. check Ica, CK, PTH ."  Hypoglycemia workup ordered in ED- pending

## 2025-04-25 NOTE — CONSULT NOTE ADULT - SUBJECTIVE AND OBJECTIVE BOX
CHIEF COMPLAINT:    HPI:  70yo female with pmh cryptogenic organizing pneumonia, lynn, sent in from pulmonologists office for syncopal episode during routine bloodwork; Outpatient    PAST MEDICAL & SURGICAL HISTORY:  Cryptogenic Organizing Pneumonia      Iron Deficiency Anemia      PNA (pneumonia)      No significant past surgical history          Advance Directives:    Allergies    No Known Allergies    Intolerances        HOME MEDICATIONS:    REVIEW OF SYSTEMS:  [ ] All other systems negative  [ ] Unable to assess ROS because ________    OBJECTIVE:  ICU Vital Signs Last 24 Hrs  T(C): 36.9 (2025 12:22), Max: 36.9 (2025 12:22)  T(F): 98.4 (2025 12:22), Max: 98.4 (2025 12:22)  HR: 65 (2025 12:22) (65 - 65)  BP: 164/91 (2025 12:22) (164/91 - 164/91)  BP(mean): --  ABP: --  ABP(mean): --  RR: 18 (2025 12:22) (18 - 18)  SpO2: 97% (2025 12:22) (97% - 97%)          CAPILLARY BLOOD GLUCOSE      POCT Blood Glucose.: 218 mg/dL (2025 15:27)      PHYSICAL EXAM:  General: Well-groomed, NAD, laying in bed, on ___O2  HEENT: PERRLA, EOMI, non-icteric  Neck:  symmetric,  JVD absent  Respiratory: Clear to ascultation bilaterally, no crackles/rales, no Resp distress; no accessory muscle use  Cardiovascular:  RRR, no murmurs/rubs/gallops  Abdomen: Soft, NT, ND  Extremities: No edema noted  Skin: No rashes or lesions noted  Neurological: Sensation grossly intact; strength 5/5 in all extremities.  Psychiatry: AOx3, appropriate insight/judgement, appropriate affect, recent/remote memory intact    HOSPITAL MEDICATIONS:  MEDICATIONS  (STANDING):  dextrose 5% + sodium chloride 0.9%. 1000 milliLiter(s) (150 mL/Hr) IV Continuous <Continuous>  dextrose 50% Injectable 100 milliLiter(s) IV Push Once  dextrose Oral Gel 15 Gram(s) Oral once  dextrose Oral Gel 15 Gram(s) Oral once  dextrose Oral Gel 15 Gram(s) Oral once  lactated ringers Bolus 1000 milliLiter(s) IV Bolus once    MEDICATIONS  (PRN):      LABS:                        13.6   6.70  )-----------( 215      ( 2025 14:50 )             44.3         136  |  100  |  11  ----------------------------<  236[H]  4.0   |  26  |  0.86    Ca    10.3      2025 14:50  Phos  2.8       Mg     2.20         TPro  7.6  /  Alb  4.1  /  TBili  0.4  /  DBili  x   /  AST  16  /  ALT  25  /  AlkPhos  83      PT/INR - ( 2025 14:50 )   PT: 10.8 sec;   INR: 0.93 ratio         PTT - ( 2025 14:50 )  PTT:29.5 sec  Urinalysis Basic - ( 2025 15:54 )    Color: Yellow / Appearance: Clear / S.007 / pH: x  Gluc: x / Ketone: Negative mg/dL  / Bili: Negative / Urobili: 0.2 mg/dL   Blood: x / Protein: Negative mg/dL / Nitrite: Negative   Leuk Esterase: Negative / RBC: x / WBC x   Sq Epi: x / Non Sq Epi: x / Bacteria: x        Venous Blood Gas:   @ 13:50  7.35/47/56/26/87.3  VBG Lactate: 2.2      MICROBIOLOGY:     RADIOLOGY:  [ ] Reviewed and interpreted by me    EKG: CHIEF COMPLAINT:    HPI:  70yo female with pmh cryptogenic organizing pneumonia, lynn, sent in from pulmonologists office for syncopal episode during routine bloodwork; Outpatient pulmonologist stated patient appeared dehydrated and recommend she be evaluated in ED for dehydration and cardiac cause. In ED patient had numerous episodes of syncope. During one episode, FSG noted to be 91. MICU consulted for q1h FSG iso c/f insulin overdose. Patient subsequently had repeat syncopal episode, during which time FSG was 218.    On exam patient NAD AOX4. Bedside POCUS showing collapsed IVC, thickened LV, potential HILLARY of MV. Patient had episode during eval in which she became stuporous for several minutes. POCUS unremarkable during this episode.    PAST MEDICAL & SURGICAL HISTORY:  Cryptogenic Organizing Pneumonia      Iron Deficiency Anemia      PNA (pneumonia)      No significant past surgical history          Advance Directives:    Allergies    No Known Allergies    Intolerances        HOME MEDICATIONS:      OBJECTIVE:  ICU Vital Signs Last 24 Hrs  T(C): 36.9 (2025 12:22), Max: 36.9 (2025 12:22)  T(F): 98.4 (2025 12:22), Max: 98.4 (2025 12:22)  HR: 65 (2025 12:22) (65 - 65)  BP: 164/91 (2025 12:22) (164/91 - 164/91)  BP(mean): --  ABP: --  ABP(mean): --  RR: 18 (2025 12:22) (18 - 18)  SpO2: 97% (2025 12:22) (97% - 97%)          CAPILLARY BLOOD GLUCOSE      POCT Blood Glucose.: 218 mg/dL (2025 15:27)      PHYSICAL EXAM:  General: Well-groomed, NAD, laying in bed, on RA  HEENT:non-icteric  Neck:  symmetric,  Respiratory: Clear to ascultation bilaterally,   Cardiovascular:  RRR,   Neurological: Sensation grossly intact  Psychiatry: AOx3, appropriate insight/judgement, appropriate affect, recent/remote memory intact    HOSPITAL MEDICATIONS:  MEDICATIONS  (STANDING):  dextrose 5% + sodium chloride 0.9%. 1000 milliLiter(s) (150 mL/Hr) IV Continuous <Continuous>  dextrose 50% Injectable 100 milliLiter(s) IV Push Once  dextrose Oral Gel 15 Gram(s) Oral once  dextrose Oral Gel 15 Gram(s) Oral once  dextrose Oral Gel 15 Gram(s) Oral once  lactated ringers Bolus 1000 milliLiter(s) IV Bolus once    MEDICATIONS  (PRN):      LABS:                        13.6   6.70  )-----------( 215      ( 2025 14:50 )             44.3         136  |  100  |  11  ----------------------------<  236[H]  4.0   |  26  |  0.86    Ca    10.3      2025 14:50  Phos  2.8     -  Mg     2.20         TPro  7.6  /  Alb  4.1  /  TBili  0.4  /  DBili  x   /  AST  16  /  ALT  25  /  AlkPhos  83  -25    PT/INR - ( 2025 14:50 )   PT: 10.8 sec;   INR: 0.93 ratio         PTT - ( 2025 14:50 )  PTT:29.5 sec  Urinalysis Basic - ( 2025 15:54 )    Color: Yellow / Appearance: Clear / S.007 / pH: x  Gluc: x / Ketone: Negative mg/dL  / Bili: Negative / Urobili: 0.2 mg/dL   Blood: x / Protein: Negative mg/dL / Nitrite: Negative   Leuk Esterase: Negative / RBC: x / WBC x   Sq Epi: x / Non Sq Epi: x / Bacteria: x        Venous Blood Gas:   @ 13:50  7.35/47/56/26/87.3  VBG Lactate: 2.2      MICROBIOLOGY:     RADIOLOGY:  [ ] Reviewed and interpreted by me    EKG:

## 2025-04-25 NOTE — H&P ADULT - REASON FOR ADMISSION
Returned patient call and was informed of below. Patient states that it was recommended to her by her dermatologist but she never tested positive nor did she have any recent exposure. She is not interested in donating at this time. She appreciates the return call and verbalized understanding.   syncope

## 2025-04-26 LAB
A1C WITH ESTIMATED AVERAGE GLUCOSE RESULT: 5.5 % — SIGNIFICANT CHANGE UP (ref 4–5.6)
ADD ON TEST-SPECIMEN IN LAB: SIGNIFICANT CHANGE UP
ANION GAP SERPL CALC-SCNC: 12 MMOL/L — SIGNIFICANT CHANGE UP (ref 7–14)
ANISOCYTOSIS BLD QL: SLIGHT — SIGNIFICANT CHANGE UP
BASOPHILS # BLD AUTO: 0 K/UL — SIGNIFICANT CHANGE UP (ref 0–0.2)
BASOPHILS NFR BLD AUTO: 0 % — SIGNIFICANT CHANGE UP (ref 0–2)
BUN SERPL-MCNC: 7 MG/DL — SIGNIFICANT CHANGE UP (ref 7–23)
CA-I BLD-SCNC: 1.26 MMOL/L — SIGNIFICANT CHANGE UP (ref 1.15–1.29)
CALCIUM SERPL-MCNC: 9.2 MG/DL — SIGNIFICANT CHANGE UP (ref 8.4–10.5)
CALCIUM SERPL-MCNC: 9.2 MG/DL — SIGNIFICANT CHANGE UP (ref 8.4–10.5)
CHLORIDE SERPL-SCNC: 106 MMOL/L — SIGNIFICANT CHANGE UP (ref 98–107)
CHOLEST SERPL-MCNC: 218 MG/DL — HIGH
CK SERPL-CCNC: 82 U/L — SIGNIFICANT CHANGE UP (ref 25–170)
CO2 SERPL-SCNC: 25 MMOL/L — SIGNIFICANT CHANGE UP (ref 22–31)
CREAT SERPL-MCNC: 0.87 MG/DL — SIGNIFICANT CHANGE UP (ref 0.5–1.3)
EGFR: 73 ML/MIN/1.73M2 — SIGNIFICANT CHANGE UP
EGFR: 73 ML/MIN/1.73M2 — SIGNIFICANT CHANGE UP
EOSINOPHIL # BLD AUTO: 0.72 K/UL — HIGH (ref 0–0.5)
EOSINOPHIL NFR BLD AUTO: 7 % — HIGH (ref 0–6)
ESTIMATED AVERAGE GLUCOSE: 111 — SIGNIFICANT CHANGE UP
GIANT PLATELETS BLD QL SMEAR: PRESENT — SIGNIFICANT CHANGE UP
GLUCOSE BLDC GLUCOMTR-MCNC: 105 MG/DL — HIGH (ref 70–99)
GLUCOSE BLDC GLUCOMTR-MCNC: 121 MG/DL — HIGH (ref 70–99)
GLUCOSE BLDC GLUCOMTR-MCNC: 79 MG/DL — SIGNIFICANT CHANGE UP (ref 70–99)
GLUCOSE BLDC GLUCOMTR-MCNC: 88 MG/DL — SIGNIFICANT CHANGE UP (ref 70–99)
GLUCOSE SERPL-MCNC: 81 MG/DL — SIGNIFICANT CHANGE UP (ref 70–99)
HCT VFR BLD CALC: 43.7 % — SIGNIFICANT CHANGE UP (ref 34.5–45)
HDLC SERPL-MCNC: 75 MG/DL — SIGNIFICANT CHANGE UP
HGB BLD-MCNC: 13.6 G/DL — SIGNIFICANT CHANGE UP (ref 11.5–15.5)
IANC: 3.42 K/UL — SIGNIFICANT CHANGE UP (ref 1.8–7.4)
INSULIN SERPL-MCNC: 325 UU/ML — HIGH (ref 2.6–24.9)
LDLC SERPL-MCNC: 129 MG/DL — HIGH
LIPID PNL WITH DIRECT LDL SERPL: 129 MG/DL — HIGH
LYMPHOCYTES # BLD AUTO: 4.89 K/UL — HIGH (ref 1–3.3)
LYMPHOCYTES # BLD AUTO: 47.4 % — HIGH (ref 13–44)
MAGNESIUM SERPL-MCNC: 1.8 MG/DL — SIGNIFICANT CHANGE UP (ref 1.6–2.6)
MANUAL SMEAR VERIFICATION: SIGNIFICANT CHANGE UP
MCHC RBC-ENTMCNC: 24.3 PG — LOW (ref 27–34)
MCHC RBC-ENTMCNC: 31.1 G/DL — LOW (ref 32–36)
MCV RBC AUTO: 78.2 FL — LOW (ref 80–100)
MONOCYTES # BLD AUTO: 0.09 K/UL — SIGNIFICANT CHANGE UP (ref 0–0.9)
MONOCYTES NFR BLD AUTO: 0.9 % — LOW (ref 2–14)
NEUTROPHILS # BLD AUTO: 3.7 K/UL — SIGNIFICANT CHANGE UP (ref 1.8–7.4)
NEUTROPHILS NFR BLD AUTO: 35.9 % — LOW (ref 43–77)
NONHDLC SERPL-MCNC: 143 MG/DL — HIGH
NRBC # BLD: 2 /100 WBCS — HIGH (ref 0–0)
NRBC BLD-RTO: 2 /100 WBCS — HIGH (ref 0–0)
PHOSPHATE SERPL-MCNC: 3 MG/DL — SIGNIFICANT CHANGE UP (ref 2.5–4.5)
PLAT MORPH BLD: NORMAL — SIGNIFICANT CHANGE UP
PLATELET # BLD AUTO: 218 K/UL — SIGNIFICANT CHANGE UP (ref 150–400)
PLATELET COUNT - ESTIMATE: NORMAL — SIGNIFICANT CHANGE UP
POLYCHROMASIA BLD QL SMEAR: SLIGHT — SIGNIFICANT CHANGE UP
POTASSIUM SERPL-MCNC: 3.7 MMOL/L — SIGNIFICANT CHANGE UP (ref 3.5–5.3)
POTASSIUM SERPL-SCNC: 3.7 MMOL/L — SIGNIFICANT CHANGE UP (ref 3.5–5.3)
PTH-INTACT FLD-MCNC: 51 PG/ML — SIGNIFICANT CHANGE UP (ref 15–65)
RBC # BLD: 5.59 M/UL — HIGH (ref 3.8–5.2)
RBC # FLD: 14.7 % — HIGH (ref 10.3–14.5)
RBC BLD AUTO: NORMAL — SIGNIFICANT CHANGE UP
SMUDGE CELLS # BLD: PRESENT — SIGNIFICANT CHANGE UP
SODIUM SERPL-SCNC: 143 MMOL/L — SIGNIFICANT CHANGE UP (ref 135–145)
TRIGL SERPL-MCNC: 79 MG/DL — SIGNIFICANT CHANGE UP
TROPONIN T, HIGH SENSITIVITY RESULT: 7 NG/L — SIGNIFICANT CHANGE UP
VARIANT LYMPHS # BLD: 8.8 % — HIGH (ref 0–6)
VARIANT LYMPHS NFR BLD MANUAL: 8.8 % — HIGH (ref 0–6)
VIT D25+D1,25 OH+D1,25 PNL SERPL-MCNC: 44.9 PG/ML — SIGNIFICANT CHANGE UP (ref 19.9–79.3)
WBC # BLD: 10.32 K/UL — SIGNIFICANT CHANGE UP (ref 3.8–10.5)
WBC # FLD AUTO: 10.32 K/UL — SIGNIFICANT CHANGE UP (ref 3.8–10.5)

## 2025-04-26 PROCEDURE — 99232 SBSQ HOSP IP/OBS MODERATE 35: CPT

## 2025-04-26 RX ORDER — SODIUM CHLORIDE 9 G/1000ML
1000 INJECTION, SOLUTION INTRAVENOUS
Refills: 0 | Status: DISCONTINUED | OUTPATIENT
Start: 2025-04-26 | End: 2025-05-01

## 2025-04-26 RX ORDER — INFLUENZA A VIRUS A/IDAHO/07/2018 (H1N1) ANTIGEN (MDCK CELL DERIVED, PROPIOLACTONE INACTIVATED, INFLUENZA A VIRUS A/INDIANA/08/2018 (H3N2) ANTIGEN (MDCK CELL DERIVED, PROPIOLACTONE INACTIVATED), INFLUENZA B VIRUS B/SINGAPORE/INFTT-16-0610/2016 ANTIGEN (MDCK CELL DERIVED, PROPIOLACTONE INACTIVATED), INFLUENZA B VIRUS B/IOWA/06/2017 ANTIGEN (MDCK CELL DERIVED, PROPIOLACTONE INACTIVATED) 15; 15; 15; 15 UG/.5ML; UG/.5ML; UG/.5ML; UG/.5ML
0.5 INJECTION, SUSPENSION INTRAMUSCULAR ONCE
Refills: 0 | Status: DISCONTINUED | OUTPATIENT
Start: 2025-04-26 | End: 2025-05-01

## 2025-04-26 RX ADMIN — SODIUM CHLORIDE 75 MILLILITER(S): 9 INJECTION, SOLUTION INTRAVENOUS at 20:08

## 2025-04-26 RX ADMIN — Medication 650 MILLIGRAM(S): at 13:26

## 2025-04-26 RX ADMIN — PREDNISONE 30 MILLIGRAM(S): 20 TABLET ORAL at 05:49

## 2025-04-26 RX ADMIN — Medication 650 MILLIGRAM(S): at 14:36

## 2025-04-26 RX ADMIN — ENOXAPARIN SODIUM 40 MILLIGRAM(S): 100 INJECTION SUBCUTANEOUS at 18:03

## 2025-04-26 RX ADMIN — SODIUM CHLORIDE 75 MILLILITER(S): 9 INJECTION, SOLUTION INTRAVENOUS at 13:17

## 2025-04-26 RX ADMIN — Medication 40 MILLIGRAM(S): at 09:21

## 2025-04-26 NOTE — ED ADULT NURSE REASSESSMENT NOTE - NS ED NURSE REASSESS COMMENT FT1
RN bedside to find patient having another syncope episode where patient becomes verbally unresponsive and looks extremely fatigue. Patient was found eating with residual food content in mouth during episode. Patient encouraged and able to physically spit residual food content out for RN. This episode lasted only a few seconds and patient now back to baseline, AxO x4, and vitals remain stable. ACP Linda Peña made aware via TEAMs and noted that patient to remain NPO.

## 2025-04-26 NOTE — ED ADULT NURSE REASSESSMENT NOTE - NS ED NURSE REASSESS COMMENT FT1
Break RN. Pt in spot 27. A&O4. respirations even and unlabored. FS in documentation. no acute distress noted. pt admitted, pending bed assignment. call bell within reach. NSR on monitor. comfort and safety maintained.

## 2025-04-26 NOTE — PROGRESS NOTE ADULT - ASSESSMENT
-69year old right handed woman hx cryptogenic organizing pneumonia on chronic steroids, JASEN, prior syncope  -Initially presents to ED for syncope during blood draw at pulm office (there for checkup)  -in ED recurrent syncope as well as lock jaw (limits speech, prompting code stroke). Glucose 92. Given sugar as thought contributing to her syncope. When jaw tightens its stuck open, she cannot talk. Seems to arch her back.  -Vitals in ED stable. /91, normoxic, afebrile. Satting well on RA  -Neuro exam benign between episodes. Occasional LOC and gradual return to normalcy. Occasional tightening of the jaw, stuck in open position- during these no loss of awareness and able to follow commands. Also right leg cramp.   -Labs with microcytosis, normal coags, grossly normal CMP including normal calcium. Lactate 2.2, pH 7.35. pCO2 47  -CTH w/ sensecent changes and microvascular disease.   -CTA/CTP no core/penumbra. 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    Impression:   1)Difficulty speaking a/w ?lockjaw of unclear etiology, appears less likely to be neurologic as not a language issue or dysarthria. Rule out underlying toxic metabolic causes such as electrolyte disturbance. This is not aphasia from brain dysfunction such as stroke.   2) Patient experienced a syncopal episode following a blood draw which appeared consistent with vasovagal syncope. However, she had multiple episodes of pre-syncope/syncope after this and was treated with glucose    Recommendations:  -Check ionized calcium, PTH, PTrH, Vitamin-D-25OH, tetanus antibodies, CPK  -Added on magnesium (she takes supplements but is perhaps on a PPI? shes not sure the name of the med)  -Evaluate patient for TMJ disease  -Added on phosphate  -Tox screen  -EKG/Tele/echo for syncope workup    Patient seen with Dr. Lorenz.   -69year old right handed woman hx cryptogenic organizing pneumonia on chronic steroids, JASEN, prior syncope  -Initially presents to ED for syncope during blood draw at pulm office (there for checkup)  -in ED recurrent syncope as well as lock jaw (limits speech, prompting code stroke). Glucose 92. Given sugar as thought contributing to her syncope. When jaw tightens its stuck open, she cannot talk. Seems to arch her back.  -Vitals in ED stable. /91, normoxic, afebrile. Satting well on RA  -Neuro exam benign between episodes. Occasional LOC and gradual return to normalcy. Occasional tightening of the jaw, stuck in open position- during these no loss of awareness and able to follow commands. Also right leg cramp.   -Labs with microcytosis, normal coags, grossly normal CMP including normal calcium. Lactate 2.2, pH 7.35. pCO2 47  -CTH w/ sensecent changes and microvascular disease.   -CTA/CTP no core/penumbra. 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    Impression:   1)Difficulty speaking a/w ?lockjaw of unclear etiology, appears less likely to be neurologic as not a language issue or dysarthria. Rule out underlying toxic metabolic causes such as electrolyte disturbance. This is not aphasia from brain dysfunction such as stroke.   2) Patient experienced a syncopal episode following a blood draw which appeared consistent with vasovagal syncope. However, she had multiple episodes of pre-syncope/syncope after this and was treated with glucose    Recommendations:  -VEEG  -Check ionized calcium, PTH, PTrH, Vitamin-D-25OH, tetanus antibodies, CPK  -Evaluate patient for TMJ disease  -Added on phosphate  -Tox screen  -EKG/Tele/echo for syncope workup    Patient seen with Dr. Lorenz.

## 2025-04-26 NOTE — ED ADULT NURSE REASSESSMENT NOTE - NS ED NURSE REASSESS COMMENT FT1
Patient found in stretcher breathing spontaneously and unlabored on Room Air. No signs of respiratory distress @ this time. The patient is alert and orientated x4 and responds appropriately. The patient presents with Bilateral Ultrasound upper extremity 20G PIVs that is C/D/I and saline locked @ this time. Pt denies chest pain, palpitations, shortness of breath, headache, visual disturbances, numbness/tingling, fever, chills, diaphoresis,  nausea, vomiting, constipation, diarrhea, or urinary symptoms. Safety and comfort measures provided, bed locked and in lowest position, side rails up for safety. VS to order and Awaiting admit to MEDICINE.  *Note RN bedside to administer PO Protonix. Patient head of bed elevated at this time, and immediately patient starts to have another syncope episode. Upon assessment patient presented immediately drowsy and out of place. Patient initially not responding but then minimally responds and appropriately but after multiple stimulation both verbal & physical. Episode lasted at least 5-7mins with patient progressively and slowly becoming back to baseline. VS stable both before & during episode. ACP Linda Peña made aware and per ACP HOLD transport to admitted bed at this time.

## 2025-04-26 NOTE — ED ADULT NURSE REASSESSMENT NOTE - NS ED NURSE REASSESS COMMENT FT1
RN bedside to find patient having another syncope episode where patient becomes verbally unresponsive and looks extremely fatigue. This episode lasted only a few seconds and patient now back to baseline, AxO x4, and vitals remain stable. ACP Linda Peña made aware via TEAMs and awaiting response.

## 2025-04-26 NOTE — PROGRESS NOTE ADULT - ATTENDING COMMENTS
seen and examined on rounds  chart reviewed    69f  she has cryptogenic organizing PNA  recently started steroids (pred 20 x1w then 10)  syncope today after blood draw but then repeated events in ER of brief LOC without seizure  some she recalls some she doesnt  I watched a few and she was unresponsive for seconds, but with stable vitals  she has long standing right leg cramps  she also has episodes of facial spasms today  k and ca normal     no prior such events    INTERVAL HISTORY:  no events overnight    on exam she's essentially normal between events    impression is possibly electrolyte disturbance in setting of steroids  monitor for recurrent events  if she has further events today reasonable to VEEG but I do not think these events are ictal  do not see a role for MRI  no need for EEG or MRI unless events persist  check Ica, CK, PTH .

## 2025-04-27 LAB
ANION GAP SERPL CALC-SCNC: 9 MMOL/L — SIGNIFICANT CHANGE UP (ref 7–14)
BUN SERPL-MCNC: 9 MG/DL — SIGNIFICANT CHANGE UP (ref 7–23)
CALCIUM SERPL-MCNC: 9.2 MG/DL — SIGNIFICANT CHANGE UP (ref 8.4–10.5)
CALCIUM SERPL-MCNC: 9.3 MG/DL — SIGNIFICANT CHANGE UP (ref 8.4–10.5)
CHLORIDE SERPL-SCNC: 108 MMOL/L — HIGH (ref 98–107)
CO2 SERPL-SCNC: 24 MMOL/L — SIGNIFICANT CHANGE UP (ref 22–31)
CREAT SERPL-MCNC: 0.8 MG/DL — SIGNIFICANT CHANGE UP (ref 0.5–1.3)
CULTURE RESULTS: SIGNIFICANT CHANGE UP
EGFR: 81 ML/MIN/1.73M2 — SIGNIFICANT CHANGE UP
EGFR: 81 ML/MIN/1.73M2 — SIGNIFICANT CHANGE UP
GLUCOSE BLDC GLUCOMTR-MCNC: 107 MG/DL — HIGH (ref 70–99)
GLUCOSE BLDC GLUCOMTR-MCNC: 109 MG/DL — HIGH (ref 70–99)
GLUCOSE BLDC GLUCOMTR-MCNC: 112 MG/DL — HIGH (ref 70–99)
GLUCOSE BLDC GLUCOMTR-MCNC: 94 MG/DL — SIGNIFICANT CHANGE UP (ref 70–99)
GLUCOSE SERPL-MCNC: 120 MG/DL — HIGH (ref 70–99)
HCT VFR BLD CALC: 41.3 % — SIGNIFICANT CHANGE UP (ref 34.5–45)
HGB BLD-MCNC: 13.1 G/DL — SIGNIFICANT CHANGE UP (ref 11.5–15.5)
MAGNESIUM SERPL-MCNC: 2 MG/DL — SIGNIFICANT CHANGE UP (ref 1.6–2.6)
MCHC RBC-ENTMCNC: 24.7 PG — LOW (ref 27–34)
MCHC RBC-ENTMCNC: 31.7 G/DL — LOW (ref 32–36)
MCV RBC AUTO: 77.9 FL — LOW (ref 80–100)
NRBC # BLD AUTO: 0 K/UL — SIGNIFICANT CHANGE UP (ref 0–0)
NRBC # FLD: 0 K/UL — SIGNIFICANT CHANGE UP (ref 0–0)
NRBC BLD AUTO-RTO: 0 /100 WBCS — SIGNIFICANT CHANGE UP (ref 0–0)
PHOSPHATE SERPL-MCNC: 3 MG/DL — SIGNIFICANT CHANGE UP (ref 2.5–4.5)
PLATELET # BLD AUTO: 192 K/UL — SIGNIFICANT CHANGE UP (ref 150–400)
POTASSIUM SERPL-MCNC: 3.9 MMOL/L — SIGNIFICANT CHANGE UP (ref 3.5–5.3)
POTASSIUM SERPL-SCNC: 3.9 MMOL/L — SIGNIFICANT CHANGE UP (ref 3.5–5.3)
PTH-INTACT FLD-MCNC: 49 PG/ML — SIGNIFICANT CHANGE UP (ref 15–65)
RBC # BLD: 5.3 M/UL — HIGH (ref 3.8–5.2)
RBC # FLD: 14.8 % — HIGH (ref 10.3–14.5)
SODIUM SERPL-SCNC: 141 MMOL/L — SIGNIFICANT CHANGE UP (ref 135–145)
SPECIMEN SOURCE: SIGNIFICANT CHANGE UP
WBC # BLD: 5.01 K/UL — SIGNIFICANT CHANGE UP (ref 3.8–10.5)
WBC # FLD AUTO: 5.01 K/UL — SIGNIFICANT CHANGE UP (ref 3.8–10.5)

## 2025-04-27 PROCEDURE — 99231 SBSQ HOSP IP/OBS SF/LOW 25: CPT

## 2025-04-27 PROCEDURE — 99232 SBSQ HOSP IP/OBS MODERATE 35: CPT

## 2025-04-27 PROCEDURE — 95720 EEG PHY/QHP EA INCR W/VEEG: CPT

## 2025-04-27 RX ADMIN — PREDNISONE 30 MILLIGRAM(S): 20 TABLET ORAL at 05:32

## 2025-04-27 RX ADMIN — ENOXAPARIN SODIUM 40 MILLIGRAM(S): 100 INJECTION SUBCUTANEOUS at 17:23

## 2025-04-27 RX ADMIN — SODIUM CHLORIDE 75 MILLILITER(S): 9 INJECTION, SOLUTION INTRAVENOUS at 02:57

## 2025-04-27 RX ADMIN — Medication 40 MILLIGRAM(S): at 05:32

## 2025-04-27 NOTE — PROGRESS NOTE ADULT - ASSESSMENT
Recurrent syncope.  Doubt seizures.       Reasonable to pursue a VEEG but no MRI needed  please contact us if events recur

## 2025-04-28 ENCOUNTER — APPOINTMENT (OUTPATIENT)
Dept: ULTRASOUND IMAGING | Facility: IMAGING CENTER | Age: 70
End: 2025-04-28

## 2025-04-28 ENCOUNTER — NON-APPOINTMENT (OUTPATIENT)
Age: 70
End: 2025-04-28

## 2025-04-28 DIAGNOSIS — G47.34 IDIOPATHIC SLEEP RELATED NONOBSTRUCTIVE ALVEOLAR HYPOVENTILATION: ICD-10-CM

## 2025-04-28 DIAGNOSIS — Z29.9 ENCOUNTER FOR PROPHYLACTIC MEASURES, UNSPECIFIED: ICD-10-CM

## 2025-04-28 LAB
ALBUMIN SERPL ELPH-MCNC: 4.1 G/DL
ALP BLD-CCNC: 79 U/L
ALT SERPL-CCNC: 24 U/L
ANION GAP SERPL CALC-SCNC: 12 MMOL/L — SIGNIFICANT CHANGE UP (ref 7–14)
ANION GAP SERPL CALC-SCNC: 20 MMOL/L
AST SERPL-CCNC: 21 U/L
BILIRUB SERPL-MCNC: 0.2 MG/DL
BUN SERPL-MCNC: 11 MG/DL
BUN SERPL-MCNC: 19 MG/DL — SIGNIFICANT CHANGE UP (ref 7–23)
CALCIUM SERPL-MCNC: 8.9 MG/DL — SIGNIFICANT CHANGE UP (ref 8.4–10.5)
CALCIUM SERPL-MCNC: 9.6 MG/DL
CHLORIDE SERPL-SCNC: 104 MMOL/L
CHLORIDE SERPL-SCNC: 110 MMOL/L — HIGH (ref 98–107)
CO2 SERPL-SCNC: 19 MMOL/L
CO2 SERPL-SCNC: 22 MMOL/L — SIGNIFICANT CHANGE UP (ref 22–31)
CREAT SERPL-MCNC: 0.88 MG/DL
CREAT SERPL-MCNC: 0.92 MG/DL — SIGNIFICANT CHANGE UP (ref 0.5–1.3)
DEPRECATED D DIMER PPP IA-ACNC: 353 NG/ML DDU
EGFR: 68 ML/MIN/1.73M2 — SIGNIFICANT CHANGE UP
EGFR: 68 ML/MIN/1.73M2 — SIGNIFICANT CHANGE UP
EGFRCR SERPLBLD CKD-EPI 2021: 72 ML/MIN/1.73M2
GLUCOSE BLDC GLUCOMTR-MCNC: 103 MG/DL — HIGH (ref 70–99)
GLUCOSE BLDC GLUCOMTR-MCNC: 156 MG/DL — HIGH (ref 70–99)
GLUCOSE BLDC GLUCOMTR-MCNC: 99 MG/DL — SIGNIFICANT CHANGE UP (ref 70–99)
GLUCOSE SERPL-MCNC: 104 MG/DL — HIGH (ref 70–99)
GLUCOSE SERPL-MCNC: 85 MG/DL
HCT VFR BLD CALC: 38.8 % — SIGNIFICANT CHANGE UP (ref 34.5–45)
HGB BLD-MCNC: 12.1 G/DL — SIGNIFICANT CHANGE UP (ref 11.5–15.5)
MAGNESIUM SERPL-MCNC: 1.9 MG/DL — SIGNIFICANT CHANGE UP (ref 1.6–2.6)
MCHC RBC-ENTMCNC: 24.2 PG — LOW (ref 27–34)
MCHC RBC-ENTMCNC: 31.2 G/DL — LOW (ref 32–36)
MCV RBC AUTO: 77.8 FL — LOW (ref 80–100)
NRBC # BLD AUTO: 0 K/UL — SIGNIFICANT CHANGE UP (ref 0–0)
NRBC # FLD: 0 K/UL — SIGNIFICANT CHANGE UP (ref 0–0)
NRBC BLD AUTO-RTO: 0 /100 WBCS — SIGNIFICANT CHANGE UP (ref 0–0)
NT-PROBNP SERPL-MCNC: 37 PG/ML
PHOSPHATE SERPL-MCNC: 3.8 MG/DL — SIGNIFICANT CHANGE UP (ref 2.5–4.5)
PLATELET # BLD AUTO: 194 K/UL — SIGNIFICANT CHANGE UP (ref 150–400)
POTASSIUM SERPL-MCNC: 3.6 MMOL/L — SIGNIFICANT CHANGE UP (ref 3.5–5.3)
POTASSIUM SERPL-SCNC: 3.6 MMOL/L — SIGNIFICANT CHANGE UP (ref 3.5–5.3)
POTASSIUM SERPL-SCNC: 4.4 MMOL/L
PROT SERPL-MCNC: 7 G/DL
RBC # BLD: 4.99 M/UL — SIGNIFICANT CHANGE UP (ref 3.8–5.2)
RBC # FLD: 14.7 % — HIGH (ref 10.3–14.5)
SODIUM SERPL-SCNC: 143 MMOL/L
SODIUM SERPL-SCNC: 144 MMOL/L — SIGNIFICANT CHANGE UP (ref 135–145)
WBC # BLD: 9.28 K/UL — SIGNIFICANT CHANGE UP (ref 3.8–10.5)
WBC # FLD AUTO: 9.28 K/UL — SIGNIFICANT CHANGE UP (ref 3.8–10.5)

## 2025-04-28 PROCEDURE — 93971 EXTREMITY STUDY: CPT | Mod: 26,RT

## 2025-04-28 PROCEDURE — 99222 1ST HOSP IP/OBS MODERATE 55: CPT | Mod: GC

## 2025-04-28 PROCEDURE — 99233 SBSQ HOSP IP/OBS HIGH 50: CPT

## 2025-04-28 RX ADMIN — PREDNISONE 30 MILLIGRAM(S): 20 TABLET ORAL at 06:15

## 2025-04-28 RX ADMIN — ENOXAPARIN SODIUM 40 MILLIGRAM(S): 100 INJECTION SUBCUTANEOUS at 17:06

## 2025-04-28 RX ADMIN — Medication 40 MILLIGRAM(S): at 06:15

## 2025-04-28 NOTE — CONSULT NOTE ADULT - ATTENDING COMMENTS
69 F with  here with syncopal episode, also treated for lockjaw in ED.  Had syncopal episode in front of us but was not apneic, and was hemodynamically stable at time. Lasted a few seconds and she self-recovered  No events on tele.  No hypocalcemia.  Appears hypovolemic on POCUS and she has been purposefully watching what she eats while on prednisone.  Would give IVF bolus now  check formal TTE to r/o dynamic LVOT obstruction now  get LE duplex  continue with prednisone 30 mg po daily for cryptogenic organizing pneumonia  admit to tele
Crytpogenic organizing pneumonia admitted post syncopal episode.  Unclear if all events are truly syncope as she does not necessarily have LOS.  Follow up EEG.  Obtain LE duplex.   Continue with prednisone 30 mg po daily
seen and examined on rounds  chart reviewed    69f  she has cryptogenic organizing PNA  recently started steroids (pred 20 x1w then 10)  syncope today after blood draw but then repeated events in ER of brief LOC without seizure  some she recalls some she doesnt  I watched a few and she was unresponsive for seconds, but with stable vitals  she has long standing right leg cramps  she also has episodes of facial spasms today  k and ca normal     no prior such events    on exam she's essentially normal between events    impression is possibly electrolyte disturbance in setting of steroids  monitor for recurrent events  no need for EEG or MRI unless events persist  check Ica, CK, PTH

## 2025-04-28 NOTE — PROGRESS NOTE ADULT - ASSESSMENT
69 yr old female with a pmh of cryptogenic organizing pneumonia on steroids (pred 10 daily x 1 month), JASEN, prior syncope (when having blood drawn ~1yr ago) who presents with a syncopal episode while at her Pulmonologist office having blood drawn

## 2025-04-28 NOTE — CONSULT NOTE ADULT - ASSESSMENT
69-year-old female with a history of cryptogenic organizing pneumonia admitted due to syncope. At this moment no evidence of exacerbation.    #  - Continue Prednisone 30mg for 2 weeks, then 20mg daily  - OP Follow up with Dr. Abel on 5/14  - Use home token on discharge   - Albuterol inhaler 4 inh Q4h PRN  - Continue PPI  - TTE as per primary team   - Pulm will sign off  69-year-old female with a history of cryptogenic organizing pneumonia admitted due to syncope. At this moment no evidence of exacerbation.    #  #Night desaturations  - Continue Prednisone 30mg for 2 weeks, then 20mg daily  - OP Follow up with Dr. Abel on 5/14  - Elevated D dimer as OP, please obtain DVT study  - Sleep study as OP. NO need for night oxymetry or ABG  - Use home token on discharge   - Albuterol inhaler 4 inh Q4h PRN  - Continue PPI  - TTE as per primary team

## 2025-04-28 NOTE — CONSULT NOTE ADULT - SUBJECTIVE AND OBJECTIVE BOX
HPI:    69-year-old female with a history of cryptogenic organizing pneumonia biopsy proved.    Patient seen in Pulm Clinic 4/25 with Dr Abel with complain for dry cough and worsening SOB that has been persisting for several months with occasional wheezing that does not respond to Ventolin. Patient has been on 10mg of Prednisone daily and PPI. Patient had normal saturation at rest but desaturates to SatO2 87% upon ambulation. Recommendation was given to increase Prednisone to 30mg for 2 weeks and then 20mg daily til follow up in clinic on 5/14. Pulmonary consulted at this time for history of .    Upon review of  patient with negative autoimmune work up in 2019 with diagnosis of  on 4/2010 after open lung biopsy.     Patient admitted at this time due to episode of syncope during blood draw as well as trismus, as per Neurology and review of imaging no evidence of stroke or neurological deficits.       PAST MEDICAL & SURGICAL HISTORY:  Cryptogenic Organizing Pneumonia      Iron Deficiency Anemia      No significant past surgical history          FAMILY HISTORY:  No pertinent family history in first degree relatives        SOCIAL HISTORY:  Smoking: [ ] Never Smoked [ ] Former Smoker (__ packs x ___ years) [ ] Current Smoker  (__ packs x ___ years)  Substance Use: [ ] Never Used [ ] Used ____  EtOH Use:  Marital Status: [ ] Single [ ]  [ ]  [ ]   Sexual History:   Occupation:  Recent Travel:  Country of Birth:  Advance Directives:    Allergies    No Known Allergies    Intolerances        HOME MEDICATIONS:  Home Medications:  Omeprazole 20 MG Oral Capsule Delayed Release: 1 cap(s) orally once a day (in the morning) before breakfast (25 Apr 2025 18:20)  PredniSONE 10 MG Oral Tablet: 1 tab(s) orally once a day (25 Apr 2025 18:20)      REVIEW OF SYSTEMS:  All systems negative except as documented above.    OBJECTIVE:  ICU Vital Signs Last 24 Hrs  T(C): 36.7 (28 Apr 2025 04:00), Max: 37.1 (27 Apr 2025 12:00)  T(F): 98 (28 Apr 2025 04:00), Max: 98.7 (27 Apr 2025 12:00)  HR: 80 (28 Apr 2025 04:00) (69 - 91)  BP: 114/64 (28 Apr 2025 04:00) (114/64 - 154/93)  BP(mean): --  ABP: --  ABP(mean): --  RR: 18 (28 Apr 2025 04:00) (16 - 18)  SpO2: 100% (28 Apr 2025 04:00) (97% - 100%)    O2 Parameters below as of 28 Apr 2025 04:00  Patient On (Oxygen Delivery Method): room air              04-27 @ 07:01  -  04-28 @ 07:00  --------------------------------------------------------  IN: 461 mL / OUT: 2170 mL / NET: -1709 mL      CAPILLARY BLOOD GLUCOSE      POCT Blood Glucose.: 109 mg/dL (27 Apr 2025 22:37)      PHYSICAL EXAM:  General: NAD  HEENT: EOMI, sclera anicteric  Neck: supple  Cardiovascular: RR  Respiratory: CTAB, no wheezes, crackles, or rhonci  Abdomen: soft  Extremities: warm and well perfused, no edema, no clubbing  Skin: no rashes  Neurological: AOx3, no focal deficits    HOSPITAL MEDICATIONS:  Standing Meds:  dextrose 5% + sodium chloride 0.9%. 1000 milliLiter(s) IV Continuous <Continuous>  enoxaparin Injectable 40 milliGRAM(s) SubCutaneous every 24 hours  influenza  Vaccine (HIGH DOSE) 0.5 milliLiter(s) IntraMuscular once  pantoprazole    Tablet 40 milliGRAM(s) Oral before breakfast  predniSONE   Tablet 30 milliGRAM(s) Oral daily      PRN Meds:  acetaminophen     Tablet .. 650 milliGRAM(s) Oral every 6 hours PRN  aluminum hydroxide/magnesium hydroxide/simethicone Suspension 30 milliLiter(s) Oral every 4 hours PRN  melatonin 3 milliGRAM(s) Oral at bedtime PRN  ondansetron Injectable 4 milliGRAM(s) IV Push every 8 hours PRN      LABS:                        12.1   9.28  )-----------( 194      ( 28 Apr 2025 04:00 )             38.8     Hgb Trend: 12.1<--, 13.1<--, 13.6<--, 13.6<--, 14.3<--  04-28    144  |  110[H]  |  19  ----------------------------<  104[H]  3.6   |  22  |  0.92    Ca    8.9      28 Apr 2025 04:00  Phos  3.8     04-28  Mg     1.90     04-28      Creatinine Trend: 0.92<--, 0.80<--, 0.87<--, 0.86<--, 0.79<--    Urinalysis Basic - ( 28 Apr 2025 04:00 )    Color: x / Appearance: x / SG: x / pH: x  Gluc: 104 mg/dL / Ketone: x  / Bili: x / Urobili: x   Blood: x / Protein: x / Nitrite: x   Leuk Esterase: x / RBC: x / WBC x   Sq Epi: x / Non Sq Epi: x / Bacteria: x            MICROBIOLOGY:     Urinalysis with Rflx Culture (collected 25 Apr 2025 15:54)    Culture - Urine (collected 25 Apr 2025 14:20)  Source: Clean Catch Clean Catch (Midstream)  Final Report (27 Apr 2025 02:04):    <10,000 CFU/mL Normal Urogenital Khloe    Culture - Blood (collected 25 Apr 2025 12:00)  Source: Blood Blood-Peripheral  Preliminary Report (27 Apr 2025 19:01):    No growth at 48 Hours    Culture - Blood (collected 25 Apr 2025 11:50)  Source: Blood Blood-Peripheral  Preliminary Report (27 Apr 2025 19:01):    No growth at 48 Hours        RADIOLOGY:  [x] Reviewed and interpreted by me

## 2025-04-28 NOTE — EEG REPORT - NS EEG TEXT BOX
[facility]   COMPREHENSIVE EPILEPSY CENTER   REPORT OF LONG-TERM VIDEO EEG     [facility]: [address], [place] [address], Ph#: [phone]  [facility]: [address] [place] [address], Ph#: [phone]  [facility]: [address] [place] [address], Ph#: [phone]    Patient Name: NÉSTOR SIMPSON  Age and : 69y (59)  MRN #: [id number]  Location: [place] [address] [address]  Referring Physician: Leslie Bray    Start Time/Date: 11:41 on 2025  End Time/Date: 08:00 on 2025  Duration: 20 hours 19 minutes    _____________________________________________________________  STUDY INFORMATION    EEG Recording Technique:  The patient underwent continuous Video-EEG monitoring, using Telemetry System hardware on the XLTek Digital System. EEG and video data were stored on a computer hard drive with important events saved in digital archive files. The material was reviewed by a physician (electroencephalographer / epileptologist) on a daily basis. Edwar and seizure detection algorithms were utilized and reviewed. An EEG Technician attended to the patient, and was available throughout daytime work hours.  The epilepsy center neurologist was available in person or on call 24-hours per day.    EEG Placement and Labeling of Electrodes:  The EEG was performed utilizing 20 channel referential EEG connections (coronal over temporal over parasagittal montage) using all standard 10-20 electrode placements with EKG, with additional electrodes placed in the inferior temporal region using the modified 10-10 montage electrode placements for elective admissions, or if deemed necessary. Recording was at a sampling rate of 256 samples per second per channel. Time synchronized digital video recording was done simultaneously with EEG recording. A low light infrared camera was used for low light recording.     _____________________________________________________________  HISTORY    Patient is a 69y old  Female who presents with a chief complaint of syncope (2025 07:40)      PERTINENT MEDICATION:  MEDICATIONS  (STANDING):  dextrose 5% + sodium chloride 0.9%. 1000 milliLiter(s) (75 mL/Hr) IV Continuous <Continuous>  enoxaparin Injectable 40 milliGRAM(s) SubCutaneous every 24 hours  influenza  Vaccine (HIGH DOSE) 0.5 milliLiter(s) IntraMuscular once  pantoprazole    Tablet 40 milliGRAM(s) Oral before breakfast  predniSONE   Tablet 30 milliGRAM(s) Oral daily    _____________________________________________________________  STUDY INTERPRETATION      FINDINGS:      Background:  Continuity: continuous  Symmetry: symmetric  PDR: 10 Hz activity, with amplitude to 40 uV, that attenuated to eye opening.  Low amplitude frontal beta noted in wakefulness.  Reactivity: present  Voltage: normal, mostly 20-150uV  Anterior Posterior Gradient: present  Other background findings: none  Breach: absent    Background Slowing:  Generalized slowing: none was present.  Focal slowing: none was present.    State Changes:   -Drowsiness noted with increased slowing, attenuation of fast activity, vertex transients.  -Present with N2 sleep transients with symmetric spindles and K-complexes.  -N2 sleep transients were not recorded.  -Absent    Sporadic Epileptiform Discharges:    None    Rhythmic and Periodic Patterns (RPPs):  None     Electrographic and Electroclinical seizures:  None    Other Clinical Events:  None    Activation Procedures:   -Hyperventilation was not performed.    -Photic stimulation was performed and did not elicit any abnormalities.      Artifacts:  Intermittent myogenic and movement artifacts were noted.    ECG:  The heart rate on single channel ECG was predominantly ~80 bpm.    Summary:  Normal EEG in the awake / drowsy / asleep state(s).    Clinical Impression:  There were no epileptiform abnormalities recorded.      -------------------------------------------------------------------------------------------------------    Emily Dowell MD  Director, [facility]    Questions please call [phone]  After hours (5 PM - 8:30 AM) please call the epilepsy answering service at [phone]       [facility]   COMPREHENSIVE EPILEPSY CENTER   REPORT OF LONG-TERM VIDEO EEG     [facility]: [address], [address], [place] [address], Ph#: [phone]  [facility]: [address] [place] [address], Ph#: [phone]  [facility]: [address] [place] [address], Ph#: [phone]    Patient Name: NÉSTOR SIMPSON  Age and : 69y (59)  MRN #: [id number]  Location: [place] [address] A  Referring Physician: Leslie Bray    Start Time/Date: 11:41 on 2025  End Time/Date: 13:32 on 2025  Duration: 25 hours 51 minutes    _____________________________________________________________  STUDY INFORMATION    EEG Recording Technique:  The patient underwent continuous Video-EEG monitoring, using Telemetry System hardware on the XLTek Digital System. EEG and video data were stored on a computer hard drive with important events saved in digital archive files. The material was reviewed by a physician (electroencephalographer / epileptologist) on a daily basis. Edwar and seizure detection algorithms were utilized and reviewed. An EEG Technician attended to the patient, and was available throughout daytime work hours.  The epilepsy center neurologist was available in person or on call 24-hours per day.    EEG Placement and Labeling of Electrodes:  The EEG was performed utilizing 20 channel referential EEG connections (coronal over temporal over parasagittal montage) using all standard 10-20 electrode placements with EKG, with additional electrodes placed in the inferior temporal region using the modified 10-10 montage electrode placements for elective admissions, or if deemed necessary. Recording was at a sampling rate of 256 samples per second per channel. Time synchronized digital video recording was done simultaneously with EEG recording. A low light infrared camera was used for low light recording.     _____________________________________________________________  HISTORY    Patient is a 69y old  Female who presents with a chief complaint of syncope (2025 07:40)      PERTINENT MEDICATION:  MEDICATIONS  (STANDING):  dextrose 5% + sodium chloride 0.9%. 1000 milliLiter(s) (75 mL/Hr) IV Continuous <Continuous>  enoxaparin Injectable 40 milliGRAM(s) SubCutaneous every 24 hours  influenza  Vaccine (HIGH DOSE) 0.5 milliLiter(s) IntraMuscular once  pantoprazole    Tablet 40 milliGRAM(s) Oral before breakfast  predniSONE   Tablet 30 milliGRAM(s) Oral daily    _____________________________________________________________  STUDY INTERPRETATION      FINDINGS:      Background:  Continuity: continuous  Symmetry: symmetric  PDR: 10 Hz activity, with amplitude to 40 uV, that attenuated to eye opening.  Low amplitude frontal beta noted in wakefulness.  Reactivity: present  Voltage: normal, mostly 20-150uV  Anterior Posterior Gradient: present  Other background findings: none  Breach: absent    Background Slowing:  Generalized slowing: none was present.  Focal slowing: none was present.    State Changes:   -Drowsiness noted with increased slowing, attenuation of fast activity, vertex transients.  -Present with N2 sleep transients with symmetric spindles and K-complexes.  -N2 sleep transients were not recorded.  -Absent    Sporadic Epileptiform Discharges:    None    Rhythmic and Periodic Patterns (RPPs):  None     Electrographic and Electroclinical seizures:  None    Other Clinical Events:  None    Activation Procedures:   -Hyperventilation was not performed.    -Photic stimulation was performed and did not elicit any abnormalities.      Artifacts:  Intermittent myogenic and movement artifacts were noted.    ECG:  The heart rate on single channel ECG was predominantly ~80 bpm.    Summary:  Normal EEG in the awake / drowsy / asleep state(s).    Clinical Impression:  There were no epileptiform abnormalities recorded.      -------------------------------------------------------------------------------------------------------    Emily Dowell MD  Director, [facility]    Questions please call [phone]  After hours (5 PM - 8:30 AM) please call the epilepsy answering service at [phone]

## 2025-04-29 ENCOUNTER — TRANSCRIPTION ENCOUNTER (OUTPATIENT)
Age: 70
End: 2025-04-29

## 2025-04-29 ENCOUNTER — RESULT REVIEW (OUTPATIENT)
Age: 70
End: 2025-04-29

## 2025-04-29 DIAGNOSIS — R41.82 ALTERED MENTAL STATUS, UNSPECIFIED: ICD-10-CM

## 2025-04-29 LAB
GLUCOSE BLDC GLUCOMTR-MCNC: 129 MG/DL — HIGH (ref 70–99)
GLUCOSE BLDC GLUCOMTR-MCNC: 78 MG/DL — SIGNIFICANT CHANGE UP (ref 70–99)
GLUCOSE BLDC GLUCOMTR-MCNC: 91 MG/DL — SIGNIFICANT CHANGE UP (ref 70–99)
GLUCOSE BLDC GLUCOMTR-MCNC: 97 MG/DL — SIGNIFICANT CHANGE UP (ref 70–99)

## 2025-04-29 PROCEDURE — 99232 SBSQ HOSP IP/OBS MODERATE 35: CPT

## 2025-04-29 PROCEDURE — 70553 MRI BRAIN STEM W/O & W/DYE: CPT | Mod: 26

## 2025-04-29 PROCEDURE — 93971 EXTREMITY STUDY: CPT | Mod: 26,LT

## 2025-04-29 RX ADMIN — Medication 1 APPLICATION(S): at 17:17

## 2025-04-29 RX ADMIN — PREDNISONE 30 MILLIGRAM(S): 20 TABLET ORAL at 05:11

## 2025-04-29 RX ADMIN — Medication 40 MILLIGRAM(S): at 05:11

## 2025-04-29 RX ADMIN — ENOXAPARIN SODIUM 40 MILLIGRAM(S): 100 INJECTION SUBCUTANEOUS at 17:17

## 2025-04-29 NOTE — PROGRESS NOTE ADULT - ASSESSMENT
69 yr old female with a PMH of cryptogenic organizing pneumonia on steroids, JASEN, prior syncope (when having blood drawn ~1yr ago) who presents with a syncopal episode while at her Pulmonologist office having blood drawn.

## 2025-04-30 ENCOUNTER — TRANSCRIPTION ENCOUNTER (OUTPATIENT)
Age: 70
End: 2025-04-30

## 2025-04-30 LAB
ANION GAP SERPL CALC-SCNC: 14 MMOL/L — SIGNIFICANT CHANGE UP (ref 7–14)
BUN SERPL-MCNC: 18 MG/DL — SIGNIFICANT CHANGE UP (ref 7–23)
CALCIUM SERPL-MCNC: 9.3 MG/DL — SIGNIFICANT CHANGE UP (ref 8.4–10.5)
CHLORIDE SERPL-SCNC: 103 MMOL/L — SIGNIFICANT CHANGE UP (ref 98–107)
CO2 SERPL-SCNC: 23 MMOL/L — SIGNIFICANT CHANGE UP (ref 22–31)
CREAT SERPL-MCNC: 0.82 MG/DL — SIGNIFICANT CHANGE UP (ref 0.5–1.3)
CULTURE RESULTS: SIGNIFICANT CHANGE UP
CULTURE RESULTS: SIGNIFICANT CHANGE UP
EGFR: 78 ML/MIN/1.73M2 — SIGNIFICANT CHANGE UP
EGFR: 78 ML/MIN/1.73M2 — SIGNIFICANT CHANGE UP
GLUCOSE SERPL-MCNC: 75 MG/DL — SIGNIFICANT CHANGE UP (ref 70–99)
HCT VFR BLD CALC: 40.6 % — SIGNIFICANT CHANGE UP (ref 34.5–45)
HGB BLD-MCNC: 12.4 G/DL — SIGNIFICANT CHANGE UP (ref 11.5–15.5)
MAGNESIUM SERPL-MCNC: 1.8 MG/DL — SIGNIFICANT CHANGE UP (ref 1.6–2.6)
MCHC RBC-ENTMCNC: 24.1 PG — LOW (ref 27–34)
MCHC RBC-ENTMCNC: 30.5 G/DL — LOW (ref 32–36)
MCV RBC AUTO: 79 FL — LOW (ref 80–100)
MRSA PCR RESULT.: SIGNIFICANT CHANGE UP
NRBC # BLD AUTO: 0 K/UL — SIGNIFICANT CHANGE UP (ref 0–0)
NRBC # FLD: 0 K/UL — SIGNIFICANT CHANGE UP (ref 0–0)
NRBC BLD AUTO-RTO: 0 /100 WBCS — SIGNIFICANT CHANGE UP (ref 0–0)
PHOSPHATE SERPL-MCNC: 3.5 MG/DL — SIGNIFICANT CHANGE UP (ref 2.5–4.5)
PLATELET # BLD AUTO: 178 K/UL — SIGNIFICANT CHANGE UP (ref 150–400)
POTASSIUM SERPL-MCNC: 3.8 MMOL/L — SIGNIFICANT CHANGE UP (ref 3.5–5.3)
POTASSIUM SERPL-SCNC: 3.8 MMOL/L — SIGNIFICANT CHANGE UP (ref 3.5–5.3)
PROT SERPL-MCNC: 7.6 G/DL — SIGNIFICANT CHANGE UP (ref 6–8.3)
RBC # BLD: 5.14 M/UL — SIGNIFICANT CHANGE UP (ref 3.8–5.2)
RBC # FLD: 14.7 % — HIGH (ref 10.3–14.5)
S AUREUS DNA NOSE QL NAA+PROBE: SIGNIFICANT CHANGE UP
SODIUM SERPL-SCNC: 140 MMOL/L — SIGNIFICANT CHANGE UP (ref 135–145)
SPECIMEN SOURCE: SIGNIFICANT CHANGE UP
SPECIMEN SOURCE: SIGNIFICANT CHANGE UP
WBC # BLD: 10.42 K/UL — SIGNIFICANT CHANGE UP (ref 3.8–10.5)
WBC # FLD AUTO: 10.42 K/UL — SIGNIFICANT CHANGE UP (ref 3.8–10.5)

## 2025-04-30 PROCEDURE — 84165 PROTEIN E-PHORESIS SERUM: CPT | Mod: 26

## 2025-04-30 PROCEDURE — 99239 HOSP IP/OBS DSCHRG MGMT >30: CPT

## 2025-04-30 PROCEDURE — 99233 SBSQ HOSP IP/OBS HIGH 50: CPT

## 2025-04-30 RX ORDER — ASPIRIN 325 MG
81 TABLET ORAL DAILY
Refills: 0 | Status: DISCONTINUED | OUTPATIENT
Start: 2025-04-30 | End: 2025-05-01

## 2025-04-30 RX ORDER — ATORVASTATIN CALCIUM 80 MG/1
40 TABLET, FILM COATED ORAL AT BEDTIME
Refills: 0 | Status: DISCONTINUED | OUTPATIENT
Start: 2025-04-30 | End: 2025-05-01

## 2025-04-30 RX ORDER — ASPIRIN 325 MG
1 TABLET ORAL
Qty: 30 | Refills: 0
Start: 2025-04-30 | End: 2025-05-29

## 2025-04-30 RX ORDER — ATORVASTATIN CALCIUM 80 MG/1
1 TABLET, FILM COATED ORAL
Qty: 30 | Refills: 0
Start: 2025-04-30 | End: 2025-05-29

## 2025-04-30 RX ORDER — PREDNISONE 20 MG/1
3 TABLET ORAL
Qty: 69 | Refills: 0
Start: 2025-04-30 | End: 2025-05-29

## 2025-04-30 RX ADMIN — Medication 1 APPLICATION(S): at 11:28

## 2025-04-30 RX ADMIN — ATORVASTATIN CALCIUM 40 MILLIGRAM(S): 80 TABLET, FILM COATED ORAL at 21:20

## 2025-04-30 RX ADMIN — Medication 81 MILLIGRAM(S): at 21:19

## 2025-04-30 RX ADMIN — Medication 3 MILLIGRAM(S): at 21:20

## 2025-04-30 RX ADMIN — PREDNISONE 30 MILLIGRAM(S): 20 TABLET ORAL at 04:53

## 2025-04-30 RX ADMIN — ENOXAPARIN SODIUM 40 MILLIGRAM(S): 100 INJECTION SUBCUTANEOUS at 17:48

## 2025-04-30 RX ADMIN — Medication 40 MILLIGRAM(S): at 04:53

## 2025-04-30 NOTE — CHART NOTE - NSCHARTNOTEFT_GEN_A_CORE
MR brain completed on 4/29, report and imaging reviewed. 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 Appears to be incidental finding and would not explain patient's intermittent syncope. Labs reviewed, patient has A1c 5.5 and . Please start patient on aspirin 81mg daily and Lipitor 80mg daily for stroke prevention. Goal LDL<70 in stroke patients. TTE on 4/29 shows normal EF (69%). Continue patient on telemetry to rule-out occult afib; consult EP for ILR placement prior to discharge. Patient can follow-up in neurology clinic with Dr Cancino ([phone]). Neurology signing off, call [phone] with any questions MR brain completed on 4/29, report and imaging reviewed. 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 Appears to be incidental finding and would not explain patient's intermittent syncope. Labs reviewed, patient has A1c 5.5 and . Please start patient on aspirin 81mg daily and Lipitor daily for stroke prevention. Goal LDL<70 in stroke patients. TTE on 4/29 shows normal EF (69%). e. Patient can follow-up in neurology clinic with Dr Kelly ([phone]).

## 2025-04-30 NOTE — PROGRESS NOTE ADULT - ASSESSMENT
This is a 69 year old woman with a pmh of cryptogenic organizing pneumonia on steroids (pred 10 daily x 1 month), JASEN, hx syncope (when having blood drawn ~1yr ago) who presents with a syncopal episode while at her pulmonologist office having blood drawn. MR brain completed on 4/29, report and imaging reviewed. 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 Neurology recommended ILR. Ep was consulted for ILR prolonged monitoring for detection of cardiac arrhythmia as cause of potential cardioembolic source.  The risks and benefits were explained in detail which included but not limited to bleeding, infection, stroke, syncope 2/2 vasovagal, intubation, and death. Patient expressed understanding and all questions were answered. Patient is consented for ILR.    Plan:  - Continuous telemetric monitoring  - Monitor electrolytes and replete K to 4 and Mg to 2  - . TTE showed normal LV systolic functionEF 69 %, mild LV diastolic dysfunction, normal RV systolic function, and mild MR.  - Tentatively plan for ILR today  - Appreciate Neurology recs  - Continue care per primary team    Patient to be staffed with attending. Please await attending addendum

## 2025-04-30 NOTE — PROGRESS NOTE ADULT - PROBLEM SELECTOR PLAN 2
- CT chest with Chest: Redemonstration of fibrotic interstitial disease; stable appearing from a 4/5/2025 chest CT  - Per Pulmonary: "Increase prednisone to 30mg daily x 2 weeks, then down to 20mg daily. "  - continue prednisone 30 mg daily, continue PPI
Per Pulmonary note: "Increase prednisone to 30mg daily x 2 weeks, then down to 20mg daily. "  prednisone  30 mg daily, continue PPI
Per Pulmonary note: "Increase prednisone to 30mg daily x 2 weeks, then down to 20mg daily. "  prednisone  30 mg daily, continue PPI
- CT chest with Chest: Redemonstration of fibrotic interstitial disease; stable appearing from a 4/5/2025 chest CT  - Per Pulmonary: "Increase prednisone to 30mg daily x 2 weeks, then down to 20mg daily. "
- CT chest with Chest: Redemonstration of fibrotic interstitial disease; stable appearing from a 4/5/2025 chest CT  - Per Pulmonary: "Increase prednisone to 30mg daily x 2 weeks, then down to 20mg daily. "  - continue prednisone 30 mg daily, continue PPI

## 2025-04-30 NOTE — PHARMACOTHERAPY INTERVENTION NOTE - COMMENTS
Medication history is complete. Medication list updated in Outpatient Medication Record (OMR) via [facility] and patient.     Home Medications:  Omeprazole 20 MG Oral Capsule Delayed Release: 1 cap(s) orally once a day (in the morning) before breakfast   PredniSONE 10 MG Oral Tablet: 1 tab(s) orally once a day    Please call spectra [phone] if you have any questions. 
Discharge medications were reviewed with the patient. Current medication schedule was discussed in detail including: medication name, indication, dose, administration times, side effects, and special instructions. All questions and concerns were answered and addressed. Patient verbalized understanding and was provided with educational handouts.    Kaley Ren, PharmD, [facility]  Clinical Pharmacy Specialist  Spectra [phone]

## 2025-04-30 NOTE — PHYSICAL THERAPY INITIAL EVALUATION ADULT - ADDITIONAL COMMENTS
Pt lives with her son and daughter in a house with 5 stairs to enter; Pt resides on the first floor. prior to admission Pt was independent with all mobility and ambulated without an assistive device. Pt denied any recent falls.     Pt. left comfortable sitting edge of bed with all tubes/lines intact, +bed alarm, call bell in reach and in NAD. RN, Judi SIMMONS, aware of session and pt current position.

## 2025-04-30 NOTE — DISCHARGE NOTE PROVIDER - NSDCCPCAREPLAN_GEN_ALL_CORE_FT
PRINCIPAL DISCHARGE DIAGNOSIS  Diagnosis: Altered mental status  Assessment and Plan of Treatment: - you presented for periodic episodes of staring into space, able to listen and understand, but not able to speak.   - we did a broad neurologic workup for this  - the echo showed your heart is overall okay and does not have changes that could explain your symtptoms   - we did an EEG that did not show any seizure even while you had these episodes   - your MRI showed an old stroke. For this, please continue aspirin 81 mg daily and atorvastatin 40 mg daily    - we sent off some more blood work to follow up with the neurologists in clinic. They will likely repeat a MRI later to compare the changes found on your scan      SECONDARY DISCHARGE DIAGNOSES  Diagnosis: Cryptogenic organizing pneumonia  Assessment and Plan of Treatment: - we had our lung doctors see you and they reccommend to increase prednisone to 30mg daily for only two weeks, thencontinue 20mg daily.    Diagnosis: Nocturnal oxygen desaturation  Assessment and Plan of Treatment: - you also need a sleep study as your oxygen levels briefly go down with deep sleep   - please see your PCP for this

## 2025-04-30 NOTE — PROGRESS NOTE ADULT - PROBLEM SELECTOR PLAN 3
- pulmonology notified   - Sleep study as OP. NO need for night oxymetry or ABG  - Albuterol inhaler 4 inh Q4h PRN

## 2025-04-30 NOTE — PROGRESS NOTE ADULT - PROBLEM SELECTOR PLAN 4
DVT ppx: lovenox
DVT ppx: lovenox     Spoke to patient, sister at bedside, and Julisa (NP) at 
DVT ppx: lovenox

## 2025-04-30 NOTE — DISCHARGE NOTE PROVIDER - PROVIDER TOKENS
PROVIDER:[TOKEN:[[id number]]],PROVIDER:[TOKEN:[[id number]]],PROVIDER:[TOKEN:[[id number]]] PROVIDER:[TOKEN:[[id number]]],PROVIDER:[TOKEN:[[id number]]],PROVIDER:[TOKEN:[[id number]]]

## 2025-04-30 NOTE — DISCHARGE NOTE PROVIDER - HOSPITAL COURSE
69 yr old female with a PMH of cryptogenic organizing pneumonia on steroids, JASEN, prior syncope (when having blood drawn ~1yr ago) who presents with a "syncopal" episode while at her Pulmonologist office having blood drawn.     #Altered mental status  - patient has periodic episodes of staring into space, able to listen and understand, but not able to speak. She can follow commands during this time period. Lasts for 30 second to 2 minutes and spontaneously resolves. Unlikely syncope given patient has consciousness through this episode.   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 Otherwise, no evidence of significant stenosis or occlusion.  - UA/Utox negative  - EKG: nonischemic, troponin negative x 3  - EEG without any seizures  - TTE: LVEF 69%, G1DD, normal RV size and function, no sig contributory valvular disease   - MRI brain with contrast showed  no MRI evidence of acute intracranial pathology.  No mass or abnormal intracranial contrast enhancement.  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 Lyme serology sent off and fill be followed up by neurology.   - differential is broad. Patient unlikely has seizure as EEG is negative (had episodes on EEG). Could be Complex migraine, PNES. Will r/o lyme. Per neurology, plan will be to repeat MRI as OP and track changes over time.     #Cryptogenic organizing pneumonia.   - CT chest with Chest: Redemonstration of fibrotic interstitial disease; stable appearing from a 4/5/2025 chest CT  - Per Pulmonary: "Increase prednisone to 30mg daily x 2 weeks, then down to 20mg daily. "    #Nocturnal oxygen desaturation.   ·  Plan: - pulmonology notified   - Sleep study as OP. NO need for night oxymetry or ABG  - Albuterol inhaler 4 inh Q4h PRN.    Medically Stable for Discharge    69 yr old female with a PMH of cryptogenic organizing pneumonia on steroids, JASEN, prior syncope (when having blood drawn ~1yr ago) who presents with a "syncopal" episode while at her Pulmonologist office having blood drawn.     #Altered mental status  - patient has periodic episodes of staring into space, able to listen and understand, but not able to speak. She can follow commands during this time period. Lasts for 30 second to 2 minutes and spontaneously resolves. Unlikely syncope given patient has consciousness through this episode.   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 Otherwise, no evidence of significant stenosis or occlusion.  - UA/Utox negative  - EKG: nonischemic, troponin negative x 3  - EEG without any seizures  - TTE: LVEF 69%, G1DD, normal RV size and function, no sig contributory valvular disease   - MRI brain with contrast showed no MRI evidence of acute intracranial pathology.  No mass or abnormal intracranial contrast enhancement.  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 Lyme serology sent off and fill be followed up by neurology.   - differential is broad. Patient unlikely has seizure as EEG is negative (had episodes on EEG). Could be Complex migraine, PNES. Will r/o lyme. Per neurology, plan will be to repeat MRI as OP and track changes over time.     #Cryptogenic organizing pneumonia.   - CT chest with Chest: Redemonstration of fibrotic interstitial disease; stable appearing from a 4/5/2025 chest CT  - Per Pulmonary: "Increase prednisone to 30mg daily x 2 weeks, then down to 20mg daily. "    #Nocturnal oxygen desaturation.   ·  Plan: - pulmonology notified   - Sleep study as OP. NO need for night oxymetry or ABG  - Albuterol inhaler 4 inh Q4h PRN.    Medically Stable for Discharge

## 2025-04-30 NOTE — PHYSICAL THERAPY INITIAL EVALUATION ADULT - PERTINENT HX OF CURRENT PROBLEM, REHAB EVAL
Pt is a 69 year old female who presents to the ED after syncopal episode while drawing blood at pulmonologist office today.

## 2025-04-30 NOTE — DISCHARGE NOTE PROVIDER - CARE PROVIDER_API CALL
Daniel Abel.  Pulmonary Disease  [address], [address]  [place] [address]  Phone: [phone]  Fax: [phone]  Follow Up Time:     Mo Cancino  Neurology  [address], [address]  [place] [address]  Phone: [phone]  Fax: [phone]  Follow Up Time:     Gela Rosa  Cardiac Electrophysiology  [address] [place] [address], [address]  [place] [address]  Phone: [phone]  Fax: [phone]  Follow Up Time:    Daniel Abel.  Pulmonary Disease  [address], [address]  [place] [address]  Phone: [phone]  Fax: [phone]  Follow Up Time:     Mo Cancino  Neurology  [address], [address]  [place] [address]  Phone: [phone]  Fax: [phone]  Follow Up Time:     Geena Chávez  Cardiac Electrophysiology  [address] [place] [address], [address]  [place] [address]  Phone: [phone]  Fax: [phone]  Follow Up Time:

## 2025-04-30 NOTE — DISCHARGE NOTE PROVIDER - NSDCFUSCHEDAPPT_GEN_ALL_CORE_FT
[facility]  [facility]  ELECTROPH [phone]  Scheduled Appointment: 05/13/2025

## 2025-04-30 NOTE — DISCHARGE NOTE PROVIDER - CARE PROVIDERS DIRECT ADDRESSES
,[email or web address],DirectAddress_Unknown,DirectAddress_Unknown ,[email or web address],[email or web address] ( [email or web address]

## 2025-04-30 NOTE — PROGRESS NOTE ADULT - SUBJECTIVE AND OBJECTIVE BOX
Seen and examined    69f with   on recent steroids  admitted with recurrent syncopal episodes and episodes of facial grimacing with normal consciousness  ca and CK normal    INTERVAL HISTORY:  improved events last was yesterday PM.       MEDICATIONS  (STANDING):  dextrose 5% + sodium chloride 0.9%. 1000 milliLiter(s) (75 mL/Hr) IV Continuous <Continuous>  enoxaparin Injectable 40 milliGRAM(s) SubCutaneous every 24 hours  influenza  Vaccine (HIGH DOSE) 0.5 milliLiter(s) IntraMuscular once  pantoprazole    Tablet 40 milliGRAM(s) Oral before breakfast  predniSONE   Tablet 30 milliGRAM(s) Oral daily    Vital Signs Last 24 Hrs  T(C): 36.3 (27 Apr 2025 04:00), Max: 36.8 (26 Apr 2025 15:15)  T(F): 97.4 (27 Apr 2025 04:00), Max: 98.2 (26 Apr 2025 15:15)  HR: 82 (27 Apr 2025 04:00) (70 - 82)  BP: 125/77 (27 Apr 2025 04:00) (121/63 - 128/56)  BP(mean): --  RR: 17 (27 Apr 2025 04:00) (17 - 18)  SpO2: 99% (27 Apr 2025 04:00) (98% - 99%)    Parameters below as of 27 Apr 2025 04:00  Patient On (Oxygen Delivery Method): room air    (Exam as noted with exceptions in parentheses)    General:  Healthy appearing woman well groomed and without deformities    Mental Status:  Awake, alert and attentive. Oriented to person, place and time. Recent and remote memory intact. Normal concentration. Fluent spontaneous speech with intact naming and repetition. Normal fund of knowledge.      Cranial Nerves: II: Full visual fields. III,IV,VI:Pupils round, reactive to light. Full extraocular movements. No nystagmus. V: Normal bilateral bite, facial sensation. VII: No facial weakness. VIII: Hearing intact. IX,X: Palate midline, intact gag. XI:  Sternocleidomastoids normal. XII: Tongue protrudes midline. No dysarthria.     Motor:  Normal tone, bulk and power throughout including arms and legs, proximal and distal. No pronator drift. No abnormal movements.     Sensation: Normal in arms, legs and trunk to pin, proprioception and vibration. Negative Romberg.     Coordination: No dysmetria or dysdiadochokinesis bilaterally. Normal heel-shin testing.     Gait: Normal including heel and toe walking. Normal station.     Reflexes: Normoactive and symmetric throughout. Absent Babinski bilaterally.     Abd: non tender non distended    Cor: RRR    Legs : no peripheral edema        
America Corona MD  [facility] Division of Hospital Medicine  Pager: [phone]  Available via MS Teams    SUBJECTIVE / OVERNIGHT EVENTS:    continues to have episodes of altered mental status briefly for 1 minutes. Three episodes yesterday   continues to have consciousness through these episodes and even able to follow commands   but states she has aphagia with spontaneous resolution within 1 minute   discussed MRI for further eval and she was agreeable       MEDICATIONS  (STANDING):  dextrose 5% + sodium chloride 0.9%. 1000 milliLiter(s) (75 mL/Hr) IV Continuous <Continuous>  enoxaparin Injectable 40 milliGRAM(s) SubCutaneous every 24 hours  influenza  Vaccine (HIGH DOSE) 0.5 milliLiter(s) IntraMuscular once  pantoprazole    Tablet 40 milliGRAM(s) Oral before breakfast  predniSONE   Tablet 30 milliGRAM(s) Oral daily    MEDICATIONS  (PRN):  acetaminophen     Tablet .. 650 milliGRAM(s) Oral every 6 hours PRN Temp greater or equal to 38C (100.4F), Mild Pain (1 - 3)  aluminum hydroxide/magnesium hydroxide/simethicone Suspension 30 milliLiter(s) Oral every 4 hours PRN Dyspepsia  melatonin 3 milliGRAM(s) Oral at bedtime PRN Insomnia  ondansetron Injectable 4 milliGRAM(s) IV Push every 8 hours PRN Nausea and/or Vomiting      I&O's Summary    28 Apr 2025 07:01  -  29 Apr 2025 07:00  --------------------------------------------------------  IN: 643 mL / OUT: 775 mL / NET: -132 mL        PHYSICAL EXAM:  Vital Signs Last 24 Hrs  T(C): 36.3 (29 Apr 2025 05:00), Max: 37 (28 Apr 2025 16:00)  T(F): 97.4 (29 Apr 2025 05:00), Max: 98.6 (28 Apr 2025 16:00)  HR: 65 (29 Apr 2025 05:00) (62 - 65)  BP: 126/64 (29 Apr 2025 05:00) (124/54 - 126/68)  BP(mean): --  RR: 17 (29 Apr 2025 05:00) (17 - 18)  SpO2: 100% (29 Apr 2025 05:00) (99% - 100%)    Parameters below as of 29 Apr 2025 05:00  Patient On (Oxygen Delivery Method): room air      CONSTITUTIONAL: NAD   EYES: conjunctiva and sclera clear  ENMT: Moist oral mucosa. Palate lesion noted   NECK: Supple   RESPIRATORY: Normal respiratory effort; lungs are clear to auscultation bilaterally  CARDIOVASCULAR: Regular rate and rhythm, normal S1 and S2, no murmur/rub/gallop; Peripheral pulses are 2+ bilaterally  ABDOMEN: Nontender to palpation, normoactive bowel sounds, no rebound/guarding   MUSCULOSKELETAL: No lower extremity edema   PSYCH: A+O to person, place, and time; affect appropriate  NEUROLOGY: moves all extremities. Patient had one episode of altered mental status during interview. However, able to follow commands during this episode (able to squeeze my hand b/l). Neuro exam otherwise, WNL   SKIN: No rashes    LABS:                        12.1   9.28  )-----------( 194      ( 28 Apr 2025 04:00 )             38.8     04-28    144  |  110[H]  |  19  ----------------------------<  104[H]  3.6   |  22  |  0.92    Ca    8.9      28 Apr 2025 04:00  Phos  3.8     04-28  Mg     1.90     04-28            Urinalysis Basic - ( 28 Apr 2025 04:00 )    Color: x / Appearance: x / SG: x / pH: x  Gluc: 104 mg/dL / Ketone: x  / Bili: x / Urobili: x   Blood: x / Protein: x / Nitrite: x   Leuk Esterase: x / RBC: x / WBC x   Sq Epi: x / Non Sq Epi: x / Bacteria: x            COORDINATION OF CARE:  Communication: discussed plan of care with ACP 
Source: patient and Chart    HPI:  This is a 69 year old woman with a pmh of cryptogenic organizing pneumonia on steroids (pred 10 daily x 1 month), JASEN, hx syncope (when having blood drawn ~1yr ago) who presents with a syncopal episode while at her pulmonologist office having blood drawn.    Patient admitted to having lightheaded and generally weak prior to having LOC for a few seconds. She stated that she is able to see and heard everything but is unable to communicate or move. Patient denied fever, chills, diaphoresis, HA, CP, palpitation, orthopnea, SOB, abdominal pain, n/v/d, numbness, tingling, rinary incontinence, loss of bowel movement, tremors, and hx of atrial arrhythmia. Based on the H&p, patient had multiple subsequent events in the ED. While in imaging she reports she had another syncopal episode and when she came to she felt like her jaw was becoming tense and she was unable to talk. These symptoms resolved with massaging of her TMJ bilaterally. She also admitted to leg cramps. Per Pulmonology note, "Patient had a brief vasovagal event while drawing blood in sitting position. She was stabilized, alert and awake a few minutes later. BP was 190/90.She had 1 cup of apple juice. We then transferred her to supine position. She had more apple juice. She felt better and we tried to draw blood again, but were unable to get any blood. It appears pt is dehydrated. After needle was withdrawn, pt fainted again in supine position.SpO2 98%, /93, HR fluctuating between 59-79bpm"    Neurology was consulted for possible CVA. CTH with sensecent changes and microvascular disease.  CTA/CTP no core/penumbra. Short segment severe stenosis of the V1 segment of the left vertebral artery MICU was consulted s/p POCUS which suggested that patient was hypovolemic. VEEG showed no  no epileptiform abnormalities or seizures. MR brain completed on 4/29, report and imaging reviewed. 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 Neurology recommended ILR. TTE showed normal LV systolic functionEF 69 %, mild LV diastolic dysfunction, normal RV systolic function, and mild MR. EKG and telemetry showed SR.  Ep was consulted for ILR prolonged monitoring for detection of cardiac arrhythmia as cause of potential cardioembolic source and sycnope.  The risks and benefits were explained in detail which included but not limited to bleeding, infection, stroke, syncope 2/2 vasovagal, intubation, and death. Patient expressed understanding and all questions were answered. Patient is consented for ILR.        PAST MEDICAL & SURGICAL HISTORY:  Cryptogenic Organizing Pneumonia  Iron Deficiency Anemia  No significant past surgical history    MEDICATIONS  (STANDING):  chlorhexidine 2% Cloths 1 Application(s) Topical daily  dextrose 5% + sodium chloride 0.9%. 1000 milliLiter(s) (75 mL/Hr) IV Continuous <Continuous>  enoxaparin Injectable 40 milliGRAM(s) SubCutaneous every 24 hours  influenza  Vaccine (HIGH DOSE) 0.5 milliLiter(s) IntraMuscular once  pantoprazole    Tablet 40 milliGRAM(s) Oral before breakfast  predniSONE   Tablet 30 milliGRAM(s) Oral daily    MEDICATIONS  (PRN):  acetaminophen     Tablet .. 650 milliGRAM(s) Oral every 6 hours PRN Temp greater or equal to 38C (100.4F), Mild Pain (1 - 3)  aluminum hydroxide/magnesium hydroxide/simethicone Suspension 30 milliLiter(s) Oral every 4 hours PRN Dyspepsia  melatonin 3 milliGRAM(s) Oral at bedtime PRN Insomnia  ondansetron Injectable 4 milliGRAM(s) IV Push every 8 hours PRN Nausea and/or Vomiting      FAMILY HISTORY:  Mother hx of breast and lung cancer  Father hx of prostate cancer  Sisters HX of DM    SOCIAL HISTORY:  CIGARETTES: Denied  ALCOHOL: denied   ILLICIT DRUG USES: denied    REVIEW OF SYSTEMS:  CONSTITUTIONAL: No fever, weight loss, chills, shakes, or fatigue  EYES: No eye pain, visual disturbances, or discharge  ENMT:  No difficulty hearing, tinnitus, vertigo; No sinus or throat pain  NECK: No pain or stiffness  RESPIRATORY: No cough, wheezing, hemoptysis, or shortness of breath  CARDIOVASCULAR: per HPI  GASTROINTESTINAL: No abdominal  or epigastric pain, nausea, vomiting, hematemesis, diarrhea, constipation, melena or bright red blood.  GENITOURINARY: No dysuria, nocturia, hematuria, or urinary incontinence  NEUROLOGICAL: per HPI  MUSCULOSKELETAL: No joint pain or swelling, muscle, back, or extremity pain  PSYCHIATRIC: No depression, anxiety, or difficulty sleeping        Vital Signs Last 24 Hrs  T(C): 36.7 (30 Apr 2025 05:00), Max: 36.7 (29 Apr 2025 13:00)  T(F): 98 (30 Apr 2025 05:00), Max: 98 (29 Apr 2025 13:00)  HR: 73 (30 Apr 2025 07:38) (60 - 73)  BP: 128/74 (30 Apr 2025 05:00) (120/57 - 130/63)  BP(mean): --  RR: 17 (30 Apr 2025 05:00) (16 - 18)  SpO2: 100% (30 Apr 2025 05:00) (98% - 100%)    Parameters below as of 30 Apr 2025 05:00  Patient On (Oxygen Delivery Method): room air        PHYSICAL EXAM:  GENERAL: Well appearing, speaking in full sentence, in NAD  HEART: S1S2 RRR  PULMONARY:CTABL, normal respiratory effort  ABDOMEN: Bowel sounds present, soft  EXTREMITIES:  Warm, well -perfused, no pedal edema, distal pulses present  NEUROLOGICAL:AOx3   INTERPRETATION OF TELEMETRY: SR HR 60-70bpm    ECG: SR hR 59    I&O's Detail    29 Apr 2025 07:01  -  30 Apr 2025 07:00  --------------------------------------------------------  IN:    Oral Fluid: 400 mL  Total IN: 400 mL    OUT:    Voided (mL): 900 mL  Total OUT: 900 mL    Total NET: -500 mL          LABS:                        12.4   10.42 )-----------( 178      ( 30 Apr 2025 05:00 )             40.6     04-30    140  |  103  |  18  ----------------------------<  75  3.8   |  23  |  0.82    Ca    9.3      30 Apr 2025 05:00  Phos  3.5     04-30  Mg     1.80     04-30            Urinalysis Basic - ( 30 Apr 2025 05:00 )    Color: x / Appearance: x / SG: x / pH: x  Gluc: 75 mg/dL / Ketone: x  / Bili: x / Urobili: x   Blood: x / Protein: x / Nitrite: x   Leuk Esterase: x / RBC: x / WBC x   Sq Epi: x / Non Sq Epi: x / Bacteria: x      BNP  I&O's Detail    29 Apr 2025 07:01  -  30 Apr 2025 07:00  --------------------------------------------------------  IN:    Oral Fluid: 400 mL  Total IN: 400 mL    OUT:    Voided (mL): 900 mL  Total OUT: 900 mL    Total NET: -500 mL        Daily     Daily     RADIOLOGY & ADDITIONAL STUDIES:    < from: CT Neck Soft Tissue w/ IV Cont (04.25.25 @ 14:48) >  IMPRESSION:    CT PERFUSION:  Technical limitations: None.    Core infarction: 0 ml  Penumbra / tissue at risk for active ischemia: 0 ml    CTA NECK:  Short segment severe stenosis of the V1 segment of the left vertebral   artery. Otherwise, no evidence of significant stenosis or occlusion.    CTA HEAD:  No large vessel occlusion, significant stenosis or vascular abnormality   identified.    CT SOFT TISSUE NECK:  No cervical mass or adenopathy identified.    < end of copied text >  < from: US Duplex Venous Lower Ext Ltd, Left (04.29.25 @ 11:23) >  IMPRESSION:  No evidence of left lower extremity deep venous thrombosis.    < end of copied text >  < from: US Duplex Venous Lower Ext Ltd, Right (04.28.25 @ 14:08) >  IMPRESSION:  No evidence of right lower extremity deep venous thrombosis.    < end of copied text >    < from: MR Head w/wo IV Cont (04.29.25 @ 18:50) >      IMPRESSION:  No MRI evidence of acute intracranial pathology.  No mass or abnormal   intracranial contrast enhancement.    A few scattered small round punctate foci of T2 FLAIR signal   hyperintensity in the periventricular and subcortical white white matter   are nonspecific in etiology, but are consistent with mild   microvascular-type changes.  The differential diagnosis includes   postinfectious/postinflammatory lesions, migraines, and Lyme disease.    Small chronic infarct in the left cerebellar hemisphere.    < end of copied text >  < from: TTE W or WO Ultrasound Enhancing Agent (04.29.25 @ 12:04) >  CONCLUSIONS:      1. Left ventricular cavity is small. Left ventricular wall thickness is normal. Left ventricular systolic function is normal with an ejection fraction of 69 % by 3D. There are no regional wall motion abnormalities seen.   2. There is mild (grade 1) left ventricular diastolic dysfunction.   3. Left ventricular global longitudinal strain is -20.6 % which is normal (< -18%). Images were acquired on a [employer] ultrasound system and processed on the ultrasound machine with a heart rate of 53 bpm and a blood pressure of 126/64 mmHg.   4. Normal right ventricular cavity size and normal right ventricular systolic function. Tricuspid annular plane systolic excursion (TAPSE) is 1.9 cm (normal >=1.7 cm).   5. Structurally normal mitral valve with normal leaflet excursion. There is calcification of the mitral valve annulus. There is mild mitral regurgitation.    < end of copied text >    
Neurology Progress Note    SUBJECTIVE/OBJECTIVE/INTERVAL EVENTS: Patient seen and examined at bedside w/ neuro attending and team.     INTERVAL HISTORY: Patient reports 2 episodes of syncope since yesterday, but no more lockjaw. Endorses pain in her right TMJ.    REVIEW OF SYSTEMS: Otherwise denies fever, chills, headaches, vision changes, nausea, vomiting, hearing change, focal weakness, focal numbness, bowel/ bladder incontinence. Few questions of a 10-system ROS was performed and is negative except for those items noted above and/or in the HPI.    VITALS & EXAMINATION:  Vital Signs Last 24 Hrs  T(C): 37.1 (26 Apr 2025 07:30), Max: 37.1 (26 Apr 2025 07:30)  T(F): 98.7 (26 Apr 2025 07:30), Max: 98.7 (26 Apr 2025 07:30)  HR: 62 (26 Apr 2025 07:30) (55 - 83)  BP: 120/84 (26 Apr 2025 07:30) (120/84 - 164/91)  BP(mean): --  RR: 17 (26 Apr 2025 07:30) (16 - 18)  SpO2: 100% (26 Apr 2025 07:30) (97% - 100%)    Parameters below as of 26 Apr 2025 07:30  Patient On (Oxygen Delivery Method): room air        - Mental Status:   level of consciousness: Awake, alert  Orientation: Oriented to person, age, place, and time  Language: Speech is fluent with intact naming, repetition, and ability to follow commands (including simple commands and complex cross commands)    - Cranial Nerves:   PERRL, VFF, EOMI, facial sensation is intact in the V1-V3 distribution bilaterally; face is symmetric with coco smile    - Motor Testing:  Bulk: Normal   Tone: Normal  Strength:  There is no pronator drift b/l  There is no leg drift b/l though during right leg raise she develops painful cramp      - Gait: not tested due to recurrent frequent LoC      LABORATORY:  CBC                       13.6   10.32 )-----------( 218      ( 26 Apr 2025 06:10 )             43.7     Chem 04-26    143  |  106  |  7   ----------------------------<  81  3.7   |  25  |  0.87    Ca    9.2      26 Apr 2025 06:10  Phos  3.0     04-26  Mg     1.80     04-26    TPro  7.6  /  Alb  4.1  /  TBili  0.4  /  DBili  x   /  AST  16  /  ALT  25  /  AlkPhos  83  04-25    LFTs LIVER FUNCTIONS - ( 25 Apr 2025 14:50 )  Alb: 4.1 g/dL / Pro: 7.6 g/dL / ALK PHOS: 83 U/L / ALT: 25 U/L / AST: 16 U/L / GGT: x           Coagulopathy PT/INR - ( 25 Apr 2025 14:50 )   PT: 10.8 sec;   INR: 0.93 ratio         PTT - ( 25 Apr 2025 14:50 )  PTT:29.5 sec  Lipid Panel 04-26 Chol 218[H] LDL -- HDL 75 Trig 79  A1c   Cardiac enzymes     U/A Urinalysis Basic - ( 26 Apr 2025 06:10 )    Color: x / Appearance: x / SG: x / pH: x  Gluc: 81 mg/dL / Ketone: x  / Bili: x / Urobili: x   Blood: x / Protein: x / Nitrite: x   Leuk Esterase: x / RBC: x / WBC x   Sq Epi: x / Non Sq Epi: x / Bacteria: x      CSF  Immunological  Other    STUDIES & IMAGING: (EEG, CT, MR, U/S, TTE/TONE):
America Corona MD  [facility] Division of Hospital Medicine  Pager: [phone]  Available via MS Teams    SUBJECTIVE / OVERNIGHT EVENTS:    patient feels well this morning   no new complaints  periodically does have episodes of AMS but improving     MEDICATIONS  (STANDING):  aspirin enteric coated 81 milliGRAM(s) Oral daily  atorvastatin 40 milliGRAM(s) Oral at bedtime  chlorhexidine 2% Cloths 1 Application(s) Topical daily  dextrose 5% + sodium chloride 0.9%. 1000 milliLiter(s) (75 mL/Hr) IV Continuous <Continuous>  enoxaparin Injectable 40 milliGRAM(s) SubCutaneous every 24 hours  influenza  Vaccine (HIGH DOSE) 0.5 milliLiter(s) IntraMuscular once  pantoprazole    Tablet 40 milliGRAM(s) Oral before breakfast  predniSONE   Tablet 30 milliGRAM(s) Oral daily    MEDICATIONS  (PRN):  acetaminophen     Tablet .. 650 milliGRAM(s) Oral every 6 hours PRN Temp greater or equal to 38C (100.4F), Mild Pain (1 - 3)  aluminum hydroxide/magnesium hydroxide/simethicone Suspension 30 milliLiter(s) Oral every 4 hours PRN Dyspepsia  melatonin 3 milliGRAM(s) Oral at bedtime PRN Insomnia  ondansetron Injectable 4 milliGRAM(s) IV Push every 8 hours PRN Nausea and/or Vomiting      I&O's Summary    29 Apr 2025 07:01  -  30 Apr 2025 07:00  --------------------------------------------------------  IN: 400 mL / OUT: 900 mL / NET: -500 mL        PHYSICAL EXAM:  Vital Signs Last 24 Hrs  T(C): 36.7 (30 Apr 2025 05:00), Max: 36.7 (30 Apr 2025 02:00)  T(F): 98 (30 Apr 2025 05:00), Max: 98 (30 Apr 2025 02:00)  HR: 73 (30 Apr 2025 07:38) (61 - 73)  BP: 128/74 (30 Apr 2025 05:00) (125/78 - 130/63)  BP(mean): --  RR: 17 (30 Apr 2025 05:00) (17 - 18)  SpO2: 100% (30 Apr 2025 05:00) (100% - 100%)    Parameters below as of 30 Apr 2025 05:00  Patient On (Oxygen Delivery Method): room air      CONSTITUTIONAL: NAD   EYES: conjunctiva and sclera clear  ENMT: Moist oral mucosa   NECK: Supple   RESPIRATORY: Normal respiratory effort; lungs are clear to auscultation bilaterally  CARDIOVASCULAR: Regular rate and rhythm, normal S1 and S2, no murmur/rub/gallop; Peripheral pulses are 2+ bilaterally  ABDOMEN: Nontender to palpation, normoactive bowel sounds, no rebound/guarding   MUSCULOSKELETAL: No lower extremity edema   PSYCH: A+O to person, place, and time; affect appropriate  NEUROLOGY: moves all extremities   SKIN: No rashes    LABS:                        12.4   10.42 )-----------( 178      ( 30 Apr 2025 05:00 )             40.6     04-30    140  |  103  |  18  ----------------------------<  75  3.8   |  23  |  0.82    Ca    9.3      30 Apr 2025 05:00  Phos  3.5     04-30  Mg     1.80     04-30            Urinalysis Basic - ( 30 Apr 2025 05:00 )    Color: x / Appearance: x / SG: x / pH: x  Gluc: 75 mg/dL / Ketone: x  / Bili: x / Urobili: x   Blood: x / Protein: x / Nitrite: x   Leuk Esterase: x / RBC: x / WBC x   Sq Epi: x / Non Sq Epi: x / Bacteria: x            COORDINATION OF CARE:  Communication: discussed plan of care with ACP 
Kimo Thomas M.D.  Division of Hospital Medicine   Available via MS Teams    Patient is a 69y old  Female who presents with a chief complaint of syncope (27 Apr 2025 11:49)      SUBJECTIVE / OVERNIGHT EVENTS:  No new complaints. No syncopal episodes.    ADDITIONAL REVIEW OF SYSTEMS:    MEDICATIONS  (STANDING):  dextrose 5% + sodium chloride 0.9%. 1000 milliLiter(s) (75 mL/Hr) IV Continuous <Continuous>  enoxaparin Injectable 40 milliGRAM(s) SubCutaneous every 24 hours  influenza  Vaccine (HIGH DOSE) 0.5 milliLiter(s) IntraMuscular once  pantoprazole    Tablet 40 milliGRAM(s) Oral before breakfast  predniSONE   Tablet 30 milliGRAM(s) Oral daily    MEDICATIONS  (PRN):  acetaminophen     Tablet .. 650 milliGRAM(s) Oral every 6 hours PRN Temp greater or equal to 38C (100.4F), Mild Pain (1 - 3)  aluminum hydroxide/magnesium hydroxide/simethicone Suspension 30 milliLiter(s) Oral every 4 hours PRN Dyspepsia  melatonin 3 milliGRAM(s) Oral at bedtime PRN Insomnia  ondansetron Injectable 4 milliGRAM(s) IV Push every 8 hours PRN Nausea and/or Vomiting      CAPILLARY BLOOD GLUCOSE      POCT Blood Glucose.: 107 mg/dL (27 Apr 2025 17:47)  POCT Blood Glucose.: 112 mg/dL (27 Apr 2025 11:59)  POCT Blood Glucose.: 94 mg/dL (27 Apr 2025 08:24)  POCT Blood Glucose.: 88 mg/dL (26 Apr 2025 21:28)    I&O's Summary    27 Apr 2025 07:01  -  27 Apr 2025 20:05  --------------------------------------------------------  IN: 0 mL / OUT: 1300 mL / NET: -1300 mL        PHYSICAL EXAM:  Vital Signs Last 24 Hrs  T(C): 36.6 (27 Apr 2025 18:15), Max: 37.1 (27 Apr 2025 12:00)  T(F): 97.9 (27 Apr 2025 18:15), Max: 98.7 (27 Apr 2025 12:00)  HR: 91 (27 Apr 2025 18:15) (69 - 91)  BP: 154/93 (27 Apr 2025 18:15) (125/61 - 154/93)  BP(mean): --  RR: 16 (27 Apr 2025 18:15) (16 - 18)  SpO2: 97% (27 Apr 2025 18:15) (97% - 99%)    Parameters below as of 27 Apr 2025 18:15  Patient On (Oxygen Delivery Method): room air        CONSTITUTIONAL: NAD, well-developed  RESPIRATORY: Normal respiratory effort; lungs are clear to auscultation bilaterally  CARDIOVASCULAR: Regular rate and rhythm, normal S1 and S2, no murmur/rub/gallop; No lower extremity edema; Peripheral pulses are 2+ bilaterally  ABDOMEN: Nontender to palpation, normoactive bowel sounds, no rebound/guarding; No hepatosplenomegaly  MUSCLOSKELETAL: no clubbing or cyanosis of digits; no joint swelling or tenderness to palpation  PSYCH: A+O to person, place, and time; affect appropriate    LABS:                        13.1   5.01  )-----------( 192      ( 27 Apr 2025 12:01 )             41.3     04-27    141  |  108[H]  |  9   ----------------------------<  120[H]  3.9   |  24  |  0.80    Ca    9.3      27 Apr 2025 12:01  Phos  3.0     04-27  Mg     2.00     04-27            Urinalysis Basic - ( 27 Apr 2025 12:01 )    Color: x / Appearance: x / SG: x / pH: x  Gluc: 120 mg/dL / Ketone: x  / Bili: x / Urobili: x   Blood: x / Protein: x / Nitrite: x   Leuk Esterase: x / RBC: x / WBC x   Sq Epi: x / Non Sq Epi: x / Bacteria: x        Urinalysis with Rflx Culture (collected 25 Apr 2025 15:54)    Culture - Urine (collected 25 Apr 2025 14:20)  Source: Clean Catch Clean Catch (Midstream)  Final Report (27 Apr 2025 02:04):    <10,000 CFU/mL Normal Urogenital Khloe    Culture - Blood (collected 25 Apr 2025 12:00)  Source: Blood Blood-Peripheral  Preliminary Report (27 Apr 2025 19:01):    No growth at 48 Hours    Culture - Blood (collected 25 Apr 2025 11:50)  Source: Blood Blood-Peripheral  Preliminary Report (27 Apr 2025 19:01):    No growth at 48 Hours        RADIOLOGY & ADDITIONAL TESTS:  Results Reviewed:   Imaging Personally Reviewed:  Electrocardiogram Personally Reviewed:    COORDINATION OF CARE:  Care Discussed with Consultants/Other Providers [Y/N]:  Prior or Outpatient Records Reviewed [Y/N]:  
Kimo Thomas M.D.  Division of Hospital Medicine   Available via MS Teams    Patient is a 69y old  Female who presents with a chief complaint of syncope (26 Apr 2025 09:32)      SUBJECTIVE / OVERNIGHT EVENTS:  Syncopal episode in floor during lunch.  Appears WNL during my encounter.  No CP, dizziness, SOB. No HA. AAOX3.  No seizure activity.    ADDITIONAL REVIEW OF SYSTEMS:    MEDICATIONS  (STANDING):  dextrose 5% + sodium chloride 0.9%. 1000 milliLiter(s) (75 mL/Hr) IV Continuous <Continuous>  enoxaparin Injectable 40 milliGRAM(s) SubCutaneous every 24 hours  influenza  Vaccine (HIGH DOSE) 0.5 milliLiter(s) IntraMuscular once  pantoprazole    Tablet 40 milliGRAM(s) Oral before breakfast  predniSONE   Tablet 30 milliGRAM(s) Oral daily    MEDICATIONS  (PRN):  acetaminophen     Tablet .. 650 milliGRAM(s) Oral every 6 hours PRN Temp greater or equal to 38C (100.4F), Mild Pain (1 - 3)  aluminum hydroxide/magnesium hydroxide/simethicone Suspension 30 milliLiter(s) Oral every 4 hours PRN Dyspepsia  melatonin 3 milliGRAM(s) Oral at bedtime PRN Insomnia  ondansetron Injectable 4 milliGRAM(s) IV Push every 8 hours PRN Nausea and/or Vomiting      CAPILLARY BLOOD GLUCOSE      POCT Blood Glucose.: 121 mg/dL (26 Apr 2025 11:27)  POCT Blood Glucose.: 79 mg/dL (26 Apr 2025 03:11)  POCT Blood Glucose.: 118 mg/dL (25 Apr 2025 21:06)  POCT Blood Glucose.: 74 mg/dL (25 Apr 2025 20:09)  POCT Blood Glucose.: 87 mg/dL (25 Apr 2025 19:12)  POCT Blood Glucose.: 131 mg/dL (25 Apr 2025 18:04)  POCT Blood Glucose.: 94 mg/dL (25 Apr 2025 16:56)    I&O's Summary      PHYSICAL EXAM:  Vital Signs Last 24 Hrs  T(C): 36.8 (26 Apr 2025 11:15), Max: 37.1 (26 Apr 2025 07:30)  T(F): 98.2 (26 Apr 2025 11:15), Max: 98.7 (26 Apr 2025 07:30)  HR: 66 (26 Apr 2025 11:15) (55 - 83)  BP: 135/59 (26 Apr 2025 11:15) (120/84 - 154/86)  BP(mean): 99 (26 Apr 2025 10:40) (95 - 101)  RR: 18 (26 Apr 2025 11:15) (16 - 20)  SpO2: 97% (26 Apr 2025 11:15) (96% - 100%)    Parameters below as of 26 Apr 2025 11:15  Patient On (Oxygen Delivery Method): room air        CONSTITUTIONAL: NAD, well-developed  RESPIRATORY: Normal respiratory effort; lungs are clear to auscultation bilaterally  CARDIOVASCULAR: Regular rate and rhythm, normal S1 and S2, no murmur/rub/gallop; No lower extremity edema; Peripheral pulses are 2+ bilaterally  ABDOMEN: Nontender to palpation, normoactive bowel sounds, no rebound/guarding; No hepatosplenomegaly  MUSCLOSKELETAL: no clubbing or cyanosis of digits; no joint swelling or tenderness to palpation  PSYCH: A+O to person, place, and time; affect appropriate    LABS:                        13.6   10.32 )-----------( 218      ( 26 Apr 2025 06:10 )             43.7     04-26    143  |  106  |  7   ----------------------------<  81  3.7   |  25  |  0.87    Ca    9.2      26 Apr 2025 06:10  Phos  3.0     04-26  Mg     1.80     04-26    TPro  7.6  /  Alb  4.1  /  TBili  0.4  /  DBili  x   /  AST  16  /  ALT  25  /  AlkPhos  83  04-25    PT/INR - ( 25 Apr 2025 14:50 )   PT: 10.8 sec;   INR: 0.93 ratio         PTT - ( 25 Apr 2025 14:50 )  PTT:29.5 sec      Urinalysis Basic - ( 26 Apr 2025 06:10 )    Color: x / Appearance: x / SG: x / pH: x  Gluc: 81 mg/dL / Ketone: x  / Bili: x / Urobili: x   Blood: x / Protein: x / Nitrite: x   Leuk Esterase: x / RBC: x / WBC x   Sq Epi: x / Non Sq Epi: x / Bacteria: x        Urinalysis with Rflx Culture (collected 25 Apr 2025 15:54)        RADIOLOGY & ADDITIONAL TESTS:  Results Reviewed:   Imaging Personally Reviewed:  Electrocardiogram Personally Reviewed:    COORDINATION OF CARE:  Care Discussed with Consultants/Other Providers [Y/N]:  Prior or Outpatient Records Reviewed [Y/N]:  
America Corona MD  [facility] Division of Hospital Medicine  Pager: [phone]  Available via MS Teams    SUBJECTIVE / OVERNIGHT EVENTS:    patient's sister at bedside during interview   patient states she had another "syncopal" episode yesterday   during the interview patient again had an episode where she stares off into space. Maintains tone. No jerking movements. Somewhat responds to pinching that her sister does. The episode lasts for 1-2 minutes and patient states she remembers the episodes but is unable to speak or communicate until the episode is over. Patient states she has a headache, numbness in the upper extremities, and some jaw numbness during this time. Denies chest pain, true loss of consciousness, or any other symptoms    MEDICATIONS  (STANDING):  dextrose 5% + sodium chloride 0.9%. 1000 milliLiter(s) (75 mL/Hr) IV Continuous <Continuous>  enoxaparin Injectable 40 milliGRAM(s) SubCutaneous every 24 hours  influenza  Vaccine (HIGH DOSE) 0.5 milliLiter(s) IntraMuscular once  pantoprazole    Tablet 40 milliGRAM(s) Oral before breakfast  predniSONE   Tablet 30 milliGRAM(s) Oral daily    MEDICATIONS  (PRN):  acetaminophen     Tablet .. 650 milliGRAM(s) Oral every 6 hours PRN Temp greater or equal to 38C (100.4F), Mild Pain (1 - 3)  aluminum hydroxide/magnesium hydroxide/simethicone Suspension 30 milliLiter(s) Oral every 4 hours PRN Dyspepsia  melatonin 3 milliGRAM(s) Oral at bedtime PRN Insomnia  ondansetron Injectable 4 milliGRAM(s) IV Push every 8 hours PRN Nausea and/or Vomiting      I&O's Summary    27 Apr 2025 07:01  -  28 Apr 2025 07:00  --------------------------------------------------------  IN: 461 mL / OUT: 2170 mL / NET: -1709 mL        PHYSICAL EXAM:  Vital Signs Last 24 Hrs  T(C): 36.6 (28 Apr 2025 12:00), Max: 37.1 (28 Apr 2025 08:00)  T(F): 97.8 (28 Apr 2025 12:00), Max: 98.8 (28 Apr 2025 08:00)  HR: 79 (28 Apr 2025 12:00) (71 - 91)  BP: 135/64 (28 Apr 2025 12:00) (114/64 - 154/93)  BP(mean): --  RR: 18 (28 Apr 2025 12:00) (16 - 18)  SpO2: 99% (28 Apr 2025 12:00) (97% - 100%)    Parameters below as of 28 Apr 2025 12:00  Patient On (Oxygen Delivery Method): room air      CONSTITUTIONAL: NAD, on EEG machines   EYES: conjunctiva and sclera clear  ENMT: Moist oral mucosa   NECK: Supple   RESPIRATORY: Normal respiratory effort; lungs are clear to auscultation bilaterally  CARDIOVASCULAR: Regular rate and rhythm, normal S1 and S2, no murmur/rub/gallop; Peripheral pulses are 2+ bilaterally  ABDOMEN: Nontender to palpation, normoactive bowel sounds, no rebound/guarding   MUSCULOSKELETAL: No lower extremity edema   PSYCH: A+O to person, place, and time; affect appropriate  NEUROLOGY: moves all extremities, no focal deficits. Had a episode, described as above during the interview.   SKIN: No rashes    LABS:                        12.1   9.28  )-----------( 194      ( 28 Apr 2025 04:00 )             38.8     04-28    144  |  110[H]  |  19  ----------------------------<  104[H]  3.6   |  22  |  0.92    Ca    8.9      28 Apr 2025 04:00  Phos  3.8     04-28  Mg     1.90     04-28            Urinalysis Basic - ( 28 Apr 2025 04:00 )    Color: x / Appearance: x / SG: x / pH: x  Gluc: 104 mg/dL / Ketone: x  / Bili: x / Urobili: x   Blood: x / Protein: x / Nitrite: x   Leuk Esterase: x / RBC: x / WBC x   Sq Epi: x / Non Sq Epi: x / Bacteria: x        Urinalysis with Rflx Culture (collected 25 Apr 2025 15:54)    Culture - Urine (collected 25 Apr 2025 14:20)  Source: Clean Catch Clean Catch (Midstream)  Final Report (27 Apr 2025 02:04):    <10,000 CFU/mL Normal Urogenital Khloe          COORDINATION OF CARE:  Communication: discussed plan of care with ACP

## 2025-04-30 NOTE — PHYSICAL THERAPY INITIAL EVALUATION ADULT - GAIT DISTANCE, PT EVAL
during ambulation Pt complained of shortness of breath oxygen saturation decreased to 89% on rom air, recovered to 93% with standing rest and pursed lip breathing exercises, RN, Judi SIMMONS, made aware. Pt stated that this typically happens when she ambulates/150 feet
no

## 2025-04-30 NOTE — PHYSICAL THERAPY INITIAL EVALUATION ADULT - GENERAL OBSERVATIONS, REHAB EVAL
Pt received semi-supine, +monitor lines,  +bed alarm, all lines/tubes intact, NAD. HR: 67 beats per minute, oxygen saturation: 95%

## 2025-04-30 NOTE — PROGRESS NOTE ADULT - PROBLEM SELECTOR PLAN 1
- CT head: No evidence of acute intracranial pathology.  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 Otherwise, no evidence of significant stenosis or occlusion.  - UA/Utox negative  - EKG: nonischemic , troponin negative x 3  - TTE pending   - continue tele. No sig events thus far   - EEG started, patient has had multiple episodes on EEG, and each episode has not been c/w with seizure thus far    - MRI brain with contrast ordered   - continue neurology follow up
- patient has periodic episodes of staring into space, able to listen and understand, but not able to speak. She can follow commands during this time period. Lasts for 30 second to 2 minutes and spontaneously resolves. Unlikely syncope given patient has consciousness through this episode. Unlikely seizure as EEG is negative (had episodes on EEG). Complex migraine? PNES?  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 Otherwise, no evidence of significant stenosis or occlusion.  - UA/Utox negative  - EKG: nonischemic, troponin negative x 3  - EEG without any seizures  - TTE pending   - MRI brain with contrast ordered   - continue tele. No sig events thus far   - continue neurology follow up
Imaging as above, UA/Utox negative  No prior TTE on file   EKG: nonischemic , troponin <6  TTE ordered, trend trop, monitor on tele.  EEG   Neurology consulted. ? Further brain imaging   Hypoglycemia workup ordered in ED- pending
Imaging as above, UA/Utox negative  EKG: nonischemic , troponin <6  TTE ordered, trend trop, monitor on tele.  EEG   Neurology consulted. ? Further brain imaging   Monitor on Tele
- patient has periodic episodes of staring into space, able to listen and understand, but not able to speak. She can follow commands during this time period. Lasts for 30 second to 2 minutes and spontaneously resolves. Unlikely syncope given patient has consciousness through this episode.   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 Otherwise, no evidence of significant stenosis or occlusion.  - UA/Utox negative  - EKG: nonischemic, troponin negative x 3  - EEG without any seizures  - TTE: LVEF 69%, G1DD, normal RV size and function, no sig contributory valvular disease   - MRI brain with contrast showed  no MRI evidence of acute intracranial pathology.  No mass or abnormal intracranial contrast enhancement.  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 Lyme serology sent off and fill be followed up by neurology.   - differential is broad. Patient unlikely has seizure as EEG is negative (had episodes on EEG). Could be Complex migraine, PNES. Will r/o lyme. Per neurology, plan will be to repeat MRI as OP and track changes over time.

## 2025-04-30 NOTE — PHYSICAL THERAPY INITIAL EVALUATION ADULT - ORIENTATION, REHAB EVAL
oriented to person, place, time and situation
EOAE (evoked otoacoustic emission)

## 2025-04-30 NOTE — PROGRESS NOTE ADULT - TIME BILLING
review of labs, imaging, notes, discussion of plan with patient, family, and consultant
Time-based billing (NON-critical care).     More than 50% of the visit was spent counseling and / or coordinating care by the attending physician.  The necessity of the time spent during the encounter on this date of service was due to:     review of laboratory data, radiology results, consultants' recommendations, documentation in [place], discussion with patient/ACP and interdisciplinary staff (such as , social workers, etc). Interventions were performed as documented above.
review of labs, imaging, notes, discussion of plan with patient, family, and consultant
Time-based billing (NON-critical care).     More than 50% of the visit was spent counseling and / or coordinating care by the attending physician.  The necessity of the time spent during the encounter on this date of service was due to:     review of laboratory data, radiology results, consultants' recommendations, documentation in [place], discussion with patient/ACP and interdisciplinary staff (such as , social workers, etc). Interventions were performed as documented above.
Time-based billing (NON-critical care).     More than 50% of the visit was spent counseling and / or coordinating care by the attending physician.  The necessity of the time spent during the encounter on this date of service was due to:     review of laboratory data, radiology results, consultants' recommendations, documentation in [place], discussion with patient/ACP and interdisciplinary staff (such as , social workers, etc). Interventions were performed as documented above.

## 2025-04-30 NOTE — DISCHARGE NOTE PROVIDER - NSDCMRMEDTOKEN_GEN_ALL_CORE_FT
Omeprazole 20 MG Oral Capsule Delayed Release: 1 cap(s) orally once a day (in the morning) before breakfast  PredniSONE 10 MG Oral Tablet: 1 tab(s) orally once a day   pantoprazole 40 mg oral delayed release tablet: 1 tab(s) orally once a day (before a meal)  predniSONE 10 mg oral tablet: 3 tab(s) orally once a day please take 3tabs daily until 5/9 starting 5/10 take 2 tabs daily   aspirin 81 mg oral delayed release tablet: 1 tab(s) orally once a day  atorvastatin 40 mg oral tablet: 1 tab(s) orally once a day (at bedtime)  pantoprazole 40 mg oral delayed release tablet: 1 tab(s) orally once a day (before a meal)  predniSONE 10 mg oral tablet: 3 tab(s) orally once a day please take 3tabs daily until 5/9 starting 5/10 take 2 tabs daily

## 2025-04-30 NOTE — PROGRESS NOTE ADULT - PROBLEM SELECTOR PROBLEM 2
Cryptogenic organizing pneumonia

## 2025-04-30 NOTE — DISCHARGE NOTE PROVIDER - NSDCFUADDAPPT_GEN_ALL_CORE_FT
you would need sleep study outpatient.   Followup with Dr Abel pulmonary and Dr Rizzo  Followup with Dr Rosa for continue care monitor for loop recorder you would need sleep study outpatient.   Followup with Dr Abel pulmonary and Dr Rizzo  Followup with Dr Chávez for continue care monitor for loop recorder you would need sleep study outpatient.   Followup with Dr Abel pulmonary and Dr Rizzo  Followup with Dr Chávez for continue care monitor for loop recorder    APPTS ARE READY TO BE MADE: [X] YES    Best Family or Patient Contact (if needed):    Additional Information about above appointments (if needed):    1: Stroke   2: Pulmonology  3: PCP  4. EP     Other comments or requests:    you would need sleep study outpatient.   Followup with Dr Abel pulmonary and Dr Rizzo  Followup with Dr Chávez for continue care monitor for loop recorder    APPTS ARE READY TO BE MADE: [X] YES    Best Family or Patient Contact (if needed):    Additional Information about above appointments (if needed):    1: Stroke   2: Pulmonology  3: PCP  4. EP   5. Heart failure     Other comments or requests:    you would need sleep study outpatient.   Followup with Dr Abel pulmonary and Dr Rizzo  Followup with Dr Chávez for continue care monitor for loop recorder    APPTS ARE READY TO BE MADE: [X] YES    Best Family or Patient Contact (if needed):    Additional Information about above appointments (if needed):    1: Stroke   2: Pulmonology  3: PCP  4. EP   5. Heart failure     Other comments or requests:         Patient was outreached but did not answer nor could a voicemail be left.

## 2025-04-30 NOTE — DISCHARGE NOTE PROVIDER - NSDCACTIVITY_GEN_ALL_CORE
You can return to normal activities 24hours after the procedure   No scrubbing the incision site  No lotion, ointment, powder or direct sunlight to the incision site for 2 weeks   No swimming pool, Jacuzzi, or bath for 7 days.   You can shower 24 hours after procedure. Pat the area dry  Please call [phone] if the following occurs:      - fever with temperature > 101F      - swelling, drainage or bleeding at the site incision.

## 2025-05-01 ENCOUNTER — TRANSCRIPTION ENCOUNTER (OUTPATIENT)
Age: 70
End: 2025-05-01

## 2025-05-01 VITALS
SYSTOLIC BLOOD PRESSURE: 96 MMHG | RESPIRATION RATE: 12 BRPM | HEART RATE: 80 BPM | DIASTOLIC BLOOD PRESSURE: 40 MMHG | OXYGEN SATURATION: 96 %

## 2025-05-01 LAB
ANION GAP SERPL CALC-SCNC: 12 MMOL/L — SIGNIFICANT CHANGE UP (ref 7–14)
B BURGDOR C6 AB SER-ACNC: NEGATIVE — SIGNIFICANT CHANGE UP
B BURGDOR IGG+IGM SER QL IB: SIGNIFICANT CHANGE UP
B BURGDOR IGG+IGM SER-ACNC: 0.13 INDEX — SIGNIFICANT CHANGE UP (ref 0.01–0.9)
BUN SERPL-MCNC: 20 MG/DL — SIGNIFICANT CHANGE UP (ref 7–23)
CALCIUM SERPL-MCNC: 9.3 MG/DL — SIGNIFICANT CHANGE UP (ref 8.4–10.5)
CHLORIDE SERPL-SCNC: 102 MMOL/L — SIGNIFICANT CHANGE UP (ref 98–107)
CO2 SERPL-SCNC: 23 MMOL/L — SIGNIFICANT CHANGE UP (ref 22–31)
CREAT SERPL-MCNC: 0.85 MG/DL — SIGNIFICANT CHANGE UP (ref 0.5–1.3)
EGFR: 75 ML/MIN/1.73M2 — SIGNIFICANT CHANGE UP
EGFR: 75 ML/MIN/1.73M2 — SIGNIFICANT CHANGE UP
GLUCOSE SERPL-MCNC: 84 MG/DL — SIGNIFICANT CHANGE UP (ref 70–99)
HCT VFR BLD CALC: 41.8 % — SIGNIFICANT CHANGE UP (ref 34.5–45)
HGB BLD-MCNC: 13.1 G/DL — SIGNIFICANT CHANGE UP (ref 11.5–15.5)
KAPPA LC SER QL IFE: 1.49 MG/DL — SIGNIFICANT CHANGE UP (ref 0.33–1.94)
KAPPA/LAMBDA FREE LIGHT CHAIN RATIO, SERUM: 1.42 RATIO — SIGNIFICANT CHANGE UP (ref 0.26–1.65)
LAMBDA LC SER QL IFE: 1.05 MG/DL — SIGNIFICANT CHANGE UP (ref 0.57–2.63)
MAGNESIUM SERPL-MCNC: 1.9 MG/DL — SIGNIFICANT CHANGE UP (ref 1.6–2.6)
MCHC RBC-ENTMCNC: 24.3 PG — LOW (ref 27–34)
MCHC RBC-ENTMCNC: 31.3 G/DL — LOW (ref 32–36)
MCV RBC AUTO: 77.7 FL — LOW (ref 80–100)
NRBC # BLD AUTO: 0 K/UL — SIGNIFICANT CHANGE UP (ref 0–0)
NRBC # FLD: 0 K/UL — SIGNIFICANT CHANGE UP (ref 0–0)
NRBC BLD AUTO-RTO: 0 /100 WBCS — SIGNIFICANT CHANGE UP (ref 0–0)
PHOSPHATE SERPL-MCNC: 4 MG/DL — SIGNIFICANT CHANGE UP (ref 2.5–4.5)
PLATELET # BLD AUTO: 216 K/UL — SIGNIFICANT CHANGE UP (ref 150–400)
POTASSIUM SERPL-MCNC: 3.8 MMOL/L — SIGNIFICANT CHANGE UP (ref 3.5–5.3)
POTASSIUM SERPL-SCNC: 3.8 MMOL/L — SIGNIFICANT CHANGE UP (ref 3.5–5.3)
RBC # BLD: 5.38 M/UL — HIGH (ref 3.8–5.2)
RBC # FLD: 14.7 % — HIGH (ref 10.3–14.5)
SODIUM SERPL-SCNC: 137 MMOL/L — SIGNIFICANT CHANGE UP (ref 135–145)
WBC # BLD: 10.95 K/UL — HIGH (ref 3.8–10.5)
WBC # FLD AUTO: 10.95 K/UL — HIGH (ref 3.8–10.5)

## 2025-05-01 PROCEDURE — 33285 INSJ SUBQ CAR RHYTHM MNTR: CPT

## 2025-05-01 PROCEDURE — 99232 SBSQ HOSP IP/OBS MODERATE 35: CPT

## 2025-05-01 RX ADMIN — PREDNISONE 30 MILLIGRAM(S): 20 TABLET ORAL at 06:08

## 2025-05-01 RX ADMIN — Medication 1 APPLICATION(S): at 14:58

## 2025-05-01 RX ADMIN — Medication 40 MILLIGRAM(S): at 06:09

## 2025-05-01 RX ADMIN — Medication 81 MILLIGRAM(S): at 14:56

## 2025-05-01 NOTE — DISCHARGE NOTE NURSING/CASE MANAGEMENT/SOCIAL WORK - PATIENT PORTAL LINK FT
You can access the FollowMyHealth Patient Portal offered by [facility] by registering at the following website: [email or web address]. By joining [employer]’s FollowMyHealth portal, you will also be able to view your health information using other applications (apps) compatible with our system.

## 2025-05-01 NOTE — DISCHARGE NOTE NURSING/CASE MANAGEMENT/SOCIAL WORK - FINANCIAL ASSISTANCE
[facility] provides services at a reduced cost to those who are determined to be eligible through [facility]’s financial assistance program. Information regarding [facility]’s financial assistance program can be found by going to [email or web address] or by calling [phone].

## 2025-05-01 NOTE — DISCHARGE NOTE NURSING/CASE MANAGEMENT/SOCIAL WORK - NSDCFUADDAPPT_GEN_ALL_CORE_FT
you would need sleep study outpatient.   Followup with Dr Abel pulmonary and Dr Rizzo  Followup with Dr Chávez for continue care monitor for loop recorder    APPTS ARE READY TO BE MADE: [X] YES    Best Family or Patient Contact (if needed):    Additional Information about above appointments (if needed):    1: Stroke   2: Pulmonology  3: PCP  4. EP   5. Heart failure     Other comments or requests:         Patient was outreached but did not answer nor could a voicemail be left.

## 2025-05-04 LAB — PTH RELATED PROT SERPL-MCNC: <2 PMOL/L — SIGNIFICANT CHANGE UP

## 2025-05-06 LAB — TETANUS ANTIBODIES IGG: 0.97 IU/ML — SIGNIFICANT CHANGE UP

## 2025-05-12 LAB
% ALBUMIN: 45.7 % — SIGNIFICANT CHANGE UP
% ALPHA 1: 3.4 % — SIGNIFICANT CHANGE UP
% ALPHA 2: 15.8 % — SIGNIFICANT CHANGE UP
% BETA: 15.5 % — SIGNIFICANT CHANGE UP
% GAMMA: 20 % — SIGNIFICANT CHANGE UP
ALBUMIN SERPL ELPH-MCNC: 3.47 G/DL — SIGNIFICANT CHANGE UP (ref 3.3–4.4)
ALBUMIN/GLOB SERPL ELPH: 0.8 RATIO — SIGNIFICANT CHANGE UP
ALPHA1 GLOB SERPL ELPH-MCNC: 0.26 G/DL — SIGNIFICANT CHANGE UP (ref 0.1–0.3)
ALPHA2 GLOB SERPL ELPH-MCNC: 1.2 G/DL — HIGH (ref 0.6–1)
B-GLOBULIN SERPL ELPH-MCNC: 1.18 G/DL — HIGH (ref 0.6–1.1)
GAMMA GLOBULIN: 1.52 G/DL — SIGNIFICANT CHANGE UP (ref 0.7–1.7)
PROT PATTERN SERPL ELPH-IMP: SIGNIFICANT CHANGE UP
PROT SERPL-MCNC: 7.6 G/DL — SIGNIFICANT CHANGE UP (ref 6–8.3)

## 2025-05-13 ENCOUNTER — APPOINTMENT (OUTPATIENT)
Dept: ELECTROPHYSIOLOGY | Facility: CLINIC | Age: 70
End: 2025-05-13
Payer: MEDICARE

## 2025-05-13 ENCOUNTER — APPOINTMENT (OUTPATIENT)
Dept: PULMONOLOGY | Facility: CLINIC | Age: 70
End: 2025-05-13
Payer: MEDICARE

## 2025-05-13 VITALS
DIASTOLIC BLOOD PRESSURE: 66 MMHG | BODY MASS INDEX: 33.79 KG/M2 | WEIGHT: 179 LBS | SYSTOLIC BLOOD PRESSURE: 112 MMHG | HEIGHT: 61 IN | OXYGEN SATURATION: 95 % | HEART RATE: 66 BPM | RESPIRATION RATE: 16 BRPM | TEMPERATURE: 97.2 F

## 2025-05-13 DIAGNOSIS — K21.9 GASTRO-ESOPHAGEAL REFLUX DISEASE W/OUT ESOPHAGITIS: ICD-10-CM

## 2025-05-13 DIAGNOSIS — J84.116 CRYPTOGENIC ORGANIZING PNEUMONIA: ICD-10-CM

## 2025-05-13 DIAGNOSIS — G47.34 IDIOPATHIC SLEEP RELATED NONOBSTRUCTIVE ALVEOLAR HYPOVENTILATION: ICD-10-CM

## 2025-05-13 DIAGNOSIS — R09.02 HYPOXEMIA: ICD-10-CM

## 2025-05-13 PROCEDURE — 99212 OFFICE O/P EST SF 10 MIN: CPT | Mod: 93

## 2025-05-13 PROCEDURE — 99214 OFFICE O/P EST MOD 30 MIN: CPT | Mod: GC

## 2025-05-14 LAB — INTERPRETATION SERPL IFE-IMP: SIGNIFICANT CHANGE UP

## 2025-06-03 ENCOUNTER — NON-APPOINTMENT (OUTPATIENT)
Age: 70
End: 2025-06-03

## 2025-06-05 ENCOUNTER — APPOINTMENT (OUTPATIENT)
Dept: ELECTROPHYSIOLOGY | Facility: CLINIC | Age: 70
End: 2025-06-05
Payer: MEDICARE

## 2025-06-05 ENCOUNTER — NON-APPOINTMENT (OUTPATIENT)
Age: 70
End: 2025-06-05

## 2025-06-05 PROCEDURE — 93298 REM INTERROG DEV EVAL SCRMS: CPT

## 2025-06-12 ENCOUNTER — INPATIENT (INPATIENT)
Facility: HOSPITAL | Age: 70
LOS: 5 days | Discharge: HOME | End: 2025-06-18
Attending: INTERNAL MEDICINE | Admitting: INTERNAL MEDICINE
Payer: MEDICARE

## 2025-06-12 VITALS
SYSTOLIC BLOOD PRESSURE: 146 MMHG | OXYGEN SATURATION: 99 % | TEMPERATURE: 98 F | HEIGHT: 62 IN | DIASTOLIC BLOOD PRESSURE: 88 MMHG | WEIGHT: 182.98 LBS | RESPIRATION RATE: 17 BRPM | HEART RATE: 73 BPM

## 2025-06-12 DIAGNOSIS — J84.116 CRYPTOGENIC ORGANIZING PNEUMONIA: ICD-10-CM

## 2025-06-12 DIAGNOSIS — G93.40 ENCEPHALOPATHY, UNSPECIFIED: ICD-10-CM

## 2025-06-12 DIAGNOSIS — K21.9 GASTRO-ESOPHAGEAL REFLUX DISEASE WITHOUT ESOPHAGITIS: ICD-10-CM

## 2025-06-12 DIAGNOSIS — Z29.9 ENCOUNTER FOR PROPHYLACTIC MEASURES, UNSPECIFIED: ICD-10-CM

## 2025-06-12 DIAGNOSIS — R41.82 ALTERED MENTAL STATUS, UNSPECIFIED: ICD-10-CM

## 2025-06-12 LAB
ALBUMIN SERPL ELPH-MCNC: 4 G/DL — SIGNIFICANT CHANGE UP (ref 3.3–5)
ALP SERPL-CCNC: 76 U/L — SIGNIFICANT CHANGE UP (ref 40–120)
ALT FLD-CCNC: 31 U/L — SIGNIFICANT CHANGE UP (ref 4–33)
ANION GAP SERPL CALC-SCNC: 12 MMOL/L — SIGNIFICANT CHANGE UP (ref 7–14)
ANION GAP SERPL CALC-SCNC: 16 MMOL/L — HIGH (ref 7–14)
APPEARANCE UR: CLEAR — SIGNIFICANT CHANGE UP
APTT BLD: 38.1 SEC — HIGH (ref 26.1–36.8)
AST SERPL-CCNC: 54 U/L — HIGH (ref 4–32)
BASOPHILS # BLD AUTO: 0.06 K/UL — SIGNIFICANT CHANGE UP (ref 0–0.2)
BASOPHILS NFR BLD AUTO: 0.7 % — SIGNIFICANT CHANGE UP (ref 0–2)
BILIRUB SERPL-MCNC: 0.3 MG/DL — SIGNIFICANT CHANGE UP (ref 0.2–1.2)
BILIRUB UR-MCNC: NEGATIVE — SIGNIFICANT CHANGE UP
BUN SERPL-MCNC: 13 MG/DL — SIGNIFICANT CHANGE UP (ref 7–23)
BUN SERPL-MCNC: 14 MG/DL — SIGNIFICANT CHANGE UP (ref 7–23)
CALCIUM SERPL-MCNC: 10.6 MG/DL — HIGH (ref 8.4–10.5)
CALCIUM SERPL-MCNC: 9.8 MG/DL — SIGNIFICANT CHANGE UP (ref 8.4–10.5)
CHLORIDE SERPL-SCNC: 100 MMOL/L — SIGNIFICANT CHANGE UP (ref 98–107)
CHLORIDE SERPL-SCNC: 98 MMOL/L — SIGNIFICANT CHANGE UP (ref 98–107)
CO2 SERPL-SCNC: 21 MMOL/L — LOW (ref 22–31)
CO2 SERPL-SCNC: 22 MMOL/L — SIGNIFICANT CHANGE UP (ref 22–31)
COLOR SPEC: YELLOW — SIGNIFICANT CHANGE UP
CREAT SERPL-MCNC: 1.06 MG/DL — SIGNIFICANT CHANGE UP (ref 0.5–1.3)
CREAT SERPL-MCNC: 1.08 MG/DL — SIGNIFICANT CHANGE UP (ref 0.5–1.3)
DIFF PNL FLD: NEGATIVE — SIGNIFICANT CHANGE UP
EGFR: 56 ML/MIN/1.73M2 — LOW
EGFR: 56 ML/MIN/1.73M2 — LOW
EGFR: 57 ML/MIN/1.73M2 — LOW
EGFR: 57 ML/MIN/1.73M2 — LOW
EOSINOPHIL # BLD AUTO: 0.04 K/UL — SIGNIFICANT CHANGE UP (ref 0–0.5)
EOSINOPHIL NFR BLD AUTO: 0.5 % — SIGNIFICANT CHANGE UP (ref 0–6)
GLUCOSE SERPL-MCNC: 102 MG/DL — HIGH (ref 70–99)
GLUCOSE SERPL-MCNC: 112 MG/DL — HIGH (ref 70–99)
GLUCOSE UR QL: NEGATIVE MG/DL — SIGNIFICANT CHANGE UP
HCT VFR BLD CALC: 47.1 % — HIGH (ref 34.5–45)
HGB BLD-MCNC: 14.2 G/DL — SIGNIFICANT CHANGE UP (ref 11.5–15.5)
IANC: 6.06 K/UL — SIGNIFICANT CHANGE UP (ref 1.8–7.4)
IMM GRANULOCYTES NFR BLD AUTO: 0.5 % — SIGNIFICANT CHANGE UP (ref 0–0.9)
INR BLD: <0.9 RATIO — SIGNIFICANT CHANGE UP (ref 0.85–1.16)
KETONES UR QL: NEGATIVE MG/DL — SIGNIFICANT CHANGE UP
LEUKOCYTE ESTERASE UR-ACNC: NEGATIVE — SIGNIFICANT CHANGE UP
LYMPHOCYTES # BLD AUTO: 1.92 K/UL — SIGNIFICANT CHANGE UP (ref 1–3.3)
LYMPHOCYTES # BLD AUTO: 22.7 % — SIGNIFICANT CHANGE UP (ref 13–44)
MCHC RBC-ENTMCNC: 23.9 PG — LOW (ref 27–34)
MCHC RBC-ENTMCNC: 30.1 G/DL — LOW (ref 32–36)
MCV RBC AUTO: 79.2 FL — LOW (ref 80–100)
MONOCYTES # BLD AUTO: 0.35 K/UL — SIGNIFICANT CHANGE UP (ref 0–0.9)
MONOCYTES NFR BLD AUTO: 4.1 % — SIGNIFICANT CHANGE UP (ref 2–14)
NEUTROPHILS # BLD AUTO: 6.06 K/UL — SIGNIFICANT CHANGE UP (ref 1.8–7.4)
NEUTROPHILS NFR BLD AUTO: 71.5 % — SIGNIFICANT CHANGE UP (ref 43–77)
NITRITE UR-MCNC: NEGATIVE — SIGNIFICANT CHANGE UP
NRBC # BLD AUTO: 0 K/UL — SIGNIFICANT CHANGE UP (ref 0–0)
NRBC # FLD: 0 K/UL — SIGNIFICANT CHANGE UP (ref 0–0)
NRBC BLD AUTO-RTO: 0 /100 WBCS — SIGNIFICANT CHANGE UP (ref 0–0)
PH UR: 8 — SIGNIFICANT CHANGE UP (ref 5–8)
PLATELET # BLD AUTO: 242 K/UL — SIGNIFICANT CHANGE UP (ref 150–400)
POTASSIUM SERPL-MCNC: 4.6 MMOL/L — SIGNIFICANT CHANGE UP (ref 3.5–5.3)
POTASSIUM SERPL-MCNC: SIGNIFICANT CHANGE UP MMOL/L (ref 3.5–5.3)
POTASSIUM SERPL-SCNC: 4.6 MMOL/L — SIGNIFICANT CHANGE UP (ref 3.5–5.3)
POTASSIUM SERPL-SCNC: SIGNIFICANT CHANGE UP MMOL/L (ref 3.5–5.3)
PROT SERPL-MCNC: 8.3 G/DL — SIGNIFICANT CHANGE UP (ref 6–8.3)
PROT UR-MCNC: NEGATIVE MG/DL — SIGNIFICANT CHANGE UP
PROTHROM AB SERPL-ACNC: 10.6 SEC — SIGNIFICANT CHANGE UP (ref 9.9–13.4)
RBC # BLD: 5.95 M/UL — HIGH (ref 3.8–5.2)
RBC # FLD: 15.3 % — HIGH (ref 10.3–14.5)
SODIUM SERPL-SCNC: 133 MMOL/L — LOW (ref 135–145)
SODIUM SERPL-SCNC: 136 MMOL/L — SIGNIFICANT CHANGE UP (ref 135–145)
SP GR SPEC: 1.02 — SIGNIFICANT CHANGE UP (ref 1–1.03)
TROPONIN T, HIGH SENSITIVITY RESULT: <6 NG/L — SIGNIFICANT CHANGE UP
UROBILINOGEN FLD QL: 0.2 MG/DL — SIGNIFICANT CHANGE UP (ref 0.2–1)
WBC # BLD: 8.47 K/UL — SIGNIFICANT CHANGE UP (ref 3.8–10.5)
WBC # FLD AUTO: 8.47 K/UL — SIGNIFICANT CHANGE UP (ref 3.8–10.5)

## 2025-06-12 PROCEDURE — 70450 CT HEAD/BRAIN W/O DYE: CPT | Mod: 26,XU

## 2025-06-12 PROCEDURE — 71046 X-RAY EXAM CHEST 2 VIEWS: CPT | Mod: 26

## 2025-06-12 PROCEDURE — 70496 CT ANGIOGRAPHY HEAD: CPT | Mod: 26

## 2025-06-12 PROCEDURE — 99223 1ST HOSP IP/OBS HIGH 75: CPT

## 2025-06-12 PROCEDURE — 99291 CRITICAL CARE FIRST HOUR: CPT

## 2025-06-12 PROCEDURE — 70498 CT ANGIOGRAPHY NECK: CPT | Mod: 26

## 2025-06-12 RX ORDER — ENOXAPARIN SODIUM 100 MG/ML
40 INJECTION SUBCUTANEOUS EVERY 24 HOURS
Refills: 0 | Status: DISCONTINUED | OUTPATIENT
Start: 2025-06-12 | End: 2025-06-18

## 2025-06-12 RX ORDER — ATORVASTATIN CALCIUM 80 MG/1
40 TABLET, FILM COATED ORAL AT BEDTIME
Refills: 0 | Status: DISCONTINUED | OUTPATIENT
Start: 2025-06-12 | End: 2025-06-18

## 2025-06-12 RX ORDER — PREDNISONE 20 MG/1
10 TABLET ORAL DAILY
Refills: 0 | Status: DISCONTINUED | OUTPATIENT
Start: 2025-06-12 | End: 2025-06-18

## 2025-06-12 RX ORDER — ASPIRIN 325 MG
81 TABLET ORAL DAILY
Refills: 0 | Status: DISCONTINUED | OUTPATIENT
Start: 2025-06-12 | End: 2025-06-18

## 2025-06-12 RX ORDER — PREDNISONE 20 MG/1
1 TABLET ORAL
Refills: 0 | DISCHARGE

## 2025-06-12 NOTE — CONSULT NOTE ADULT - SUBJECTIVE AND OBJECTIVE BOX
Neurology - Consult Note    -  Spectra: [phone] ([facility]), [phone] ([facility])  -    HPI: Patient NÉSTOR SIMPSON is a 70y (1955) woman with a PMHx significant for cryptogenic organizing pneumonia on steroids, JASEN, small chronic infarct in L cerebellum, dizziness, syncope who presented today for dizziness. Code stroke activated after patient found to be altered, unresponsive, difficult to arouse, and having trouble getting her words out. Patient had multiple episodes in her primary care physician's office before he recommended she present to ED. LKW 13:30. In the ED patient was noted to be interacting normally until on reassessment at 16:20 nursing staff noted patient was difficult to arouse, closing her eyes, not reliably following commands and not speaking much/responding. On my assessment, patient had episode lasting 30 seconds of unresponsiveness, mouth agape, eyes closed, breathing heavily, not responding or making meaningful verbal output, then suddenly opening her eyes, becoming alert and saying "I'm come back, I'm back now". Now AAOx3 and following commands. Then had additional episode of 15 seconds of unresponsiveness, with then subsequent lucidity. Patient now AAOx3, following commands, no weakness noted, no dysarthria, no difficulty with repeating or naming.     NIHSS 1  mRS 0  LKW 13:30    Not a TNK candidate given symptoms resolving and mild. Not a thrombectomy candidate as no LVO.      Review of Systems  NEUROLOGICAL: +As stated in HPI above  All other review of systems is negative unless indicated above.    Allergies:  No Known Allergies      PMHx/PSHx/Family Hx: As above, otherwise see below   Cryptogenic Organizing Pneumonia    Iron Deficiency Anemia    PNA (pneumonia)        Social Hx:  No current use of tobacco, alcohol, or illicit drugs  Lives with ***    Medications:  MEDICATIONS  (STANDING):    MEDICATIONS  (PRN):      Vitals:  T(C): 36.8 (06-12-25 @ 15:29), Max: 36.8 (06-12-25 @ 15:29)  HR: 80 (06-12-25 @ 16:30) (73 - 80)  BP: 186/83 (06-12-25 @ 16:30) (146/88 - 186/83)  RR: 14 (06-12-25 @ 16:30) (14 - 17)  SpO2: 99% (06-12-25 @ 16:30) (99% - 99%)    Physical Examination:     Neurologic Exam:  Mental status - Initially somnolent and not responding, now Awake, Alert, Oriented to person, place, and time. Speech fluent, repetition and naming intact. Follows simple and complex commands.   Cranial nerves - PERRLA, VFF, EOMI, face sensation (V1-V3) intact b/l, facial strength intact without asymmetry b/l, hearing intact b/l, palate with symmetric elevation, trapezius 5/5 strength b/l, tongue midline on protrusion with full lateral movement    Motor - Normal bulk and tone throughout. No pronator drift.  Strength testing            Deltoid      Biceps      Triceps     Wrist Extension    Wrist Flexion     Interossei         R            5                 5               5                     5                              5                        5                 5  L             5                 5               5                     5                              5                        5                 5              Hip Flexion    Hip Extension    Knee Flexion    Knee Extension    Dorsiflexion    Plantar Flexion  R              5                           5                       5                           5                            5                          5  L              5                           5                        5                           5                            5                          5    Sensation - Light touch / pain/ intact throughout    DTR's -             Biceps      Triceps     Brachioradialis      Patellar    Ankle    Toes/plantar response  R             2+             2+                  2+                       2+            2+                 Down  L              2+             2+                 2+                        2+           2+                 Down    Coordination - Finger to Nose intact b/l. No tremors appreciated  Labs:          CAPILLARY BLOOD GLUCOSE  117 (12 Jun 2025 16:39)      POCT Blood Glucose.: 117 mg/dL (12 Jun 2025 15:33)          CSF:                  Radiology:     Neurology - Consult Note    -  Spectra: [phone] ([facility]), [phone] ([facility])  -    HPI: Patient NÉSTOR SIMPSON is a 70y (1955) woman with a PMHx significant for cryptogenic organizing pneumonia on steroids, JASEN, small chronic infarct in L cerebellum, dizziness, syncope who presented today for dizziness. Code stroke activated after patient found to be altered, unresponsive, difficult to arouse, and having trouble getting her words out. Patient had multiple episodes in her primary care physician's office before he recommended she present to ED. LKW 13:30. In the ED patient was noted to be interacting normally until on reassessment at 16:20 nursing staff noted patient was difficult to arouse, closing her eyes, not reliably following commands and not speaking much/responding. On my assessment, patient had episode lasting 30 seconds of unresponsiveness, mouth agape, eyes closed, breathing heavily, not responding or making meaningful verbal output, then suddenly opening her eyes, becoming alert and saying "I'm come back, I'm back now". Now AAOx3 and following commands. Then had additional episode of 15 seconds of unresponsiveness, with then subsequent lucidity. Patient now AAOx3, following commands, no weakness noted, no dysarthria, no difficulty with repeating or naming. Of note, patient had same episodes in April 2025 with EEG and MRI done, EEG was negative, MRI showed chronic left cerebellar infarct. Patient was started ASA 81 mg daily and dced home.     NIHSS 1  mRS 0  LKW 13:30    Not a TNK candidate given symptoms resolving and mild. Not a thrombectomy candidate as no LVO.      Review of Systems  NEUROLOGICAL: +As stated in HPI above  All other review of systems is negative unless indicated above.    Allergies:  No Known Allergies      PMHx/PSHx/Family Hx: As above, otherwise see below   Cryptogenic Organizing Pneumonia    Iron Deficiency Anemia    PNA (pneumonia)        Social Hx:  No current use of tobacco, alcohol, or illicit drugs  Lives with ***    Medications:  MEDICATIONS  (STANDING):    MEDICATIONS  (PRN):      Vitals:  T(C): 36.8 (06-12-25 @ 15:29), Max: 36.8 (06-12-25 @ 15:29)  HR: 80 (06-12-25 @ 16:30) (73 - 80)  BP: 186/83 (06-12-25 @ 16:30) (146/88 - 186/83)  RR: 14 (06-12-25 @ 16:30) (14 - 17)  SpO2: 99% (06-12-25 @ 16:30) (99% - 99%)    Physical Examination:     Neurologic Exam:  Mental status - Initially somnolent and not responding, now Awake, Alert, Oriented to person, place, and time. Speech fluent, repetition and naming intact. Follows simple and complex commands.   Cranial nerves - PERRLA, VFF, EOMI, face sensation (V1-V3) intact b/l, facial strength intact without asymmetry b/l, hearing intact b/l, palate with symmetric elevation, trapezius 5/5 strength b/l, tongue midline on protrusion with full lateral movement    Motor - Normal bulk and tone throughout. No pronator drift.  Strength testing            Deltoid      Biceps      Triceps     Wrist Extension    Wrist Flexion     Interossei         R            5                 5               5                     5                              5                        5                 5  L             5                 5               5                     5                              5                        5                 5              Hip Flexion    Hip Extension    Knee Flexion    Knee Extension    Dorsiflexion    Plantar Flexion  R              5                           5                       5                           5                            5                          5  L              5                           5                        5                           5                            5                          5    Sensation - Light touch / pain/ intact throughout    DTR's -             Biceps      Triceps     Brachioradialis      Patellar    Ankle    Toes/plantar response  R             2+             2+                  2+                       2+            2+                 Down  L              2+             2+                 2+                        2+           2+                 Down    Coordination - Finger to Nose intact b/l. No tremors appreciated  Labs:          CAPILLARY BLOOD GLUCOSE  117 (12 Jun 2025 16:39)      POCT Blood Glucose.: 117 mg/dL (12 Jun 2025 15:33)          CSF:                  Radiology:

## 2025-06-12 NOTE — H&P ADULT - PROBLEM SELECTOR PLAN 1
::Brief periods of altered mentation lasting seconds. DDx include psychiatric etiology vs metabolic or infectious etiology. Other differentials include TIA/CVA/Seizure although less likely given prior workup.   -NIHSS: 1; LKW: 13:30  -CTH: No acute intracranial hemorrhage, mass effect, or midline shift.  -CTA Head & Neck: No evidence of significant stenosis or occlusion of the bilateral common and internal carotid arteries. Short segment severe stenosis of the left vertebral artery V1 segment. Patent intracranial circulation without flow limiting stenosis. No evidence of aneurysm.  -ECG: No acute ischemic changes. Trop <6  -Continue with Aspirin 81 mg QD  -Atorvastatin 40 mg QHS, titrate to LDL <70  -Telemonitoring; Neurochecks and Vital signs Q4H  -Permissive HTN up to 220/120 mmHg for first 24 hours after the symptom onset followed by gradual normotension <130/80  -Orthostatic vitals  -Passed dysphagia screen  -Aspiration, fall precautions  -Stroke education and counseling  -Neurology consulted. Appreciate Recs  [ ] PT/OT/Speech  [ ] Lipid panel, A1c, TSH in AM  [ ] TTE w/ bubble study to look for a cardiac source of embolism  [ ] MRI brain w/o contrast to look at the extent and distribution of the stroke   [ ] vEEG  [ ] f/u NH3, TSH, Folate, B12, B1, Vit D 25,OH, CK, UDS, Acute toxicology panel

## 2025-06-12 NOTE — H&P ADULT - NSHPLABSRESULTS_GEN_ALL_CORE
14.2   8.47  )-----------( 242      ( 2025 17:02 )             47.1     136  |  98  |  13  ----------------------------<  102[H]       4.6   |  22  |  1.08    Ca    10.6[H]      2025 18:27    TPro  8.3  /  Alb  4.0  /  TBili  0.3  /  DBili  x   /  AST  54[H]  /  ALT  31  /  AlkPhos  76      PT/INR: 10.6/<0.90 (25 @ 18:27)  PTT: 38.1 (25 @ 18:27)              hs Troponin, T - <6 ng/L (25 @ 17:02)              Urinalysis Basic - ( 2025 18:14 )  Color: Yellow / Appearance: Clear / S.018 / pH: 8.0  Gluc: Negative mg/dL / Ketone: x  / Bili: Negative / Urobili: 0.2 mg/dL   Blood: Negative / Protein: Negative mg/dL / Nitrite: Negative   Leuk Esterase: Negative / RBC: x / WBC x   Sq Epi: x / Bacteria: x  Hyaline Casts: x/WBC Casts: x          Urinalysis with Rflx Culture (collected 2025 18:14)

## 2025-06-12 NOTE — ED PROVIDER NOTE - ATTENDING CONTRIBUTION TO CARE
cory: 70-year-old woman who comes into the emergency department after multiple episodes of aphasia.  She appears to have had this happen previously and had a workup that included video EEG that did not reveal seizure activity.  She had an MRI of the brain as well on 429 that demonstrated chronic infarct in the left cerebellum.  The description of today's episode is repeated episodes of aphasia.  I witnessed 1 in the emergency department where she had left facial droop as well as inability to express words.  She was able move all extremities there was no generalized tonic-clonic activity at the time.  She came around to herself shortly thereafter.      Stroke code was activated neurology was at the bedside.  Blood pressure 167/64 respiratory rate 18 heart rate 85 afebrile O2 sat is 100    Physical exam as above.    Patient called a stroke code the findings during my exam appear to be neurologic in nature although it sounds like last time she was admitted for syncope workup that also included neurology be involved as well.  She additionally had other episodes similar to this during the time in the emergency department at least 3 of this writing.  She similarly had an episode in her PMDs office patient had a dry skin done already dry skin does not show any mass bleed or shift.  We await the angios patient will clearly require admission    Upon my evaluation, this patient had a high probability of imminent or life-threatening deterioration due to stroke code/ altered ms, which required my direct attention, intervention, and personal management.  The patient has a  medical condition that impairs one or more vital organ systems.  Frequent personal assessment and adjustment of medical interventions was performed.      I have personally provided 34_ minutes of critical care time exclusive of time spent on separately billable procedures. Time includes review of laboratory data, radiology results, discussion with consultants, patient and family; monitoring for potential decompensation, as well as time spent retrieving data and reviewing the chart and documenting the visit. Interventions were performed as documented above.

## 2025-06-12 NOTE — CONSULT NOTE ADULT - ASSESSMENT
IMPRESSION: Episode of altered mentation, decreased responsiveness lasting seconds with then sudden return to baseline/alertness. Less likely seizure, previous workup of similar episodes negative on EEG. Less likely TIA. possibly psychogenic events.    RECOMMENDATIONS:  [] MRI Brain without contrast  [] Continue ASA 81 mg daily and Lipitor 40 mg qhs  [] TTE  [] Orthostatics  [] HbA1C and Lipid Panel.  [] DVT prophylaxis - Lovenox 40MG SC daily  [] Telemonitoring; Neurochecks and vital signs per unit protocol, Q4H  [] Permissive HTN up to 220/120 mmHg for first 24 hours after symptom onset followed by gradual normotension.   [] BG goal <180, avoid hypoglycemia  [] NPO until clears dysphagia screen, otherwise swallow evaluation  [] Fall, aspiration precautions  [] PT/OT eval  [] Stroke education provided    Patient discussed with stroke fellow Dr. Lopez.  Final recommendations regarding management to be confirmed upon review by stroke attending Dr. Flores.   IMPRESSION: Episode of altered mentation, decreased responsiveness lasting seconds with then sudden return to baseline/alertness. Less likely seizure, previous workup of similar episodes negative on EEG. Less likely TIA. possibly psychogenic events vs toxic/metabolic/infectious encephalopathy.    RECOMMENDATIONS:  [] MRI Brain without contrast  [] vEEG  [] Continue ASA 81 mg daily and Lipitor 40 mg qhs  [] TTE  [] Check Orthostatics  [] HbA1C and Lipid Panel.  [] Toxic/metabolic/infectious workup per primary team  [] DVT prophylaxis - Lovenox 40MG SC daily  [] Telemonitoring; Neurochecks and vital signs per unit protocol, Q4H  [] Permissive HTN up to 220/120 mmHg for first 24 hours after symptom onset followed by gradual normotension.   [] BG goal <180, avoid hypoglycemia  [] NPO until clears dysphagia screen, otherwise swallow evaluation  [] Fall, aspiration precautions  [] PT/OT eval  [] Stroke education provided    Patient discussed with stroke attending Dr. Flores.   IMPRESSION: Episode of altered mentation, decreased responsiveness lasting seconds with then sudden return to baseline/alertness. Less likely seizure, previous workup of similar episodes negative on EEG. Less likely TIA. possibly psychogenic events vs toxic/metabolic/infectious encephalopathy.    RECOMMENDATIONS:  [] MRI Brain without contrast  [] vEEG  [] Continue ASA 81 mg daily and Lipitor 40 mg qhs  [] TTE  [] Check Orthostatics  [] HbA1C and Lipid Panel.  [] Toxic/metabolic/infectious workup per primary team  [] DVT prophylaxis - Lovenox 40MG SC daily  [] Telemonitoring; Neurochecks and vital signs per unit protocol, Q4H  [] Permissive HTN up to 220/120 mmHg for first 24 hours after symptom onset followed by gradual normotension.   [] BG goal <180, avoid hypoglycemia  [] NPO until clears dysphagia screen, otherwise swallow evaluation  [] Fall, aspiration precautions  [] PT/OT eval  [] Stroke education provided    Patient to be seen by neurology attending Dr. Carr.

## 2025-06-12 NOTE — ED PROVIDER NOTE - CLINICAL SUMMARY MEDICAL DECISION MAKING FREE TEXT BOX
70 female past medical history of cryptogenic organizing pneumonia on steroids, JASEN, syncope sent in by her PMD for intermittent aphasia and decreased level of consciousness.  Called to bedside by RN because patient had decreased responsiveness and was aphasic.  Code stroke called.  According to triage no last known normal with PMD was at 1:30 PM.  Patient had similar episodes of intermittent aphasia and decreased level of consciousness when she was admitted end of April for syncope.  Had brain imaging which showed prior stroke and video EEG was negative.  No fever, recent illness, chest pain, difficulty breathing. While examining the patient during this episode she is able to speak but is slow to find words, slight left facial droop, moving all extremities.  Heart regular rate.  Lungs with symmetric chest expansion bilaterally.  Abdomen nondistended.  Vitals nonactionable.  Differential diagnosis includes but not limited to: CVA, TIA, seizure.  Plan: Follow-up blood work, brain imaging, and neurology recs.  Will reassess.  To be admitted.

## 2025-06-12 NOTE — ED ADULT NURSE REASSESSMENT NOTE - NS ED NURSE REASSESS COMMENT FT1
Pt received from dayshift RN; awake and alert, A&OX4, respirations even and unlabored. Denies CP, SOB, HA, dizziness, palpitations, blurry vision. Resting comfortably. Vitals as documented, no acute distress noted. Pt noted to have no neurological deficits at this time, NIH 0.

## 2025-06-12 NOTE — ED ADULT NURSE REASSESSMENT NOTE - NS ED NURSE REASSESS COMMENT FT1
ED focused US guided PIV placement by Registered Nurse. (supervised by MINERVA Julio)     Indication - poor access.      Findings: compressible right AC vein.  Using direct US guidance, under clean conditions, a long 20 Ga peripheral catheter introduced into vessel. US performed after procedure, functional catheter in vein, no complications.     Impression:  successful US guided right AC PIV placement.

## 2025-06-12 NOTE — ED ADULT TRIAGE NOTE - CHIEF COMPLAINT QUOTE
pt arrives from PCP office with LKW 130pm. per ems while patient was at the office, pt began "going in and out of consciousness for a few seconds" at this time pt A&Ox4, noted to have intermittent periods of aphasia, now resolved. denies chest pain, SOB ,headache, dizziness, nausea, vomiting. A&Ox4 at this time.  @1538 MD Simon called for stroke eval, no stroke activated   history of COPD

## 2025-06-12 NOTE — H&P ADULT - NSICDXPASTMEDICALHX_GEN_ALL_CORE_FT
PAST MEDICAL HISTORY:  Cryptogenic Organizing Pneumonia     GERD (gastroesophageal reflux disease)     Iron Deficiency Anemia

## 2025-06-12 NOTE — CONSULT NOTE ADULT - ATTENDING COMMENTS
Mr. 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 Neurology consulted for these paroxysmal episodes of decreased responsiveness lasting few seconds at a time. I witnessed two consecutive events on our rounds today. Patient suddenly partly closes eyes, but still able to nod or shake head to questions, able to follow commands like show thumbs up, and reacts to noxious during this event. She even slowly elevated each arm to command (give me high five). After approximately ten seconds, patient suddenly opens eyes and says "I am back", seemingly back at baseline without any post-ictal features. This semiology does not resemble seizures or really any neurologic entIty. Seems like patient had ambulatory EEG which captured event without correlate, however, results not vissible to us. Would attempt to capture these events in our EEG records and definitely diagnose as non-epileptiform seizures.       MRI not needed  VEEG to capture and characterize said event.  no seizure medications  please press event button once EEG connected and if any such event is witnessed

## 2025-06-12 NOTE — H&P ADULT - PROBLEM SELECTOR PROBLEM 4
· Recent TSH checked, reviewed from "care everywhere" (2/24) in euthyroid state  · Continue methimazole 2 5 mg every other day Need for prophylactic measure

## 2025-06-12 NOTE — H&P ADULT - PROBLEM SELECTOR PLAN 4
VTE PPx: Lovenox  GI PPx: Pantoprazole  Nutrition: Regular Diet  Fluids: PRN  Electrolytes: Maintain K>4, Mag >2, Phos > 3  Access: PIV  Code Status: FULL  Dispo: pending clinical improvement

## 2025-06-12 NOTE — H&P ADULT - NSHPREVIEWOFSYSTEMS_GEN_ALL_CORE
- CONSTITUTIONAL: Denies weight loss, fever and chills.  - HEENT: Denies changes in vision and hearing.  - RESPIRATORY: Denies SOB and cough.  - CV: Denies palpitations and CP.  - GI: Denies abdominal pain, nausea, vomiting and diarrhea.  - : Denies dysuria and urinary frequency.  - MSK: Denies myalgia and joint pain.  - SKIN: Denies rash and pruritus.  - NEUROLOGICAL: As per HPI  - PSYCHIATRIC: Denies recent changes in mood. Denies anxiety and depression.    A 12-point review of systems was completed and is otherwise negative except as noted in the HPI.

## 2025-06-12 NOTE — H&P ADULT - NSHPPHYSICALEXAM_GEN_ALL_CORE
- GENERAL: Alert and oriented x 3. No acute distress  - EYES: EOMI. Anicteric.  - HENT: No scleral icterus. No cervical lymphadenopathy.  - LUNGS: Clear to auscultation bilaterally. No accessory muscle use.  - CARDIOVASCULAR: Regular rate and rhythm. No murmur. No JVD.  - ABDOMEN: Soft, non-tender and non-distended. No palpable masses.  - EXTREMITIES: No edema. Non-tender.  - SKIN: No rashes or lesions. Warm.  - NEUROLOGIC: Orientated to self, date and place.  Attention intact.  No dysarthria. Speech fluent. Cranial Nerves: CN II - XII grossly intact. Normal tone. Motor strength 5/5 throughout. No ataxia. No resting, postural or action tremor.  No myoclonus. No nystagmus. Sensation intact to light touch. DTR 2+ throughout.  - PSYCHIATRIC: Cooperative. Appropriate mood and affect.

## 2025-06-12 NOTE — H&P ADULT - TIME BILLING
I have spent 83 minutes of time on the encounter which excludes teaching and separately reported services.    Preparing to see the patient including review of tests and other providers' notes, confirming history with patient/family member, performing medical examination and evaluation, counseling and educating the patient/family/caregiver, ordering medications, tests and procedures, communicating with other health care professionals, documenting clinical information in the EMR, independently interpreting results and communicating results to the patient/family/caregiver, care coordination

## 2025-06-12 NOTE — PATIENT PROFILE ADULT - FALL HARM RISK - HARM RISK INTERVENTIONS
Assistance with ambulation/Communicate Risk of Fall with Harm to all staff/Monitor for mental status changes/Monitor gait and stability/Tailored Fall Risk Interventions/Use of alarms - bed, chair and/or voice tab/Bed in lowest position, wheels locked, appropriate side rails in place/Call bell, personal items and telephone in reach/Instruct patient to call for assistance before getting out of bed or chair/Non-slip footwear when patient is out of bed/[place] to call system/Physically safe environment - no spills, clutter or unnecessary equipment/Purposeful Proactive Rounding/Room/bathroom lighting operational, light cord in reach

## 2025-06-12 NOTE — ED ADULT NURSE NOTE - OBJECTIVE STATEMENT
received patient in #26A on a stretcher , patient appears lethargic, not answering questions , responding to verbal stimuli with slurred speech , provider called to bed side, code stroke called. patient taken to CT for non contrast CT. provider with the patient assessing the patient.. respirations even and unlabored.  will cont to monitor.

## 2025-06-12 NOTE — H&P ADULT - PROBLEM SELECTOR PLAN 2
-Follows with pulmonology outpatient   -Intermittently uses O2 NC at home   -c/w prednisone 10 mg QD

## 2025-06-12 NOTE — H&P ADULT - HISTORY OF PRESENT ILLNESS
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 In the ED code stroke was activated, as patient was difficult to arouse, and having trouble getting her words out. During the neurology team’s assessment, patient had another similar episode of unresponsiveness for 30 seconds with eyes closed, breathing heavily, and no verbal output. After this brief period, patient suddenly opened her eyes becoming alert and saying, "I'm come back, I'm back now." Of note, patient had similar episodes in April 2025 with EEG and MRI done, EEG was negative, and MRI showing chronic left cerebellar infarct. During my evaluation, patient states that she is back at her baseline now and endorses no dysarthria, weakness, and if following commands. Patient reports being aware of the situation during these episodes and just cannot communicate back for a brief period of time. Denies headache, chest pain, palpitations, SOB, abdominal pain, joint pain, diarrhea/constipation, or urinary symptoms.

## 2025-06-12 NOTE — ED ADULT NURSE NOTE - CHIEF COMPLAINT QUOTE
pt arrives from PCP office with LKW 130pm. per ems while patient was at the office, pt began "going in and out of consciousness for a few seconds" at this time pt A&Ox4, noted to have intermittent periods of aphasia, now resolved. denies chest pain, SOB ,headache, dizziness, nausea, vomiting. A&Ox4 at this time.  @1534 MD Simon called for stroke eval, no stroke activated   history of COPD

## 2025-06-12 NOTE — H&P ADULT - ASSESSMENT
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 Detailed plan as below:

## 2025-06-13 ENCOUNTER — RESULT REVIEW (OUTPATIENT)
Age: 70
End: 2025-06-13

## 2025-06-13 LAB
24R-OH-CALCIDIOL SERPL-MCNC: 26 NG/ML — SIGNIFICANT CHANGE UP
A1C WITH ESTIMATED AVERAGE GLUCOSE RESULT: 5.7 % — HIGH (ref 4–5.6)
ANION GAP SERPL CALC-SCNC: 17 MMOL/L — HIGH (ref 7–14)
BUN SERPL-MCNC: 13 MG/DL — SIGNIFICANT CHANGE UP (ref 7–23)
CALCIUM SERPL-MCNC: 10 MG/DL — SIGNIFICANT CHANGE UP (ref 8.4–10.5)
CHLORIDE SERPL-SCNC: 100 MMOL/L — SIGNIFICANT CHANGE UP (ref 98–107)
CHOLEST SERPL-MCNC: 219 MG/DL — HIGH
CK SERPL-CCNC: 98 U/L — SIGNIFICANT CHANGE UP (ref 25–170)
CO2 SERPL-SCNC: 23 MMOL/L — SIGNIFICANT CHANGE UP (ref 22–31)
CREAT SERPL-MCNC: 0.91 MG/DL — SIGNIFICANT CHANGE UP (ref 0.5–1.3)
EGFR: 69 ML/MIN/1.73M2 — SIGNIFICANT CHANGE UP
EGFR: 69 ML/MIN/1.73M2 — SIGNIFICANT CHANGE UP
ESTIMATED AVERAGE GLUCOSE: 117 — SIGNIFICANT CHANGE UP
FOLATE SERPL-MCNC: 19.7 NG/ML — HIGH (ref 3.1–17.5)
GLUCOSE SERPL-MCNC: 76 MG/DL — SIGNIFICANT CHANGE UP (ref 70–99)
HCT VFR BLD CALC: 47.2 % — HIGH (ref 34.5–45)
HDLC SERPL-MCNC: 70 MG/DL — SIGNIFICANT CHANGE UP
HGB BLD-MCNC: 14.6 G/DL — SIGNIFICANT CHANGE UP (ref 11.5–15.5)
LDLC SERPL-MCNC: 134 MG/DL — HIGH
LIPID PNL WITH DIRECT LDL SERPL: 134 MG/DL — HIGH
MAGNESIUM SERPL-MCNC: 2.2 MG/DL — SIGNIFICANT CHANGE UP (ref 1.6–2.6)
MCHC RBC-ENTMCNC: 24.5 PG — LOW (ref 27–34)
MCHC RBC-ENTMCNC: 30.9 G/DL — LOW (ref 32–36)
MCV RBC AUTO: 79.1 FL — LOW (ref 80–100)
MRSA PCR RESULT.: SIGNIFICANT CHANGE UP
NONHDLC SERPL-MCNC: 149 MG/DL — HIGH
NRBC # BLD AUTO: 0 K/UL — SIGNIFICANT CHANGE UP (ref 0–0)
NRBC # FLD: 0 K/UL — SIGNIFICANT CHANGE UP (ref 0–0)
NRBC BLD AUTO-RTO: 0 /100 WBCS — SIGNIFICANT CHANGE UP (ref 0–0)
PHOSPHATE SERPL-MCNC: 4 MG/DL — SIGNIFICANT CHANGE UP (ref 2.5–4.5)
PLATELET # BLD AUTO: 248 K/UL — SIGNIFICANT CHANGE UP (ref 150–400)
POTASSIUM SERPL-MCNC: 3.6 MMOL/L — SIGNIFICANT CHANGE UP (ref 3.5–5.3)
POTASSIUM SERPL-SCNC: 3.6 MMOL/L — SIGNIFICANT CHANGE UP (ref 3.5–5.3)
RBC # BLD: 5.97 M/UL — HIGH (ref 3.8–5.2)
RBC # FLD: 14.7 % — HIGH (ref 10.3–14.5)
S AUREUS DNA NOSE QL NAA+PROBE: SIGNIFICANT CHANGE UP
SODIUM SERPL-SCNC: 140 MMOL/L — SIGNIFICANT CHANGE UP (ref 135–145)
TRIGL SERPL-MCNC: 84 MG/DL — SIGNIFICANT CHANGE UP
TSH SERPL-MCNC: 3.12 UIU/ML — SIGNIFICANT CHANGE UP (ref 0.27–4.2)
VIT B12 SERPL-MCNC: 528 PG/ML — SIGNIFICANT CHANGE UP (ref 200–900)
WBC # BLD: 9.93 K/UL — SIGNIFICANT CHANGE UP (ref 3.8–10.5)
WBC # FLD AUTO: 9.93 K/UL — SIGNIFICANT CHANGE UP (ref 3.8–10.5)

## 2025-06-13 PROCEDURE — 99222 1ST HOSP IP/OBS MODERATE 55: CPT

## 2025-06-13 PROCEDURE — 99232 SBSQ HOSP IP/OBS MODERATE 35: CPT

## 2025-06-13 PROCEDURE — 76376 3D RENDER W/INTRP POSTPROCES: CPT | Mod: 26

## 2025-06-13 PROCEDURE — 93306 TTE W/DOPPLER COMPLETE: CPT | Mod: 26

## 2025-06-13 PROCEDURE — 70551 MRI BRAIN STEM W/O DYE: CPT | Mod: 26

## 2025-06-13 RX ADMIN — ATORVASTATIN CALCIUM 40 MILLIGRAM(S): 80 TABLET, FILM COATED ORAL at 21:23

## 2025-06-13 RX ADMIN — Medication 250 MILLILITER(S): at 06:51

## 2025-06-13 RX ADMIN — PREDNISONE 10 MILLIGRAM(S): 20 TABLET ORAL at 05:59

## 2025-06-13 RX ADMIN — Medication 50 MILLILITER(S): at 11:20

## 2025-06-13 RX ADMIN — Medication 50 MILLILITER(S): at 08:21

## 2025-06-13 RX ADMIN — Medication 81 MILLIGRAM(S): at 11:19

## 2025-06-13 RX ADMIN — Medication 40 MILLIGRAM(S): at 05:59

## 2025-06-13 RX ADMIN — ENOXAPARIN SODIUM 40 MILLIGRAM(S): 100 INJECTION SUBCUTANEOUS at 05:59

## 2025-06-13 NOTE — PHYSICAL THERAPY INITIAL EVALUATION ADULT - GAIT DISTANCE, PT EVAL
30 feet.  Pt feeling b/l Lower extremity weakness during ambulation.  Pt having to sit down midway during ambulation assessment.  vitals WNL.  Pt reporting she did not black out, however just feeling weakness/fatigue midway.  Will continue to monitor patients functional abilities.

## 2025-06-13 NOTE — PHYSICAL THERAPY INITIAL EVALUATION ADULT - PATIENT/FAMILY/SIGNIFICANT OTHER GOALS STATEMENT, PT EVAL
74 year old Jehovah witness, PMHx HTN, HLD, Type 2 DM, CAD, and recent dx of malignant neoplasm of left colon, s/p laparoscopic converted to open left hemicolectomy on 04/05, recovering appropriately on the floors.    PLAN:  - CLD  - cont to trend H/H  - Appreciate Heme/Onc recs: continue procrit, f/u AM abs, SQH  - Incentive spirometry  - Pain control: PO meds  - encourage ambulation, OOB to chair  - VTE prophylaxis with SQH    A Team Surgery   [id number]    74 year old Jehovah witness, PMHx HTN, HLD, Type 2 DM, CAD, and recent dx of malignant neoplasm of left colon, s/p laparoscopic converted to open left hemicolectomy on 04/05, recovering appropriately on the floors.    PLAN:  - CLD, advanced to Diabetic LRD  - cont to trend H/H  - Appreciate Heme/Onc recs: continue procrit, f/u AM abs, SQH  - Incentive spirometry  - Pain control: PO meds  - encourage ambulation, OOB to chair  - VTE prophylaxis with SQH    A Team Surgery   [id number]    Pt would like to improve functional mobility

## 2025-06-13 NOTE — SPEECH LANGUAGE PATHOLOGY EVALUATION - COMMENTS
6/12 Neurology Note: "IMPRESSION: Episode of altered mentation, decreased responsiveness lasting seconds with then sudden return to baseline/alertness. Less likely seizure, previous workup of similar episodes negative on EEG. Less likely TIA. possibly psychogenic events vs toxic/metabolic/infectious encephalopathy."    6/12 CT Head: "IMPRESSION: No acute intracranial hemorrhage, mass effect, or midline shift." 6/12 Neurology Note: "IMPRESSION: Episode of altered mentation, decreased responsiveness lasting seconds with then sudden return to baseline/alertness. Less likely seizure, previous workup of similar episodes negative on EEG. Less likely TIA. possibly psychogenic events vs toxic/metabolic/infectious encephalopathy."    6/12 CT Head: "IMPRESSION: No acute intracranial hemorrhage, mass effect, or midline shift."    Attempted to see patient for speech/language evaluation, however patient is off the unit for MRI. ACP notified, this service to follow up tomorrow.

## 2025-06-13 NOTE — PHYSICAL THERAPY INITIAL EVALUATION ADULT - ADDITIONAL COMMENTS
Pt lives in a home with family 5 steps to negotiate, no falls, fully independent, does not use DME.   Patients Current SpO2: 99%

## 2025-06-13 NOTE — OCCUPATIONAL THERAPY INITIAL EVALUATION ADULT - GENERAL OBSERVATIONS, REHAB EVAL
Patient received semisupine in NAD +pulse oximeter Patient received semisupine in NAD +pulse oximeter +nasal canula 2L +telemetry monitor. Despite reports of feeling weak, patient was agreeable to participate in occupational therapy evaluation.

## 2025-06-13 NOTE — OCCUPATIONAL THERAPY INITIAL EVALUATION ADULT - NSOTDISCHREC_GEN_A_CORE
Patient reporting daughter and spouse are available at home to help, assistance with ADLs if needed/No skilled OT needs

## 2025-06-13 NOTE — OCCUPATIONAL THERAPY INITIAL EVALUATION ADULT - PLANNED THERAPY INTERVENTIONS, OT EVAL
ADL retraining/IADL retraining/balance training/motor coordination training/ROM/strengthening ADL retraining/IADL retraining/balance training/bed mobility training/motor coordination training/ROM/strengthening/transfer training

## 2025-06-13 NOTE — PROGRESS NOTE ADULT - SUBJECTIVE AND OBJECTIVE BOX
[facility] Division of Hospital Medicine  Lesa Iqbal DO  Available via MS Teams    SUBJECTIVE / OVERNIGHT EVENTS:  Resting in chair  Daughter at bedside  States she had similar episode of "unresponsiveness", described as expressive aphasia, few months ago  Has been undergoing workup outpatient     ADDITIONAL REVIEW OF SYSTEMS:    MEDICATIONS  (STANDING):  aspirin enteric coated 81 milliGRAM(s) Oral daily  atorvastatin 40 milliGRAM(s) Oral at bedtime  enoxaparin Injectable 40 milliGRAM(s) SubCutaneous every 24 hours  pantoprazole    Tablet 40 milliGRAM(s) Oral before breakfast  predniSONE   Tablet 10 milliGRAM(s) Oral daily  sodium chloride 0.9%. 250 milliLiter(s) (250 mL/Hr) IV Continuous <Continuous>  sodium chloride 0.9%. 1000 milliLiter(s) (50 mL/Hr) IV Continuous <Continuous>    MEDICATIONS  (PRN):      I&O's Summary      PHYSICAL EXAM:  Vital Signs Last 24 Hrs  T(C): 36.7 (13 Jun 2025 11:50), Max: 36.9 (12 Jun 2025 20:57)  T(F): 98 (13 Jun 2025 11:50), Max: 98.4 (12 Jun 2025 20:57)  HR: 64 (13 Jun 2025 11:50) (64 - 90)  BP: 116/72 (13 Jun 2025 11:50) (106/78 - 186/83)  BP(mean): 163 (12 Jun 2025 16:30) (163 - 163)  RR: 18 (13 Jun 2025 11:50) (14 - 18)  SpO2: 96% (13 Jun 2025 11:50) (96% - 100%)    Parameters below as of 13 Jun 2025 11:50  Patient On (Oxygen Delivery Method): nasal cannula  O2 Flow (L/min): 2    CONSTITUTIONAL: NAD  ENMT: Oral mucosa with moist membranes  NECK: Supple  RESP: No respiratory distress, no use of accessory muscles  CV: RRR  GI: Soft, NT, ND  MSK: No gross deformity   NEURO: No focal deficits   PSYCH: Appropriate insight/judgment; A+O to person, place, date/time, president     LABS:                        14.6   9.93  )-----------( 248      ( 13 Jun 2025 06:47 )             47.2     06-13    140  |  100  |  13  ----------------------------<  76  3.6   |  23  |  0.91    Ca    10.0      13 Jun 2025 06:47  Phos  4.0     06-13  Mg     2.20     06-13    TPro  8.3  /  Alb  4.0  /  TBili  0.3  /  DBili  x   /  AST  54[H]  /  ALT  31  /  AlkPhos  76  06-12    PT/INR - ( 12 Jun 2025 18:27 )   PT: 10.6 sec;   INR: <0.90 ratio         PTT - ( 12 Jun 2025 18:27 )  PTT:38.1 sec      Urinalysis Basic - ( 13 Jun 2025 06:47 )    Color: x / Appearance: x / SG: x / pH: x  Gluc: 76 mg/dL / Ketone: x  / Bili: x / Urobili: x   Blood: x / Protein: x / Nitrite: x   Leuk Esterase: x / RBC: x / WBC x   Sq Epi: x / Non Sq Epi: x / Bacteria: x        Urinalysis with Rflx Culture (collected 12 Jun 2025 18:14)          RADIOLOGY & ADDITIONAL TESTS:  New Imaging Personally Reviewed Today: yes  New Electrocardiogram Personally Reviewed Today: yes  Other Results Reviewed Today: yes  Prior or Outpatient Records Reviewed Today with Summary: yes    COORDINATION OF CARE:  Consultant Communication and Details of Discussion (where applicable):

## 2025-06-13 NOTE — OCCUPATIONAL THERAPY INITIAL EVALUATION ADULT - PERTINENT HX OF CURRENT PROBLEM, REHAB EVAL
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 Code stroke activated in ED after displaying aphasic symptoms. Patient reports using oxygen at home, 4L, as needed. 70 year old female with history of cryptogenic organizing pneumonia on steroids, JASEN, GERD, small chronic infarct in left cerebellum, dizziness, syncope presented for dizziness. Code stroke activated in ED after displaying aphasic symptoms. CT brain negative for acute abnormality.

## 2025-06-13 NOTE — PROGRESS NOTE ADULT - ASSESSMENT
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

## 2025-06-14 LAB
ANION GAP SERPL CALC-SCNC: 14 MMOL/L — SIGNIFICANT CHANGE UP (ref 7–14)
BASOPHILS # BLD AUTO: 0.07 K/UL — SIGNIFICANT CHANGE UP (ref 0–0.2)
BASOPHILS NFR BLD AUTO: 0.8 % — SIGNIFICANT CHANGE UP (ref 0–2)
BUN SERPL-MCNC: 18 MG/DL — SIGNIFICANT CHANGE UP (ref 7–23)
CALCIUM SERPL-MCNC: 9.2 MG/DL — SIGNIFICANT CHANGE UP (ref 8.4–10.5)
CHLORIDE SERPL-SCNC: 103 MMOL/L — SIGNIFICANT CHANGE UP (ref 98–107)
CO2 SERPL-SCNC: 22 MMOL/L — SIGNIFICANT CHANGE UP (ref 22–31)
CREAT SERPL-MCNC: 0.82 MG/DL — SIGNIFICANT CHANGE UP (ref 0.5–1.3)
EGFR: 78 ML/MIN/1.73M2 — SIGNIFICANT CHANGE UP
EGFR: 78 ML/MIN/1.73M2 — SIGNIFICANT CHANGE UP
EOSINOPHIL # BLD AUTO: 0.4 K/UL — SIGNIFICANT CHANGE UP (ref 0–0.5)
EOSINOPHIL NFR BLD AUTO: 4.5 % — SIGNIFICANT CHANGE UP (ref 0–6)
GLUCOSE SERPL-MCNC: 82 MG/DL — SIGNIFICANT CHANGE UP (ref 70–99)
HCT VFR BLD CALC: 42.1 % — SIGNIFICANT CHANGE UP (ref 34.5–45)
HGB BLD-MCNC: 13.1 G/DL — SIGNIFICANT CHANGE UP (ref 11.5–15.5)
IANC: 3.81 K/UL — SIGNIFICANT CHANGE UP (ref 1.8–7.4)
IMM GRANULOCYTES NFR BLD AUTO: 0.3 % — SIGNIFICANT CHANGE UP (ref 0–0.9)
LYMPHOCYTES # BLD AUTO: 3.67 K/UL — HIGH (ref 1–3.3)
LYMPHOCYTES # BLD AUTO: 41.3 % — SIGNIFICANT CHANGE UP (ref 13–44)
MAGNESIUM SERPL-MCNC: 2.1 MG/DL — SIGNIFICANT CHANGE UP (ref 1.6–2.6)
MCHC RBC-ENTMCNC: 24 PG — LOW (ref 27–34)
MCHC RBC-ENTMCNC: 31.1 G/DL — LOW (ref 32–36)
MCV RBC AUTO: 77.2 FL — LOW (ref 80–100)
MONOCYTES # BLD AUTO: 0.91 K/UL — HIGH (ref 0–0.9)
MONOCYTES NFR BLD AUTO: 10.2 % — SIGNIFICANT CHANGE UP (ref 2–14)
NEUTROPHILS # BLD AUTO: 3.81 K/UL — SIGNIFICANT CHANGE UP (ref 1.8–7.4)
NEUTROPHILS NFR BLD AUTO: 42.9 % — LOW (ref 43–77)
NRBC # BLD AUTO: 0 K/UL — SIGNIFICANT CHANGE UP (ref 0–0)
NRBC # FLD: 0 K/UL — SIGNIFICANT CHANGE UP (ref 0–0)
NRBC BLD AUTO-RTO: 0 /100 WBCS — SIGNIFICANT CHANGE UP (ref 0–0)
PHOSPHATE SERPL-MCNC: 3.6 MG/DL — SIGNIFICANT CHANGE UP (ref 2.5–4.5)
PLATELET # BLD AUTO: 226 K/UL — SIGNIFICANT CHANGE UP (ref 150–400)
POTASSIUM SERPL-MCNC: 3.8 MMOL/L — SIGNIFICANT CHANGE UP (ref 3.5–5.3)
POTASSIUM SERPL-SCNC: 3.8 MMOL/L — SIGNIFICANT CHANGE UP (ref 3.5–5.3)
RBC # BLD: 5.45 M/UL — HIGH (ref 3.8–5.2)
RBC # FLD: 14.4 % — SIGNIFICANT CHANGE UP (ref 10.3–14.5)
SODIUM SERPL-SCNC: 139 MMOL/L — SIGNIFICANT CHANGE UP (ref 135–145)
WBC # BLD: 8.89 K/UL — SIGNIFICANT CHANGE UP (ref 3.8–10.5)
WBC # FLD AUTO: 8.89 K/UL — SIGNIFICANT CHANGE UP (ref 3.8–10.5)

## 2025-06-14 PROCEDURE — 99232 SBSQ HOSP IP/OBS MODERATE 35: CPT

## 2025-06-14 RX ADMIN — Medication 40 MILLIGRAM(S): at 05:37

## 2025-06-14 RX ADMIN — Medication 81 MILLIGRAM(S): at 12:45

## 2025-06-14 RX ADMIN — PREDNISONE 10 MILLIGRAM(S): 20 TABLET ORAL at 05:37

## 2025-06-14 RX ADMIN — ENOXAPARIN SODIUM 40 MILLIGRAM(S): 100 INJECTION SUBCUTANEOUS at 05:38

## 2025-06-14 RX ADMIN — ATORVASTATIN CALCIUM 40 MILLIGRAM(S): 80 TABLET, FILM COATED ORAL at 21:56

## 2025-06-14 NOTE — PROGRESS NOTE ADULT - SUBJECTIVE AND OBJECTIVE BOX
Dr. Diana Mendoza  Pager [phone]    PROGRESS NOTE:     Patient is a 70y old  Female who presents with a chief complaint of AMS (13 Jun 2025 16:00)      SUBJECTIVE / OVERNIGHT EVENTS: denies chest pain or sob   ADDITIONAL REVIEW OF SYSTEMS: afebrile , mental status improved, reports hx of cryptogenic organizing pneumonia f/up pulm Dr. Abel, on prednisone 10 mg, here with AMS/lethargy/aphasia    MEDICATIONS  (STANDING):  aspirin enteric coated 81 milliGRAM(s) Oral daily  atorvastatin 40 milliGRAM(s) Oral at bedtime  enoxaparin Injectable 40 milliGRAM(s) SubCutaneous every 24 hours  pantoprazole    Tablet 40 milliGRAM(s) Oral before breakfast  predniSONE   Tablet 10 milliGRAM(s) Oral daily  sodium chloride 0.9%. 1000 milliLiter(s) (50 mL/Hr) IV Continuous <Continuous>    MEDICATIONS  (PRN):      CAPILLARY BLOOD GLUCOSE        I&O's Summary      PHYSICAL EXAM:  Vital Signs Last 24 Hrs  T(C): 36.7 (14 Jun 2025 12:00), Max: 37 (14 Jun 2025 04:00)  T(F): 98 (14 Jun 2025 12:00), Max: 98.6 (14 Jun 2025 04:00)  HR: 88 (14 Jun 2025 12:00) (62 - 88)  BP: 135/70 (14 Jun 2025 12:00) (111/75 - 137/68)  BP(mean): --  RR: 17 (14 Jun 2025 12:00) (17 - 19)  SpO2: 100% (14 Jun 2025 12:00) (96% - 100%)    Parameters below as of 14 Jun 2025 12:00  Patient On (Oxygen Delivery Method): nasal cannula  O2 Flow (L/min): 2    CONSTITUTIONAL: nad  RESPIRATORY: clear, decreased air entry b/l  CARDIOVASCULAR: Regular rate and rhythm, normal S1 and S2, no murmur/rub/gallop; No lower extremity edema; Peripheral pulses are 2+ bilaterally  ABDOMEN: Nontender to palpation, normoactive bowel sounds, no rebound/guarding; No hepatosplenomegaly  MUSCULOSKELETAL: no clubbing or cyanosis of digits; no joint swelling or tenderness to palpation  PSYCH: A+O to person, place, and time; affect appropriate    LABS:                        13.1   8.89  )-----------( 226      ( 14 Jun 2025 04:21 )             42.1     06-14    139  |  103  |  18  ----------------------------<  82  3.8   |  22  |  0.82    Ca    9.2      14 Jun 2025 04:21  Phos  3.6     06-14  Mg     2.10     06-14    TPro  8.3  /  Alb  4.0  /  TBili  0.3  /  DBili  x   /  AST  54[H]  /  ALT  31  /  AlkPhos  76  06-12    PT/INR - ( 12 Jun 2025 18:27 )   PT: 10.6 sec;   INR: <0.90 ratio         PTT - ( 12 Jun 2025 18:27 )  PTT:38.1 sec      Urinalysis Basic - ( 14 Jun 2025 04:21 )    Color: x / Appearance: x / SG: x / pH: x  Gluc: 82 mg/dL / Ketone: x  / Bili: x / Urobili: x   Blood: x / Protein: x / Nitrite: x   Leuk Esterase: x / RBC: x / WBC x   Sq Epi: x / Non Sq Epi: x / Bacteria: x        Culture - Blood (collected 12 Jun 2025 20:48)  Source: Blood Blood-Peripheral  Preliminary Report (14 Jun 2025 11:01):    No growth at 24 hours    Culture - Blood (collected 12 Jun 2025 20:40)  Source: Blood Blood-Venous  Preliminary Report (14 Jun 2025 11:01):    No growth at 24 hours    Urinalysis with Rflx Culture (collected 12 Jun 2025 18:14)    RADIOLOGY & ADDITIONAL TESTS:  Results Reviewed:   Imaging Personally Reviewed:  < from: MR Head No Cont (06.13.25 @ 15:00) >    IMPRESSION: No acute intracranial hemorrhage or evidence of acute   ischemia.    Multiple small rounded nonspecific abnormal white matter foci of T2/FLAIR   prolongation statistically favoring microvascular type changes and small   chronic lacunar infarcts in the left cerebellar hemisphere.      < from: TTE W or WO Ultrasound Enhancing Agent (06.13.25 @ 09:34) >  CONCLUSIONS:      1. Left ventricular cavity is small. Left ventricular systolic function is normal with an ejection fraction of 61 % by 3D. There are no regional wall motion abnormalities seen.   2. There is normal LV mass and concentric remodeling.   3. There is mild (grade 1) left ventricular diastolic dysfunction,with normal left ventricular filling pressure.   4. Normal right ventricular cavity size, with normal wall thickness, and normal right ventricular systolic function.   5. Normal left and right atrial size.   6. No significant valvular disease.   7. The inferior vena cava is normal in size measuring 1.17 cm in diameter, (normal <2.1cm) with normal inspiratory collapse (normal >50%) consistent with normal right atrial pressure (~3, range 0-5mmHg).   8. No pericardial effusion seen.   9. Agitated saline injection reveals a large amount of bubbles in the left heart, consistent with an intracardiac shunt (such as a patent foramen ovale) with a right to left atrial level shunt.  10. Compared to the transthoracic echocardiogram performed on 4/29/2025, a bubble study was performed and a shunt was identified.      < from: Xray Chest 2 Views PA/Lat (06.12.25 @ 18:47) >  IMPRESSION:  Diffuse bilateral airspace opacities.        Electrocardiogram Personally Reviewed:    COORDINATION OF CARE:  Care Discussed with Consultants/Other Providers [Y/N]:  Prior or Outpatient Records Reviewed [Y/N]:

## 2025-06-14 NOTE — SPEECH LANGUAGE PATHOLOGY EVALUATION - COMMENTS
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 In the ED code stroke was activated, as patient was difficult to arouse, and having trouble getting her words out. During the neurology team’s assessment, patient had another similar episode of unresponsiveness for 30 seconds with eyes closed, breathing heavily, and no verbal output. After this brief period, patient suddenly opened her eyes becoming alert and saying, "I'm come back, I'm back now." Of note, patient had similar episodes in April 2025 with EEG and MRI done, EEG was negative, and MRI showing chronic left cerebellar infarct. During my evaluation, patient states that she is back at her baseline now and endorses no dysarthria, weakness, and if following commands. Patient reports being aware of the situation during these episodes and just cannot communicate back for a brief period of time. Denies headache, chest pain, palpitations, SOB, abdominal pain, joint pain, diarrhea/constipation, or urinary symptoms."     6/12 Neurology Note: "IMPRESSION: Episode of altered mentation, decreased responsiveness lasting seconds with then sudden return to baseline/alertness. Less likely seizure, previous workup of similar episodes negative on EEG. Less likely TIA. possibly psychogenic events vs toxic/metabolic/infectious encephalopathy."    6/13 MR Head No Cont: "IMPRESSION: No acute intracranial hemorrhage or evidence of acute ischemia. Multiple small rounded nonspecific abnormal white matter foci of T2/FLAIR prolongation statistically favoring microvascular type changes and small chronic lacunar infarcts in the left cerebellar hemisphere."     Patient received in bed awake and alert. Patient agreeable to speech and language evaluation. Medical team advised to reconsult this service if there is a change in status. This service to follow-up as scheduling permits. Discussed with patient ACP Pepe Mayer via TEAMS.

## 2025-06-14 NOTE — SPEECH LANGUAGE PATHOLOGY EVALUATION - SLP DIAGNOSIS
Patient's motor speech, expressive language, and receptive language are judged to be WFL at time of assessment.

## 2025-06-14 NOTE — SPEECH LANGUAGE PATHOLOGY EVALUATION - H & P REVIEW
yes
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 In the ED code stroke was activated, as patient was difficult to arouse, and having trouble getting her words out. During the neurology team’s assessment, patient had another similar episode of unresponsiveness for 30 seconds with eyes closed, breathing heavily, and no verbal output. After this brief period, patient suddenly opened her eyes becoming alert and saying, "I'm come back, I'm back now." Of note, patient had similar episodes in April 2025 with EEG and MRI done, EEG was negative, and MRI showing chronic left cerebellar infarct. During my evaluation, patient states that she is back at her baseline now and endorses no dysarthria, weakness, and if following commands. Patient reports being aware of the situation during these episodes and just cannot communicate back for a brief period of time. Denies headache, chest pain, palpitations, SOB, abdominal pain, joint pain, diarrhea/constipation, or urinary symptoms./yes

## 2025-06-15 LAB
ANION GAP SERPL CALC-SCNC: 12 MMOL/L — SIGNIFICANT CHANGE UP (ref 7–14)
ANISOCYTOSIS BLD QL: SLIGHT — SIGNIFICANT CHANGE UP
BASOPHILS # BLD AUTO: 0 K/UL — SIGNIFICANT CHANGE UP (ref 0–0.2)
BASOPHILS NFR BLD AUTO: 0 % — SIGNIFICANT CHANGE UP (ref 0–2)
BUN SERPL-MCNC: 11 MG/DL — SIGNIFICANT CHANGE UP (ref 7–23)
CALCIUM SERPL-MCNC: 8.4 MG/DL — SIGNIFICANT CHANGE UP (ref 8.4–10.5)
CHLORIDE SERPL-SCNC: 106 MMOL/L — SIGNIFICANT CHANGE UP (ref 98–107)
CO2 SERPL-SCNC: 25 MMOL/L — SIGNIFICANT CHANGE UP (ref 22–31)
CREAT SERPL-MCNC: 1.04 MG/DL — SIGNIFICANT CHANGE UP (ref 0.5–1.3)
DACRYOCYTES BLD QL SMEAR: SLIGHT — SIGNIFICANT CHANGE UP
EGFR: 59 ML/MIN/1.73M2 — LOW
EGFR: 59 ML/MIN/1.73M2 — LOW
EOSINOPHIL # BLD AUTO: 0.12 K/UL — SIGNIFICANT CHANGE UP (ref 0–0.5)
EOSINOPHIL NFR BLD AUTO: 0.9 % — SIGNIFICANT CHANGE UP (ref 0–6)
GLUCOSE SERPL-MCNC: 74 MG/DL — SIGNIFICANT CHANGE UP (ref 70–99)
HCT VFR BLD CALC: 36.3 % — SIGNIFICANT CHANGE UP (ref 34.5–45)
HGB BLD-MCNC: 10.9 G/DL — LOW (ref 11.5–15.5)
IANC: 9.04 K/UL — HIGH (ref 1.8–7.4)
LYMPHOCYTES # BLD AUTO: 0.59 K/UL — LOW (ref 1–3.3)
LYMPHOCYTES # BLD AUTO: 4.6 % — LOW (ref 13–44)
MACROCYTES BLD QL: SLIGHT — SIGNIFICANT CHANGE UP
MAGNESIUM SERPL-MCNC: 1.5 MG/DL — LOW (ref 1.6–2.6)
MANUAL SMEAR VERIFICATION: SIGNIFICANT CHANGE UP
MCHC RBC-ENTMCNC: 26.9 PG — LOW (ref 27–34)
MCHC RBC-ENTMCNC: 30 G/DL — LOW (ref 32–36)
MCV RBC AUTO: 89.6 FL — SIGNIFICANT CHANGE UP (ref 80–100)
MONOCYTES # BLD AUTO: 1.29 K/UL — HIGH (ref 0–0.9)
MONOCYTES NFR BLD AUTO: 10 % — SIGNIFICANT CHANGE UP (ref 2–14)
MYELOCYTES NFR BLD: 0.9 % — HIGH (ref 0–0)
NEUTROPHILS # BLD AUTO: 10.4 K/UL — HIGH (ref 1.8–7.4)
NEUTROPHILS NFR BLD AUTO: 80.9 % — HIGH (ref 43–77)
OVALOCYTES BLD QL SMEAR: SLIGHT — SIGNIFICANT CHANGE UP
PHOSPHATE SERPL-MCNC: 2.4 MG/DL — LOW (ref 2.5–4.5)
PLAT MORPH BLD: NORMAL — SIGNIFICANT CHANGE UP
PLATELET # BLD AUTO: 168 K/UL — SIGNIFICANT CHANGE UP (ref 150–400)
PLATELET COUNT - ESTIMATE: NORMAL — SIGNIFICANT CHANGE UP
POIKILOCYTOSIS BLD QL AUTO: SLIGHT — SIGNIFICANT CHANGE UP
POLYCHROMASIA BLD QL SMEAR: SLIGHT — SIGNIFICANT CHANGE UP
POTASSIUM SERPL-MCNC: 4 MMOL/L — SIGNIFICANT CHANGE UP (ref 3.5–5.3)
POTASSIUM SERPL-SCNC: 4 MMOL/L — SIGNIFICANT CHANGE UP (ref 3.5–5.3)
RBC # BLD: 4.05 M/UL — SIGNIFICANT CHANGE UP (ref 3.8–5.2)
RBC # FLD: 18.3 % — HIGH (ref 10.3–14.5)
RBC BLD AUTO: ABNORMAL
SMUDGE CELLS # BLD: PRESENT — SIGNIFICANT CHANGE UP
SODIUM SERPL-SCNC: 143 MMOL/L — SIGNIFICANT CHANGE UP (ref 135–145)
VARIANT LYMPHS # BLD: 2.7 % — SIGNIFICANT CHANGE UP (ref 0–6)
VARIANT LYMPHS NFR BLD MANUAL: 2.7 % — SIGNIFICANT CHANGE UP (ref 0–6)
WBC # BLD: 12.86 K/UL — HIGH (ref 3.8–10.5)
WBC # FLD AUTO: 12.86 K/UL — HIGH (ref 3.8–10.5)

## 2025-06-15 PROCEDURE — 99232 SBSQ HOSP IP/OBS MODERATE 35: CPT

## 2025-06-15 RX ORDER — MAGNESIUM SULFATE 500 MG/ML
1 SYRINGE (ML) INJECTION ONCE
Refills: 0 | Status: DISCONTINUED | OUTPATIENT
Start: 2025-06-15 | End: 2025-06-15

## 2025-06-15 RX ORDER — MAGNESIUM OXIDE 400 MG
800 TABLET ORAL ONCE
Refills: 0 | Status: COMPLETED | OUTPATIENT
Start: 2025-06-15 | End: 2025-06-15

## 2025-06-15 RX ORDER — MAGNESIUM OXIDE 400 MG
400 TABLET ORAL EVERY 6 HOURS
Refills: 0 | Status: COMPLETED | OUTPATIENT
Start: 2025-06-15 | End: 2025-06-15

## 2025-06-15 RX ORDER — SOD PHOS DI, MONO/K PHOS MONO 250 MG
2 TABLET ORAL ONCE
Refills: 0 | Status: COMPLETED | OUTPATIENT
Start: 2025-06-15 | End: 2025-06-15

## 2025-06-15 RX ADMIN — Medication 1 APPLICATION(S): at 12:21

## 2025-06-15 RX ADMIN — PREDNISONE 10 MILLIGRAM(S): 20 TABLET ORAL at 05:37

## 2025-06-15 RX ADMIN — ENOXAPARIN SODIUM 40 MILLIGRAM(S): 100 INJECTION SUBCUTANEOUS at 05:37

## 2025-06-15 RX ADMIN — ATORVASTATIN CALCIUM 40 MILLIGRAM(S): 80 TABLET, FILM COATED ORAL at 21:38

## 2025-06-15 RX ADMIN — Medication 800 MILLIGRAM(S): at 14:35

## 2025-06-15 RX ADMIN — Medication 40 MILLIGRAM(S): at 05:37

## 2025-06-15 RX ADMIN — Medication 400 MILLIGRAM(S): at 12:21

## 2025-06-15 RX ADMIN — Medication 400 MILLIGRAM(S): at 23:32

## 2025-06-15 RX ADMIN — Medication 400 MILLIGRAM(S): at 18:25

## 2025-06-15 RX ADMIN — Medication 2 TABLET(S): at 12:21

## 2025-06-15 RX ADMIN — Medication 81 MILLIGRAM(S): at 12:21

## 2025-06-15 NOTE — PROGRESS NOTE ADULT - SUBJECTIVE AND OBJECTIVE BOX
Dr. Diana Mendoza  Pager [phone]    PROGRESS NOTE:     Patient is a 70y old  Female who presents with a chief complaint of AMS (14 Jun 2025 14:27)      SUBJECTIVE / OVERNIGHT EVENTS: denies chest pain or sob   ADDITIONAL REVIEW OF SYSTEMS: afebrile     MEDICATIONS  (STANDING):  aspirin enteric coated 81 milliGRAM(s) Oral daily  atorvastatin 40 milliGRAM(s) Oral at bedtime  chlorhexidine 2% Cloths 1 Application(s) Topical daily  enoxaparin Injectable 40 milliGRAM(s) SubCutaneous every 24 hours  magnesium oxide 400 milliGRAM(s) Oral every 6 hours  magnesium oxide 800 milliGRAM(s) Oral once  pantoprazole    Tablet 40 milliGRAM(s) Oral before breakfast  predniSONE   Tablet 10 milliGRAM(s) Oral daily    MEDICATIONS  (PRN):      CAPILLARY BLOOD GLUCOSE        I&O's Summary      PHYSICAL EXAM:  Vital Signs Last 24 Hrs  T(C): 36.7 (15 Avelino 2025 12:28), Max: 36.9 (15 Avelino 2025 00:00)  T(F): 98.1 (15 Avelino 2025 12:28), Max: 98.5 (15 Avelino 2025 00:00)  HR: 65 (15 Avelino 2025 12:28) (62 - 71)  BP: 122/69 (15 Avelino 2025 12:28) (117/74 - 129/60)  BP(mean): --  RR: 20 (15 Avelino 2025 12:28) (16 - 20)  SpO2: 100% (15 Avelino 2025 08:00) (96% - 100%)    Parameters below as of 15 Avelino 2025 12:28  Patient On (Oxygen Delivery Method): nasal cannula  O2 Flow (L/min): 2    CONSTITUTIONAL: nad  RESPIRATORY: CTA b/l  CARDIOVASCULAR: Regular rate and rhythm, normal S1 and S2, no murmur/rub/gallop; No lower extremity edema; Peripheral pulses are 2+ bilaterally  ABDOMEN: Nontender to palpation, normoactive bowel sounds, no rebound/guarding; No hepatosplenomegaly  MUSCULOSKELETAL: no clubbing or cyanosis of digits; no joint swelling or tenderness to palpation  PSYCH: A+O to person, place, and time; affect appropriate    LABS:                        10.9   12.86 )-----------( 168      ( 15 Avelino 2025 09:34 )             36.3     06-15    143  |  106  |  11  ----------------------------<  74  4.0   |  25  |  1.04    Ca    8.4      15 Avelino 2025 09:34  Phos  2.4     06-15  Mg     1.50     06-15            Urinalysis Basic - ( 15 Avelino 2025 09:34 )    Color: x / Appearance: x / SG: x / pH: x  Gluc: 74 mg/dL / Ketone: x  / Bili: x / Urobili: x   Blood: x / Protein: x / Nitrite: x   Leuk Esterase: x / RBC: x / WBC x   Sq Epi: x / Non Sq Epi: x / Bacteria: x        Culture - Blood (collected 12 Jun 2025 20:48)  Source: Blood Blood-Peripheral  Preliminary Report (15 Avelino 2025 11:00):    No growth at 48 Hours    Culture - Blood (collected 12 Jun 2025 20:40)  Source: Blood Blood-Venous  Preliminary Report (15 Avelino 2025 11:00):    No growth at 48 Hours    Urinalysis with Rflx Culture (collected 12 Jun 2025 18:14)        RADIOLOGY & ADDITIONAL TESTS:  Results Reviewed:   Imaging Personally Reviewed:  Electrocardiogram Personally Reviewed:    COORDINATION OF CARE:  Care Discussed with Consultants/Other Providers [Y/N]:  Prior or Outpatient Records Reviewed [Y/N]:

## 2025-06-16 LAB
ANION GAP SERPL CALC-SCNC: 12 MMOL/L — SIGNIFICANT CHANGE UP (ref 7–14)
ANION GAP SERPL CALC-SCNC: 14 MMOL/L — SIGNIFICANT CHANGE UP (ref 7–14)
BASOPHILS # BLD AUTO: 0.07 K/UL — SIGNIFICANT CHANGE UP (ref 0–0.2)
BASOPHILS NFR BLD AUTO: 0.9 % — SIGNIFICANT CHANGE UP (ref 0–2)
BUN SERPL-MCNC: 13 MG/DL — SIGNIFICANT CHANGE UP (ref 7–23)
BUN SERPL-MCNC: 14 MG/DL — SIGNIFICANT CHANGE UP (ref 7–23)
CALCIUM SERPL-MCNC: 8.9 MG/DL — SIGNIFICANT CHANGE UP (ref 8.4–10.5)
CALCIUM SERPL-MCNC: 9.4 MG/DL — SIGNIFICANT CHANGE UP (ref 8.4–10.5)
CHLORIDE SERPL-SCNC: 102 MMOL/L — SIGNIFICANT CHANGE UP (ref 98–107)
CHLORIDE SERPL-SCNC: 103 MMOL/L — SIGNIFICANT CHANGE UP (ref 98–107)
CO2 SERPL-SCNC: 22 MMOL/L — SIGNIFICANT CHANGE UP (ref 22–31)
CO2 SERPL-SCNC: 23 MMOL/L — SIGNIFICANT CHANGE UP (ref 22–31)
CREAT SERPL-MCNC: 0.8 MG/DL — SIGNIFICANT CHANGE UP (ref 0.5–1.3)
CREAT SERPL-MCNC: 0.83 MG/DL — SIGNIFICANT CHANGE UP (ref 0.5–1.3)
EGFR: 77 ML/MIN/1.73M2 — SIGNIFICANT CHANGE UP
EGFR: 77 ML/MIN/1.73M2 — SIGNIFICANT CHANGE UP
EGFR: 80 ML/MIN/1.73M2 — SIGNIFICANT CHANGE UP
EGFR: 80 ML/MIN/1.73M2 — SIGNIFICANT CHANGE UP
EOSINOPHIL # BLD AUTO: 0.51 K/UL — HIGH (ref 0–0.5)
EOSINOPHIL NFR BLD AUTO: 6.5 % — HIGH (ref 0–6)
GAS PNL BLDV: SIGNIFICANT CHANGE UP
GLUCOSE BLDC GLUCOMTR-MCNC: 118 MG/DL — HIGH (ref 70–99)
GLUCOSE SERPL-MCNC: 106 MG/DL — HIGH (ref 70–99)
GLUCOSE SERPL-MCNC: 97 MG/DL — SIGNIFICANT CHANGE UP (ref 70–99)
HCT VFR BLD CALC: 41.5 % — SIGNIFICANT CHANGE UP (ref 34.5–45)
HCT VFR BLD CALC: 45.9 % — HIGH (ref 34.5–45)
HGB BLD-MCNC: 12.8 G/DL — SIGNIFICANT CHANGE UP (ref 11.5–15.5)
HGB BLD-MCNC: 14.1 G/DL — SIGNIFICANT CHANGE UP (ref 11.5–15.5)
IANC: 2.61 K/UL — SIGNIFICANT CHANGE UP (ref 1.8–7.4)
IMM GRANULOCYTES NFR BLD AUTO: 0.4 % — SIGNIFICANT CHANGE UP (ref 0–0.9)
LYMPHOCYTES # BLD AUTO: 3.69 K/UL — HIGH (ref 1–3.3)
LYMPHOCYTES # BLD AUTO: 46.9 % — HIGH (ref 13–44)
MAGNESIUM SERPL-MCNC: 2.1 MG/DL — SIGNIFICANT CHANGE UP (ref 1.6–2.6)
MAGNESIUM SERPL-MCNC: 2.2 MG/DL — SIGNIFICANT CHANGE UP (ref 1.6–2.6)
MCHC RBC-ENTMCNC: 23.9 PG — LOW (ref 27–34)
MCHC RBC-ENTMCNC: 24.1 PG — LOW (ref 27–34)
MCHC RBC-ENTMCNC: 30.7 G/DL — LOW (ref 32–36)
MCHC RBC-ENTMCNC: 30.8 G/DL — LOW (ref 32–36)
MCV RBC AUTO: 77.6 FL — LOW (ref 80–100)
MCV RBC AUTO: 78.5 FL — LOW (ref 80–100)
MONOCYTES # BLD AUTO: 0.96 K/UL — HIGH (ref 0–0.9)
MONOCYTES NFR BLD AUTO: 12.2 % — SIGNIFICANT CHANGE UP (ref 2–14)
NEUTROPHILS # BLD AUTO: 2.61 K/UL — SIGNIFICANT CHANGE UP (ref 1.8–7.4)
NEUTROPHILS NFR BLD AUTO: 33.1 % — LOW (ref 43–77)
NRBC # BLD AUTO: 0 K/UL — SIGNIFICANT CHANGE UP (ref 0–0)
NRBC # BLD AUTO: 0 K/UL — SIGNIFICANT CHANGE UP (ref 0–0)
NRBC # FLD: 0 K/UL — SIGNIFICANT CHANGE UP (ref 0–0)
NRBC # FLD: 0 K/UL — SIGNIFICANT CHANGE UP (ref 0–0)
NRBC BLD AUTO-RTO: 0 /100 WBCS — SIGNIFICANT CHANGE UP (ref 0–0)
NRBC BLD AUTO-RTO: 0 /100 WBCS — SIGNIFICANT CHANGE UP (ref 0–0)
PHOSPHATE SERPL-MCNC: 2.4 MG/DL — LOW (ref 2.5–4.5)
PHOSPHATE SERPL-MCNC: 3.5 MG/DL — SIGNIFICANT CHANGE UP (ref 2.5–4.5)
PLATELET # BLD AUTO: 221 K/UL — SIGNIFICANT CHANGE UP (ref 150–400)
PLATELET # BLD AUTO: 235 K/UL — SIGNIFICANT CHANGE UP (ref 150–400)
POTASSIUM SERPL-MCNC: 3.8 MMOL/L — SIGNIFICANT CHANGE UP (ref 3.5–5.3)
POTASSIUM SERPL-MCNC: 4.3 MMOL/L — SIGNIFICANT CHANGE UP (ref 3.5–5.3)
POTASSIUM SERPL-SCNC: 3.8 MMOL/L — SIGNIFICANT CHANGE UP (ref 3.5–5.3)
POTASSIUM SERPL-SCNC: 4.3 MMOL/L — SIGNIFICANT CHANGE UP (ref 3.5–5.3)
RBC # BLD: 5.35 M/UL — HIGH (ref 3.8–5.2)
RBC # BLD: 5.85 M/UL — HIGH (ref 3.8–5.2)
RBC # FLD: 14.2 % — SIGNIFICANT CHANGE UP (ref 10.3–14.5)
RBC # FLD: 14.3 % — SIGNIFICANT CHANGE UP (ref 10.3–14.5)
SODIUM SERPL-SCNC: 137 MMOL/L — SIGNIFICANT CHANGE UP (ref 135–145)
SODIUM SERPL-SCNC: 139 MMOL/L — SIGNIFICANT CHANGE UP (ref 135–145)
WBC # BLD: 7.73 K/UL — SIGNIFICANT CHANGE UP (ref 3.8–10.5)
WBC # BLD: 7.87 K/UL — SIGNIFICANT CHANGE UP (ref 3.8–10.5)
WBC # FLD AUTO: 7.73 K/UL — SIGNIFICANT CHANGE UP (ref 3.8–10.5)
WBC # FLD AUTO: 7.87 K/UL — SIGNIFICANT CHANGE UP (ref 3.8–10.5)

## 2025-06-16 PROCEDURE — 99232 SBSQ HOSP IP/OBS MODERATE 35: CPT

## 2025-06-16 PROCEDURE — 99233 SBSQ HOSP IP/OBS HIGH 50: CPT

## 2025-06-16 RX ADMIN — PREDNISONE 10 MILLIGRAM(S): 20 TABLET ORAL at 05:01

## 2025-06-16 RX ADMIN — Medication 81 MILLIGRAM(S): at 13:59

## 2025-06-16 RX ADMIN — ENOXAPARIN SODIUM 40 MILLIGRAM(S): 100 INJECTION SUBCUTANEOUS at 05:01

## 2025-06-16 RX ADMIN — Medication 1 APPLICATION(S): at 14:00

## 2025-06-16 RX ADMIN — Medication 40 MILLIGRAM(S): at 05:01

## 2025-06-16 RX ADMIN — ATORVASTATIN CALCIUM 40 MILLIGRAM(S): 80 TABLET, FILM COATED ORAL at 21:25

## 2025-06-16 NOTE — RAPID RESPONSE TEAM SUMMARY - NSSITUATIONBACKGROUNDRRT_GEN_ALL_CORE
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 Admitted to medicine for further management and monitoring. Workup for encephalopathy includes MRI with no acute intracranial hemorrhage or ischemia, possible microvascular type changes and small chronic lacunar infarcts in the left cerebellar hemisphere.     RRT called for decreased responsiveness without episode of myoclonus. At time of eval, patient returned to baseline mental status. Hemodynamically wnl and FSG normal. Patient able to converse and state that she is feeling well and having no concerns. Unable to state details of episode. Given return to baseline mental status, RRT ended. Primary team at bedside to continue workup. Will try to expedite EEG and neurology eval for brief seconds long mental status change.

## 2025-06-16 NOTE — PROGRESS NOTE ADULT - SUBJECTIVE AND OBJECTIVE BOX
America Corona MD  [facility] Division of Hospital Medicine  Pager: [phone]  Available via MS Teams    SUBJECTIVE / OVERNIGHT EVENTS:    had an RRT with numerous episodes of "altered mental status" this morning   Has episodes of staring into space with spacing out, without tonic clonic jerking noted   daughter at bedside as witness as well   vitals otherwise stable throughout these episodes   states she feels out of it afterwards   Neurology made aware     MEDICATIONS  (STANDING):  aspirin enteric coated 81 milliGRAM(s) Oral daily  atorvastatin 40 milliGRAM(s) Oral at bedtime  chlorhexidine 2% Cloths 1 Application(s) Topical daily  enoxaparin Injectable 40 milliGRAM(s) SubCutaneous every 24 hours  pantoprazole    Tablet 40 milliGRAM(s) Oral before breakfast  predniSONE   Tablet 10 milliGRAM(s) Oral daily    MEDICATIONS  (PRN):      I&O's Summary      PHYSICAL EXAM:  Vital Signs Last 24 Hrs  T(C): 36.2 (16 Jun 2025 12:00), Max: 36.9 (15 Avelino 2025 16:00)  T(F): 97.2 (16 Jun 2025 12:00), Max: 98.4 (15 Avelino 2025 16:00)  HR: 77 (16 Jun 2025 12:00) (65 - 85)  BP: 131/60 (16 Jun 2025 12:00) (112/62 - 138/69)  BP(mean): --  RR: 18 (16 Jun 2025 12:00) (18 - 18)  SpO2: 97% (16 Jun 2025 12:00) (96% - 99%)    Parameters below as of 16 Jun 2025 12:00  Patient On (Oxygen Delivery Method): room air      CONSTITUTIONAL: NAD   EYES: conjunctiva and sclera clear  ENMT: Moist oral mucosa   NECK: Supple   RESPIRATORY: Normal respiratory effort; lungs are clear to auscultation bilaterally  CARDIOVASCULAR: Regular rate and rhythm, normal S1 and S2, no murmur/rub/gallop; Peripheral pulses are 2+ bilaterally  ABDOMEN: Nontender to palpation, normoactive bowel sounds, no rebound/guarding   MUSCULOSKELETAL: No lower extremity edema   PSYCH: A+O to person, place, and time; affect appropriate  NEUROLOGY: moves all extremities   SKIN: No rashes    LABS:                        14.1   7.73  )-----------( 235      ( 16 Jun 2025 14:21 )             45.9     06-16    139  |  103  |  14  ----------------------------<  97  3.8   |  22  |  0.80    Ca    8.9      16 Jun 2025 04:55  Phos  3.5     06-16  Mg     2.20     06-16            Urinalysis Basic - ( 16 Jun 2025 04:55 )    Color: x / Appearance: x / SG: x / pH: x  Gluc: 97 mg/dL / Ketone: x  / Bili: x / Urobili: x   Blood: x / Protein: x / Nitrite: x   Leuk Esterase: x / RBC: x / WBC x   Sq Epi: x / Non Sq Epi: x / Bacteria: x            COORDINATION OF CARE:  Communication: discussed plan of care with ACP

## 2025-06-16 NOTE — PROGRESS NOTE ADULT - SUBJECTIVE AND OBJECTIVE BOX
Neurology Progress Note    SUBJECTIVE/OBJECTIVE/INTERVAL EVENTS: Pt reported to have multiple episodes of decreased responsiveness (although pt does appear able to point/nod during episode), no jerking, <1 minute, quickly back to baseline mental status after episode. Similar semiologically to what was noted by previous neurology evaluations.     MEDICATIONS  (STANDING):  aspirin enteric coated 81 milliGRAM(s) Oral daily  atorvastatin 40 milliGRAM(s) Oral at bedtime  chlorhexidine 2% Cloths 1 Application(s) Topical daily  enoxaparin Injectable 40 milliGRAM(s) SubCutaneous every 24 hours  pantoprazole    Tablet 40 milliGRAM(s) Oral before breakfast  predniSONE   Tablet 10 milliGRAM(s) Oral daily    MEDICATIONS  (PRN):      VITALS & EXAMINATION:  Vital Signs Last 24 Hrs  T(C): 36.2 (16 Jun 2025 12:00), Max: 36.9 (15 Avelino 2025 16:00)  T(F): 97.2 (16 Jun 2025 12:00), Max: 98.4 (15 Avelino 2025 16:00)  HR: 77 (16 Jun 2025 12:00) (65 - 85)  BP: 131/60 (16 Jun 2025 12:00) (112/62 - 138/69)  BP(mean): --  RR: 18 (16 Jun 2025 12:00) (18 - 18)  SpO2: 97% (16 Jun 2025 12:00) (96% - 99%)    Parameters below as of 16 Jun 2025 12:00  Patient On (Oxygen Delivery Method): room air    Neurology:  Mental Status - AAox3,  Cranial Nerve - PERRL, VFF, EOMI, V1-V3 symmetric, smile symmetric, full tongue movement b/l   Motor - 5/5 b/l UE and LE  Sensory - SILT b/l    LABORATORY:  CBC                       12.8   7.87  )-----------( 221      ( 16 Jun 2025 04:55 )             41.5     Chem 06-16    139  |  103  |  14  ----------------------------<  97  3.8   |  22  |  0.80    Ca    8.9      16 Jun 2025 04:55  Phos  3.5     06-16  Mg     2.20     06-16      LFTs   Coagulopathy   Lipid Panel 06-13 Chol 219[H] LDL -- HDL 70 Trig 84  A1c   Cardiac enzymes     U/A Urinalysis Basic - ( 16 Jun 2025 04:55 )    Color: x / Appearance: x / SG: x / pH: x  Gluc: 97 mg/dL / Ketone: x  / Bili: x / Urobili: x   Blood: x / Protein: x / Nitrite: x   Leuk Esterase: x / RBC: x / WBC x   Sq Epi: x / Non Sq Epi: x / Bacteria: x      CSF  Immunological  Other    STUDIES & IMAGING: (EEG, CT, MR, U/S, TTE/TONE): Neurology Progress Note    SUBJECTIVE/OBJECTIVE/INTERVAL EVENTS: Pt reported to have multiple episodes of decreased responsiveness (although pt does appear able to point/nod during episode), no jerking, <1 minute, quickly back to baseline mental status after episode. Similar semiologically to what was noted by previous neurology evaluations.     MEDICATIONS  (STANDING):  aspirin enteric coated 81 milliGRAM(s) Oral daily  atorvastatin 40 milliGRAM(s) Oral at bedtime  chlorhexidine 2% Cloths 1 Application(s) Topical daily  enoxaparin Injectable 40 milliGRAM(s) SubCutaneous every 24 hours  pantoprazole    Tablet 40 milliGRAM(s) Oral before breakfast  predniSONE   Tablet 10 milliGRAM(s) Oral daily    MEDICATIONS  (PRN):      VITALS & EXAMINATION:  Vital Signs Last 24 Hrs  T(C): 36.2 (16 Jun 2025 12:00), Max: 36.9 (15 Avelino 2025 16:00)  T(F): 97.2 (16 Jun 2025 12:00), Max: 98.4 (15 Avelino 2025 16:00)  HR: 77 (16 Jun 2025 12:00) (65 - 85)  BP: 131/60 (16 Jun 2025 12:00) (112/62 - 138/69)  BP(mean): --  RR: 18 (16 Jun 2025 12:00) (18 - 18)  SpO2: 97% (16 Jun 2025 12:00) (96% - 99%)    Parameters below as of 16 Jun 2025 12:00  Patient On (Oxygen Delivery Method): room air    Neurology:  Mental Status - AAox3,  Cranial Nerve - PERRL, VFF, EOMI, V1-V3 symmetric, smile symmetric, full tongue movement b/l   Motor - 5/5 b/l UE and LE  Sensory - SILT b/l    LABORATORY:  CBC                       12.8   7.87  )-----------( 221      ( 16 Jun 2025 04:55 )             41.5     Chem 06-16    139  |  103  |  14  ----------------------------<  97  3.8   |  22  |  0.80    Ca    8.9      16 Jun 2025 04:55  Phos  3.5     06-16  Mg     2.20     06-16      LFTs   Coagulopathy   Lipid Panel 06-13 Chol 219[H] LDL -- HDL 70 Trig 84  A1c   Cardiac enzymes     U/A Urinalysis Basic - ( 16 Jun 2025 04:55 )    Color: x / Appearance: x / SG: x / pH: x  Gluc: 97 mg/dL / Ketone: x  / Bili: x / Urobili: x   Blood: x / Protein: x / Nitrite: x   Leuk Esterase: x / RBC: x / WBC x   Sq Epi: x / Non Sq Epi: x / Bacteria: x      CSF  Immunological  Other    STUDIES & IMAGING: (EEG, CT, MR, U/S, TTE/TONE):    MRI brain 6/13/25:  Multiple small rounded nonspecific abnormal white matter foci of T2/FLAIR prolongation statistically favoring microvascular type changes and small chronic lacunar infarcts in the left cerebellar hemisphere.

## 2025-06-16 NOTE — PROGRESS NOTE ADULT - ASSESSMENT
Impression: Stereotyped episodes of decreased verbalization (although patient still able to nod and gesticulate with hands), no generalized shaking, no prolonged postepisode confusion. Higher suspicion for functional etiology, lower suspicion for seizure.    Recommendations:  To be discussed Impression: Stereotyped episodes of decreased verbalization (although patient still able to nod and gesticulate with hands), no generalized shaking, no prolonged postepisode confusion, consistent with previous episodes noted by neurology team. Higher suspicion for functional etiology, lower suspicion for seizure.    Recommendations:  -vEEG to capture event and confirm no epileptic activity (although even without EEG, there is very high clinical suspicion that these are not epileptic seizures).  -No need for MRI at this time  -Rest of workup/management per primary team    Case discussed with neuro attending Dr. Mark Carr

## 2025-06-16 NOTE — PROGRESS NOTE ADULT - ATTENDING COMMENTS
Mr. 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 Neurology consulted for these paroxysmal episodes of decreased responsiveness lasting few seconds at a time. I witnessed two consecutive events on my first encounter with patient. Today, patient had another identical event for which RRT was called and neurology was called to bedside.    Semiology of the events as such:  Patient suddenly partly closes eyes, but still able to nod or shake head to questions, able to follow commands like show thumbs up, and reacts to noxious during this event. She even slowly elevated each arm to command (give me high five). After approximately ten seconds, patient suddenly opens eyes and says "I am back", seemingly back at baseline without any post-ictal features. This semiology does not resemble seizures or really any neurologic entity. Seems like patient had ambulatory EEG which captured event without correlate, however, results not visible to us. Would attempt to capture these events in our EEG records and definitely diagnose as non-epileptiform seizures.     If patient has similar events on the floor, these are not dangerous, would offer reassurance and support. If event is different, please call neurology to bedside.    MRI not needed  VEEG to capture and characterize said event.  no seizure medications  please press event button once EEG connected and if any such event is witnessed .

## 2025-06-17 LAB
ANION GAP SERPL CALC-SCNC: 13 MMOL/L — SIGNIFICANT CHANGE UP (ref 7–14)
BUN SERPL-MCNC: 15 MG/DL — SIGNIFICANT CHANGE UP (ref 7–23)
CALCIUM SERPL-MCNC: 9.1 MG/DL — SIGNIFICANT CHANGE UP (ref 8.4–10.5)
CHLORIDE SERPL-SCNC: 104 MMOL/L — SIGNIFICANT CHANGE UP (ref 98–107)
CO2 SERPL-SCNC: 22 MMOL/L — SIGNIFICANT CHANGE UP (ref 22–31)
CREAT SERPL-MCNC: 0.83 MG/DL — SIGNIFICANT CHANGE UP (ref 0.5–1.3)
EGFR: 77 ML/MIN/1.73M2 — SIGNIFICANT CHANGE UP
EGFR: 77 ML/MIN/1.73M2 — SIGNIFICANT CHANGE UP
GLUCOSE SERPL-MCNC: 86 MG/DL — SIGNIFICANT CHANGE UP (ref 70–99)
HCT VFR BLD CALC: 41.7 % — SIGNIFICANT CHANGE UP (ref 34.5–45)
HGB BLD-MCNC: 12.6 G/DL — SIGNIFICANT CHANGE UP (ref 11.5–15.5)
MAGNESIUM SERPL-MCNC: 1.9 MG/DL — SIGNIFICANT CHANGE UP (ref 1.6–2.6)
MCHC RBC-ENTMCNC: 23.6 PG — LOW (ref 27–34)
MCHC RBC-ENTMCNC: 30.2 G/DL — LOW (ref 32–36)
MCV RBC AUTO: 78.2 FL — LOW (ref 80–100)
NRBC # BLD AUTO: 0 K/UL — SIGNIFICANT CHANGE UP (ref 0–0)
NRBC # FLD: 0 K/UL — SIGNIFICANT CHANGE UP (ref 0–0)
NRBC BLD AUTO-RTO: 0 /100 WBCS — SIGNIFICANT CHANGE UP (ref 0–0)
PHOSPHATE SERPL-MCNC: 3.1 MG/DL — SIGNIFICANT CHANGE UP (ref 2.5–4.5)
PLATELET # BLD AUTO: 211 K/UL — SIGNIFICANT CHANGE UP (ref 150–400)
POTASSIUM SERPL-MCNC: 3.8 MMOL/L — SIGNIFICANT CHANGE UP (ref 3.5–5.3)
POTASSIUM SERPL-SCNC: 3.8 MMOL/L — SIGNIFICANT CHANGE UP (ref 3.5–5.3)
RBC # BLD: 5.33 M/UL — HIGH (ref 3.8–5.2)
RBC # FLD: 14.4 % — SIGNIFICANT CHANGE UP (ref 10.3–14.5)
SODIUM SERPL-SCNC: 139 MMOL/L — SIGNIFICANT CHANGE UP (ref 135–145)
WBC # BLD: 7.4 K/UL — SIGNIFICANT CHANGE UP (ref 3.8–10.5)
WBC # FLD AUTO: 7.4 K/UL — SIGNIFICANT CHANGE UP (ref 3.8–10.5)

## 2025-06-17 PROCEDURE — 99232 SBSQ HOSP IP/OBS MODERATE 35: CPT

## 2025-06-17 PROCEDURE — 95720 EEG PHY/QHP EA INCR W/VEEG: CPT

## 2025-06-17 RX ADMIN — Medication 1 APPLICATION(S): at 11:12

## 2025-06-17 RX ADMIN — ATORVASTATIN CALCIUM 40 MILLIGRAM(S): 80 TABLET, FILM COATED ORAL at 21:05

## 2025-06-17 RX ADMIN — ENOXAPARIN SODIUM 40 MILLIGRAM(S): 100 INJECTION SUBCUTANEOUS at 05:15

## 2025-06-17 RX ADMIN — PREDNISONE 10 MILLIGRAM(S): 20 TABLET ORAL at 05:15

## 2025-06-17 RX ADMIN — Medication 81 MILLIGRAM(S): at 11:11

## 2025-06-17 RX ADMIN — Medication 40 MILLIGRAM(S): at 05:15

## 2025-06-17 NOTE — CHART NOTE - NSCHARTNOTEFT_GEN_A_CORE
EEG preliminary read (not final) on the initial recording hour(s) = 1.5 hr    No epileptiform abnormalities or seizures.    Final report to follow tomorrow morning after completion of study.    EEG Reading Room Ph#: [phone]  Epilepsy Answering Service after 5PM and before 8:30AM: Ph#: [phone]
Unable to evaluate patient after multiple visits  In am, at TTE  In late am/early afternoon, working with PT/OT  In later afternoon, at MRI    Ongoing work up for encephalopathy  Neurology following  Will f/u results of MRI, EEG pending   TTE suggested LVEF 61%, G1DD, PFO  OP PT per PT  No needs per OT  SLP eval pending   Continued home medications
RRT called by RN for episode of unresponsiveness.  Pt seen at bedside during w/ RRT team & Dr. Corona present. On time of arrival pt noted to be coming back to baseline, answering questions appropriately. During RRT 3 additional episodes of decreased responsiveness episodes occured. Episodes characterized no jerking, <1 minute, quickly back to baseline mental status after episode. VSS throughout episodes. STAT CBC, BMP, & VBG ordered. VEEG expeditied. Neuro team at bedside to evaluate patient, appreciate recommendations. Q4hr Neuro check ordered, maintian  & tele monitoring.  Will continue to monitor.     Yahir Lynn PA-C  pager [phone]

## 2025-06-17 NOTE — PROGRESS NOTE ADULT - SUBJECTIVE AND OBJECTIVE BOX
America Corona MD  [facility] Division of Hospital Medicine  Pager: [phone]  Available via MS Teams    SUBJECTIVE / OVERNIGHT EVENTS:    No more AMS spells noted per patient   last AMS spell was during the rapid yesterday   awaiting EEG  Denies to have any pain, including headache or chest pain       MEDICATIONS  (STANDING):  aspirin enteric coated 81 milliGRAM(s) Oral daily  atorvastatin 40 milliGRAM(s) Oral at bedtime  chlorhexidine 2% Cloths 1 Application(s) Topical daily  enoxaparin Injectable 40 milliGRAM(s) SubCutaneous every 24 hours  pantoprazole    Tablet 40 milliGRAM(s) Oral before breakfast  predniSONE   Tablet 10 milliGRAM(s) Oral daily    MEDICATIONS  (PRN):      I&O's Summary      PHYSICAL EXAM:  Vital Signs Last 24 Hrs  T(C): 36.3 (17 Jun 2025 11:50), Max: 37.1 (16 Jun 2025 16:00)  T(F): 97.4 (17 Jun 2025 11:50), Max: 98.7 (16 Jun 2025 16:00)  HR: 64 (17 Jun 2025 11:50) (64 - 87)  BP: 128/81 (17 Jun 2025 11:50) (110/73 - 129/63)  BP(mean): --  RR: 17 (17 Jun 2025 11:50) (17 - 18)  SpO2: 100% (17 Jun 2025 11:50) (96% - 100%)    Parameters below as of 17 Jun 2025 11:50  Patient On (Oxygen Delivery Method): room air      CONSTITUTIONAL: NAD   EYES: conjunctiva and sclera clear  ENMT: Moist oral mucosa   NECK: Supple   RESPIRATORY: Normal respiratory effort; lungs are clear to auscultation bilaterally  CARDIOVASCULAR: Regular rate and rhythm, normal S1 and S2, no murmur/rub/gallop; Peripheral pulses are 2+ bilaterally  ABDOMEN: Nontender to palpation, normoactive bowel sounds, no rebound/guarding   MUSCULOSKELETAL: No lower extremity edema   PSYCH: A+O to person, place, and time; affect appropriate  NEUROLOGY: moves all extremities   SKIN: No rashes    LABS:                        12.6   7.40  )-----------( 211      ( 17 Jun 2025 05:03 )             41.7     06-17    139  |  104  |  15  ----------------------------<  86  3.8   |  22  |  0.83    Ca    9.1      17 Jun 2025 05:03  Phos  3.1     06-17  Mg     1.90     06-17            Urinalysis Basic - ( 17 Jun 2025 05:03 )    Color: x / Appearance: x / SG: x / pH: x  Gluc: 86 mg/dL / Ketone: x  / Bili: x / Urobili: x   Blood: x / Protein: x / Nitrite: x   Leuk Esterase: x / RBC: x / WBC x   Sq Epi: x / Non Sq Epi: x / Bacteria: x            COORDINATION OF CARE:  Communication: discussed plan of care with ACP

## 2025-06-17 NOTE — PROGRESS NOTE ADULT - PROBLEM SELECTOR PLAN 2
-Follows with pulmonology Dr. Abel outpt  -Intermittently uses O2 NC at home   -c/w prednisone 10 mg QD
-Follows with pulmonology outpatient   -Intermittently uses O2 NC at home   -c/w prednisone 10 mg QD
-Follows with pulmonology Dr. Abel outpt  -Intermittently uses O2 NC at home   -c/w prednisone 10 mg QD

## 2025-06-17 NOTE — PROGRESS NOTE ADULT - PROBLEM SELECTOR PLAN 4
VTE PPx: Lovenox  GI PPx: Pantoprazole  Nutrition: Regular Diet  Fluids: PRN  Electrolytes: Maintain K>4, Mag >2, Phos > 3  Access: PIV  Code Status: FULL  Dispo: pending clinical improvement
VTE PPx: Lovenox  GI PPx: Pantoprazole  Nutrition: Regular Diet  Fluids: PRN  Electrolytes: Maintain K>4, Mag >2, Phos > 3  Access: PIV  Code Status: FULL  Dispo: home pending EEG
VTE PPx: Lovenox  GI PPx: Pantoprazole  Nutrition: Regular Diet  Fluids: PRN  Electrolytes: Maintain K>4, Mag >2, Phos > 3  Access: PIV  Code Status: FULL  Dispo: home pending EEG
VTE PPx: Lovenox  GI PPx: Pantoprazole  Nutrition: Regular Diet  Fluids: PRN  Electrolytes: Maintain K>4, Mag >2, Phos > 3  Access: PIV  Code Status: FULL  Dispo: pending EEG
VTE PPx: Lovenox  GI PPx: Pantoprazole  Nutrition: Regular Diet  Fluids: PRN  Electrolytes: Maintain K>4, Mag >2, Phos > 3  Access: PIV  Code Status: FULL  Dispo: home pending EEG

## 2025-06-17 NOTE — PROGRESS NOTE ADULT - PROBLEM SELECTOR PLAN 3
-Continue with home pantoprazole 40 mg QD

## 2025-06-17 NOTE — PROGRESS NOTE ADULT - TIME BILLING
Time-based billing (NON-critical care).     More than 50% of the visit was spent counseling and / or coordinating care by the attending physician.  The necessity of the time spent during the encounter on this date of service was due to:     review of laboratory data, radiology results, consultants' recommendations, documentation in [place], discussion with patient/ACP and interdisciplinary staff (such as , social workers, etc). Interventions were performed as documented above.
Time-based billing (NON-critical care).     More than 50% of the visit was spent counseling and / or coordinating care by the attending physician.  The necessity of the time spent during the encounter on this date of service was due to:     review of laboratory data, radiology results, consultants' recommendations, documentation in [place], discussion with patient/ACP and interdisciplinary staff (such as , social workers, etc). Interventions were performed as documented above.
(including, but not limited to)  - preparing to see the patient (eg, review of tests)   - obtaining and/or reviewing separately obtained history  - performing a medically appropriate examination and/or evaluation   - counseling and educating the patient/family/caregiver    - ordering medications, tests, or procedures   - referring and communicating with other health care professionals (when not separately reported)   - documenting clinical information in the electronic or other health record   - independently interpreting results (not separately reported) and communicating results to the patient/family/caregiver   - care coordination (not separately reported)

## 2025-06-18 ENCOUNTER — TRANSCRIPTION ENCOUNTER (OUTPATIENT)
Age: 70
End: 2025-06-18

## 2025-06-18 VITALS
DIASTOLIC BLOOD PRESSURE: 60 MMHG | RESPIRATION RATE: 15 BRPM | TEMPERATURE: 98 F | SYSTOLIC BLOOD PRESSURE: 137 MMHG | HEART RATE: 76 BPM | OXYGEN SATURATION: 100 %

## 2025-06-18 LAB
ANION GAP SERPL CALC-SCNC: 13 MMOL/L — SIGNIFICANT CHANGE UP (ref 7–14)
BUN SERPL-MCNC: 12 MG/DL — SIGNIFICANT CHANGE UP (ref 7–23)
CALCIUM SERPL-MCNC: 9.9 MG/DL — SIGNIFICANT CHANGE UP (ref 8.4–10.5)
CHLORIDE SERPL-SCNC: 102 MMOL/L — SIGNIFICANT CHANGE UP (ref 98–107)
CO2 SERPL-SCNC: 24 MMOL/L — SIGNIFICANT CHANGE UP (ref 22–31)
CREAT SERPL-MCNC: 0.83 MG/DL — SIGNIFICANT CHANGE UP (ref 0.5–1.3)
CULTURE RESULTS: SIGNIFICANT CHANGE UP
CULTURE RESULTS: SIGNIFICANT CHANGE UP
EGFR: 77 ML/MIN/1.73M2 — SIGNIFICANT CHANGE UP
EGFR: 77 ML/MIN/1.73M2 — SIGNIFICANT CHANGE UP
GLUCOSE SERPL-MCNC: 80 MG/DL — SIGNIFICANT CHANGE UP (ref 70–99)
HCT VFR BLD CALC: 42.1 % — SIGNIFICANT CHANGE UP (ref 34.5–45)
HGB BLD-MCNC: 13.3 G/DL — SIGNIFICANT CHANGE UP (ref 11.5–15.5)
MAGNESIUM SERPL-MCNC: 1.9 MG/DL — SIGNIFICANT CHANGE UP (ref 1.6–2.6)
MCHC RBC-ENTMCNC: 24.2 PG — LOW (ref 27–34)
MCHC RBC-ENTMCNC: 31.6 G/DL — LOW (ref 32–36)
MCV RBC AUTO: 76.5 FL — LOW (ref 80–100)
NRBC # BLD AUTO: 0 K/UL — SIGNIFICANT CHANGE UP (ref 0–0)
NRBC # FLD: 0 K/UL — SIGNIFICANT CHANGE UP (ref 0–0)
NRBC BLD AUTO-RTO: 0 /100 WBCS — SIGNIFICANT CHANGE UP (ref 0–0)
PHOSPHATE SERPL-MCNC: 3.4 MG/DL — SIGNIFICANT CHANGE UP (ref 2.5–4.5)
PLATELET # BLD AUTO: 229 K/UL — SIGNIFICANT CHANGE UP (ref 150–400)
PMV BLD: 10.9 FL — SIGNIFICANT CHANGE UP (ref 7–13)
POTASSIUM SERPL-MCNC: 3.8 MMOL/L — SIGNIFICANT CHANGE UP (ref 3.5–5.3)
POTASSIUM SERPL-SCNC: 3.8 MMOL/L — SIGNIFICANT CHANGE UP (ref 3.5–5.3)
RBC # BLD: 5.5 M/UL — HIGH (ref 3.8–5.2)
RBC # FLD: 14.3 % — SIGNIFICANT CHANGE UP (ref 10.3–14.5)
SODIUM SERPL-SCNC: 139 MMOL/L — SIGNIFICANT CHANGE UP (ref 135–145)
SPECIMEN SOURCE: SIGNIFICANT CHANGE UP
SPECIMEN SOURCE: SIGNIFICANT CHANGE UP
WBC # BLD: 8.65 K/UL — SIGNIFICANT CHANGE UP (ref 3.8–10.5)
WBC # FLD AUTO: 8.65 K/UL — SIGNIFICANT CHANGE UP (ref 3.8–10.5)

## 2025-06-18 PROCEDURE — 99239 HOSP IP/OBS DSCHRG MGMT >30: CPT

## 2025-06-18 PROCEDURE — 99233 SBSQ HOSP IP/OBS HIGH 50: CPT

## 2025-06-18 RX ADMIN — Medication 1 APPLICATION(S): at 11:27

## 2025-06-18 RX ADMIN — Medication 81 MILLIGRAM(S): at 11:27

## 2025-06-18 RX ADMIN — ENOXAPARIN SODIUM 40 MILLIGRAM(S): 100 INJECTION SUBCUTANEOUS at 05:10

## 2025-06-18 RX ADMIN — Medication 40 MILLIGRAM(S): at 05:10

## 2025-06-18 RX ADMIN — PREDNISONE 10 MILLIGRAM(S): 20 TABLET ORAL at 05:10

## 2025-06-18 NOTE — PROGRESS NOTE ADULT - SUBJECTIVE AND OBJECTIVE BOX
Neurology Progress Note    SUBJECTIVE/OBJECTIVE/INTERVAL EVENTS: Patient seen and examined at bedside w/ neuro attending. No events overnight while on EEG.     MEDICATIONS  (STANDING):  aspirin enteric coated 81 milliGRAM(s) Oral daily  atorvastatin 40 milliGRAM(s) Oral at bedtime  chlorhexidine 2% Cloths 1 Application(s) Topical daily  enoxaparin Injectable 40 milliGRAM(s) SubCutaneous every 24 hours  pantoprazole    Tablet 40 milliGRAM(s) Oral before breakfast  predniSONE   Tablet 10 milliGRAM(s) Oral daily    MEDICATIONS  (PRN):      VITALS & EXAMINATION:  Vital Signs Last 24 Hrs  T(C): 36.5 (18 Jun 2025 08:00), Max: 36.9 (18 Jun 2025 04:00)  T(F): 97.7 (18 Jun 2025 08:00), Max: 98.5 (18 Jun 2025 04:00)  HR: 70 (18 Jun 2025 08:00) (62 - 79)  BP: 109/65 (18 Jun 2025 08:00) (109/65 - 134/69)  BP(mean): --  RR: 16 (18 Jun 2025 08:00) (16 - 18)  SpO2: 99% (18 Jun 2025 08:00) (96% - 100%)    Parameters below as of 18 Jun 2025 08:00  Patient On (Oxygen Delivery Method): room air    Neuro Exam:  Mental Status - AAox3,  Cranial Nerve - PERRL, VFF, EOMI, V1-V3 symmetric, smile symmetric, full tongue movement b/l   Motor - 5/5 b/l UE and LE  Sensory - SILT b/l      LABORATORY:  CBC                       13.3   8.65  )-----------( 229      ( 18 Jun 2025 05:00 )             42.1     Chem 06-18    139  |  102  |  12  ----------------------------<  80  3.8   |  24  |  0.83    Ca    9.9      18 Jun 2025 05:00  Phos  3.4     06-18  Mg     1.90     06-18      LFTs   Coagulopathy   Lipid Panel 06-13 Chol 219[H] LDL -- HDL 70 Trig 84  A1c   Cardiac enzymes     U/A Urinalysis Basic - ( 18 Jun 2025 05:00 )    Color: x / Appearance: x / SG: x / pH: x  Gluc: 80 mg/dL / Ketone: x  / Bili: x / Urobili: x   Blood: x / Protein: x / Nitrite: x   Leuk Esterase: x / RBC: x / WBC x   Sq Epi: x / Non Sq Epi: x / Bacteria: x      CSF  Immunological  Other    STUDIES & IMAGING: (EEG, CT, MR, U/S, TTE/TONE):

## 2025-06-18 NOTE — DISCHARGE NOTE PROVIDER - CARE PROVIDER_API CALL
Katy Ruvalcaba  Nurse Practitioner Family  [address], [address]  [place] [address]  Phone: [phone]  Fax: [phone]  Follow Up Time:

## 2025-06-18 NOTE — DISCHARGE NOTE PROVIDER - NSDCFUSCHEDAPPT_GEN_ALL_CORE_FT
Daniel Abel  [facility]  PULED [address]  Scheduled Appointment: 06/25/2025    [facility]  ELECTROPH [address]  Scheduled Appointment: 07/10/2025     [facility]  ELECTROPH [address]  Scheduled Appointment: 07/10/2025    Estiven Becerra  [facility]  CARDIOLOGY [address]  Scheduled Appointment: 07/14/2025    [facility]  [address]PLAB [address]  Scheduled Appointment: 07/31/2025    [facility]  [address]MMED [address]  Scheduled Appointment: 08/20/2025    Daniel Abel  [facility]  [address]MMED [address]  Scheduled Appointment: 08/20/2025     Estiven Becerra  [facility]  CARDIOLOGY [address]  Scheduled Appointment: 07/14/2025    [facility]  [address]AB [address]  Scheduled Appointment: 07/31/2025    [facility]  [address]ED [address]  Scheduled Appointment: 08/20/2025    Daniel Abel  [facility]  [address]  Scheduled Appointment: 08/20/2025

## 2025-06-18 NOTE — DISCHARGE NOTE PROVIDER - NSDCMRMEDTOKEN_GEN_ALL_CORE_FT
aspirin 81 mg oral delayed release tablet: 1 tab(s) orally once a day  atorvastatin 40 mg oral tablet: 1 tab(s) orally once a day (at bedtime)  pantoprazole 40 mg oral delayed release tablet: 1 tab(s) orally once a day (before a meal)  predniSONE 10 mg oral tablet: 1 tab(s) orally once a day   aspirin 81 mg oral delayed release tablet: 1 tab(s) orally once a day  atorvastatin 40 mg oral tablet: 1 tab(s) orally once a day (at bedtime)  pantoprazole 40 mg oral delayed release tablet: 1 tab(s) orally once a day (before a meal)  physical therapy: as needed  predniSONE 10 mg oral tablet: 1 tab(s) orally once a day

## 2025-06-18 NOTE — PROGRESS NOTE ADULT - ATTENDING COMMENTS
Mrs. Case completed close to 24 hours of EEG monitoring. No clincal events captured. EEG was normal, no discharges or interictals. Two of these clinical episodes were witnessed by myself and I feel confident that these are non-epileptiform. Patient suddenly drops head and partly closes eyes, but still following commands and nodding or shaking her head to answer yes or no questions. Was even able to raise arms slowly to command during one of these episodes. Then after about 10 seconds, she suddenly opens her eyes and without any post-ictal features.      No further inpatient neurology workup at this time.  Can follow with general neurology outpatient  Introduced the idea of speaking with mental health/therapist after discharge, patient is contemplative  No AED at this time.

## 2025-06-18 NOTE — DISCHARGE NOTE NURSING/CASE MANAGEMENT/SOCIAL WORK - PATIENT PORTAL LINK FT
You can access the FollowMyHealth Patient Portal offered by [facility] by registering at the following website: [email or web address]. By joining [facility]’s FollowMyHealth portal, you will also be able to view your health information using other applications (apps) compatible with our system.

## 2025-06-18 NOTE — EEG REPORT - NS EEG TEXT BOX
[facility]   COMPREHENSIVE EPILEPSY CENTER   REPORT OF LONG-TERM VIDEO EEG     [facility]: [address], [place] [address], Ph#: [phone]  [facility]: [address], [place] [address], Ph#: [phone]  [facility]: [address] [place] [address], Ph#: [phone]    Patient Name: NÉSTOR SIMPSON  Age and : 70y (59)  MRN #: [id number]  Location: [facility] 4S [address]  Referring Physician: America Corona    Start Time/Date: 15:09 on 2025  End Time/Date: 08:00 on 2025  Duration: 16 hours 48 minutes    _____________________________________________________________  STUDY INFORMATION    EEG Recording Technique:  The patient underwent continuous Video-EEG monitoring, using Telemetry System hardware on the XLTek Digital System. EEG and video data were stored on a computer hard drive with important events saved in digital archive files. The material was reviewed by a physician (electroencephalographer / epileptologist) on a daily basis. Edwar and seizure detection algorithms were utilized and reviewed. An EEG Technician attended to the patient, and was available throughout daytime work hours.  The epilepsy center neurologist was available in person or on call 24-hours per day.    EEG Placement and Labeling of Electrodes:  The EEG was performed utilizing 20 channel referential EEG connections (coronal over temporal over parasagittal montage) using all standard 10-20 electrode placements with EKG, with additional electrodes placed in the inferior temporal region using the modified 10-10 montage electrode placements for elective admissions, or if deemed necessary. Recording was at a sampling rate of 256 samples per second per channel. Time synchronized digital video recording was done simultaneously with EEG recording. A low light infrared camera was used for low light recording.     _____________________________________________________________  HISTORY    Patient is a 70y old  Female who presents with a chief complaint of AMS (2025 10:14)      PERTINENT MEDICATION:  MEDICATIONS  (STANDING):  aspirin enteric coated 81 milliGRAM(s) Oral daily  atorvastatin 40 milliGRAM(s) Oral at bedtime  chlorhexidine 2% Cloths 1 Application(s) Topical daily  enoxaparin Injectable 40 milliGRAM(s) SubCutaneous every 24 hours  pantoprazole    Tablet 40 milliGRAM(s) Oral before breakfast  predniSONE   Tablet 10 milliGRAM(s) Oral daily    _____________________________________________________________  STUDY INTERPRETATION      FINDINGS:      Background:  Continuity: continuous  Symmetry: symmetric  PDR: 9 Hz activity, with amplitude to 40 uV, that attenuated to eye opening.  Low amplitude frontal beta noted in wakefulness.  Reactivity: present  Voltage: normal, mostly 20-150uV  Anterior Posterior Gradient: present  Other background findings: none  Breach: absent    Background Slowing:  Generalized slowing: none was present.  Focal slowing: none was present.    State Changes:   -Drowsiness noted with increased slowing, attenuation of fast activity, vertex transients.  -Present with N2 sleep transients with symmetric spindles and K-complexes.    Sporadic Epileptiform Discharges:    None    Rhythmic and Periodic Patterns (RPPs):  None     Electrographic and Electroclinical seizures:  None    Other Clinical Events:  None    Activation Procedures:   -Hyperventilation was not performed.    -Photic stimulation was performed and did not elicit any abnormalities.      Artifacts:  Intermittent myogenic and movement artifacts were noted.    ECG:  The heart rate on single channel ECG was predominantly ~80 bpm.    Summary:  Normal EEG in the awake / drowsy / asleep state(s).    Clinical Impression:  There were no epileptiform abnormalities recorded.          -------------------------------------------------------------------------------------------------------    Emily Dowell MD  Director, \[facility] [place] [facility]\"" - [facility]    Questions please call [phone]  After hours (5 PM - 8:30 AM) please call the epilepsy answering service at [phone]       [facility]   COMPREHENSIVE EPILEPSY CENTER   REPORT OF LONG-TERM VIDEO EEG     [facility]: [address], [place] [address], Ph#: [phone]  [facility]: [address] [address] [address], Ph#: [phone]  [facility]: [address] [place] [address], Ph#: [phone]    Patient Name: NÉSTOR SIMPSON  Age and : 70y (59)  MRN #: [id number]  Location: [facility] [address]  Referring Physician: America Corona    Start Time/Date: 15:09 on 2025  End Time/Date: 13:58 on 2025  Duration: 22 hours 46 minutes    _____________________________________________________________  STUDY INFORMATION    EEG Recording Technique:  The patient underwent continuous Video-EEG monitoring, using Telemetry System hardware on the XLTek Digital System. EEG and video data were stored on a computer hard drive with important events saved in digital archive files. The material was reviewed by a physician (electroencephalographer / epileptologist) on a daily basis. Edawr and seizure detection algorithms were utilized and reviewed. An EEG Technician attended to the patient, and was available throughout daytime work hours.  The epilepsy center neurologist was available in person or on call 24-hours per day.    EEG Placement and Labeling of Electrodes:  The EEG was performed utilizing 20 channel referential EEG connections (coronal over temporal over parasagittal montage) using all standard 10-20 electrode placements with EKG, with additional electrodes placed in the inferior temporal region using the modified 10-10 montage electrode placements for elective admissions, or if deemed necessary. Recording was at a sampling rate of 256 samples per second per channel. Time synchronized digital video recording was done simultaneously with EEG recording. A low light infrared camera was used for low light recording.     _____________________________________________________________  HISTORY    Patient is a 70y old  Female who presents with a chief complaint of AMS (2025 10:14)      PERTINENT MEDICATION:  MEDICATIONS  (STANDING):  aspirin enteric coated 81 milliGRAM(s) Oral daily  atorvastatin 40 milliGRAM(s) Oral at bedtime  chlorhexidine 2% Cloths 1 Application(s) Topical daily  enoxaparin Injectable 40 milliGRAM(s) SubCutaneous every 24 hours  pantoprazole    Tablet 40 milliGRAM(s) Oral before breakfast  predniSONE   Tablet 10 milliGRAM(s) Oral daily    _____________________________________________________________  STUDY INTERPRETATION      FINDINGS:      Background:  Continuity: continuous  Symmetry: symmetric  PDR: 9 Hz activity, with amplitude to 40 uV, that attenuated to eye opening.  Low amplitude frontal beta noted in wakefulness.  Reactivity: present  Voltage: normal, mostly 20-150uV  Anterior Posterior Gradient: present  Other background findings: none  Breach: absent    Background Slowing:  Generalized slowing: none was present.  Focal slowing: none was present.    State Changes:   -Drowsiness noted with increased slowing, attenuation of fast activity, vertex transients.  -Present with N2 sleep transients with symmetric spindles and K-complexes.    Sporadic Epileptiform Discharges:    None    Rhythmic and Periodic Patterns (RPPs):  None     Electrographic and Electroclinical seizures:  None    Other Clinical Events:  None    Activation Procedures:   -Hyperventilation was not performed.    -Photic stimulation was performed and did not elicit any abnormalities.      Artifacts:  Intermittent myogenic and movement artifacts were noted.    ECG:  The heart rate on single channel ECG was predominantly ~80 bpm.    Summary:  Normal EEG in the awake / drowsy / asleep state(s).    Clinical Impression:  There were no epileptiform abnormalities recorded.          -------------------------------------------------------------------------------------------------------    Emily Dowell MD  Director, [facility] [place] - [facility]    Questions please call [phone]  After hours (5 PM - 8:30 AM) please call the epilepsy answering service at [phone]

## 2025-06-18 NOTE — DISCHARGE NOTE NURSING/CASE MANAGEMENT/SOCIAL WORK - NSDCFUADDAPPT_GEN_ALL_CORE_FT
APPTS ARE READY TO BE MADE: [X] YES    Best Family or Patient Contact (if needed):    Additional Information about above appointments (if needed):    1: Neurology  2: Cardiology   3: Follows with pulmonology Dr. Abel outpt  4: Neurology introduced the idea of speaking with mental health/therapist after discharge, patient is contemplative. Follow up with Psychiatrist if willing    Other comments or requests:

## 2025-06-18 NOTE — DISCHARGE NOTE PROVIDER - NSDCFUADDAPPT_GEN_ALL_CORE_FT
APPTS ARE READY TO BE MADE: [X] YES    Best Family or Patient Contact (if needed):    Additional Information about above appointments (if needed):    1: Neurology  2: Cardiology   3:     Other comments or requests:    APPTS ARE READY TO BE MADE: [X] YES    Best Family or Patient Contact (if needed):    Additional Information about above appointments (if needed):    1: Neurology  2: Cardiology   3: Follows with pulmonology Dr. Abel outpt  4: Neurology introduced the idea of speaking with mental health/therapist after discharge, patient is contemplative. Follow up with Psychiatrist if willing    Other comments or requests:    APPTS ARE READY TO BE MADE: [X] YES    Best Family or Patient Contact (if needed):    Additional Information about above appointments (if needed):    1: Neurology  2: Cardiology   3: Follows with pulmonology Dr. Abel outpt  4: Neurology introduced the idea of speaking with mental health/therapist after discharge, patient is contemplative. Follow up with Psychiatrist if willing    Other comments or requests:     Patient was outreached but did not answer nor could a voicemail be left.   APPTS ARE READY TO BE MADE: [X] YES    Best Family or Patient Contact (if needed):    Additional Information about above appointments (if needed):    1: Neurology  2: Cardiology   3: Follows with pulmonology Dr. Abel outpt  4: Neurology introduced the idea of speaking with mental health/therapist after discharge, patient is contemplative. Follow up with Psychiatrist if willing    Other comments or requests:     neuro - An appointment was scheduled in [facility] for 7/28/25 at 1pm with NP Katy Ruvalcaba at [address], [address], [place]    psych - Patient advised they did not want to proceed with scheduling appointments with this specialist.

## 2025-06-18 NOTE — DISCHARGE NOTE PROVIDER - HOSPITAL COURSE
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     #Acute encephalopathy.   - patient with ongoing, brief periods of altered mentation lasting seconds  -CTH: No acute intracranial hemorrhage, mass effect, or midline shift.  -CTA Head & Neck: No evidence of significant stenosis or occlusion of the bilateral common and internal carotid arteries. Short segment severe stenosis of the left vertebral artery V1 segment. Patent intracranial circulation without flow limiting stenosis. No evidence of aneurysm.  - MRI brain w/o contrast--suggested no acute intracranial hemorrhage or ischemia, possible microvascular type changes and small chronic lacunar infarcts in the left cerebellar hemisphere.  - TTE w/ bubble study--suggested LVEF 61%, G1DD, similar PFO as in 04/2025  -Orthostatic negative  -Passed dysphagia screen  -ECG: No acute ischemic changes. Trop <6  -Continue with Aspirin 81 mg QD  -Atorvastatin 40 mg QHS, titrate to LDL <70  -Neurology consulted. Appreciate Recs. Completed inpatient workup with MRI and EEG. Will need OP follow up for further management   PT/OT: outpt PT, Speech within functional limits     #Cryptogenic organizing pneumonia.   - Follows with pulmonology Dr. Abel outpt  -Intermittently uses O2 NC at home   -c/w prednisone 10 mg QD.    #GERD (gastroesophageal reflux disease).   -Continue with home pantoprazole 40 mg QD.    Medically Stable for Discharge

## 2025-06-18 NOTE — DISCHARGE NOTE PROVIDER - NSFOLLOWUPCLINICS_GEN_ALL_ED_FT
[facility]  Psychiatry  [address] [place] [address]  Phone: [phone]  Fax:

## 2025-06-18 NOTE — PROGRESS NOTE ADULT - ASSESSMENT
Impression: Stereotyped episodes of decreased verbalization (although patient still able to nod and gesticulate with hands), no generalized shaking, no prolonged postepisode confusion, consistent with previous episodes noted by neurology team. High suspicion for functional etiology/PNES, low suspicion for epileptic seizure.    Recommendations:  - vEEG 6/18 negative for epileptiform activity (events not captured). Ok to discontinue. Impression: Stereotyped episodes of decreased verbalization (although patient still able to nod and gesticulate with hands), no generalized shaking, no prolonged postepisode confusion, consistent with previous episodes noted by neurology team. High suspicion for functional etiology/PNES, low suspicion for epileptic seizure.    Recommendations:  - vEEG 6/18 negative for epileptiform activity (events not captured). Please discontinue.  - No need for ASMs  - No further inpatient neuro workup.    Case seen on rounds with neuro attending Dr. Carr

## 2025-06-18 NOTE — DISCHARGE NOTE PROVIDER - NSDCCPCAREPLAN_GEN_ALL_CORE_FT
PRINCIPAL DISCHARGE DIAGNOSIS  Diagnosis: Acute encephalopathy  Assessment and Plan of Treatment: - you presented with altered mental status   - we did a lot of imaging with CT scans and MRIs  - you were found to have some small blood vessel changes on MRI head but no acute stroke was found   - your echo again showed a PFO, you will need continued follow up with a cardiologist for further care   - we also did an EEG test that showed **********  - please continue Aspirin 81 mg daily and Atorvastatin 40 mg at night   - please continue follow up with neurology     PRINCIPAL DISCHARGE DIAGNOSIS  Diagnosis: Acute encephalopathy  Assessment and Plan of Treatment: - you presented with altered mental status   - we did a lot of imaging with CT scans and MRIs  - you were found to have some small blood vessel changes on MRI head but no acute stroke was found   - your echo again showed a PFO, you will need continued follow up with a cardiologist for further care   - we also did an EEG test that did not show any seizure activity  - please continue Aspirin 81 mg daily and Atorvastatin 40 mg at night   - please continue follow up with neurology

## 2025-06-18 NOTE — DISCHARGE NOTE NURSING/CASE MANAGEMENT/SOCIAL WORK - FINANCIAL ASSISTANCE
[facility] provides services at a reduced cost to those who are determined to be eligible through [facility]’s financial assistance program. Information regarding [facility]’s financial assistance program can be found by going to [email or web address] or by calling [phone].

## 2025-06-25 ENCOUNTER — APPOINTMENT (OUTPATIENT)
Dept: PULMONOLOGY | Facility: CLINIC | Age: 70
End: 2025-06-25
Payer: MEDICARE

## 2025-06-25 VITALS
RESPIRATION RATE: 17 BRPM | WEIGHT: 179.5 LBS | OXYGEN SATURATION: 98 % | TEMPERATURE: 98.1 F | SYSTOLIC BLOOD PRESSURE: 107 MMHG | HEIGHT: 61 IN | DIASTOLIC BLOOD PRESSURE: 76 MMHG | BODY MASS INDEX: 33.89 KG/M2 | HEART RATE: 106 BPM

## 2025-06-25 PROBLEM — K21.9 GASTRO-ESOPHAGEAL REFLUX DISEASE WITHOUT ESOPHAGITIS: Chronic | Status: ACTIVE | Noted: 2025-06-12

## 2025-06-25 PROBLEM — I65.29 CAROTID STENOSIS: Status: ACTIVE | Noted: 2025-06-25

## 2025-06-25 PROCEDURE — G2211 COMPLEX E/M VISIT ADD ON: CPT

## 2025-06-25 PROCEDURE — 99214 OFFICE O/P EST MOD 30 MIN: CPT | Mod: GC

## 2025-06-26 RX ORDER — ATORVASTATIN CALCIUM 40 MG/1
40 TABLET, FILM COATED ORAL DAILY
Qty: 90 | Refills: 3 | Status: ACTIVE | COMMUNITY
Start: 2025-06-25 | End: 1900-01-01

## 2025-07-01 ENCOUNTER — NON-APPOINTMENT (OUTPATIENT)
Age: 70
End: 2025-07-01

## 2025-07-08 ENCOUNTER — APPOINTMENT (OUTPATIENT)
Dept: CARDIOLOGY | Facility: CLINIC | Age: 70
End: 2025-07-08

## 2025-07-08 PROBLEM — R42 DIZZINESS: Status: ACTIVE | Noted: 2025-07-08

## 2025-07-08 RX ORDER — ASPIRIN 81 MG/1
81 TABLET, DELAYED RELEASE ORAL
Qty: 90 | Refills: 3 | Status: ACTIVE | COMMUNITY

## 2025-07-10 ENCOUNTER — APPOINTMENT (OUTPATIENT)
Dept: ELECTROPHYSIOLOGY | Facility: CLINIC | Age: 70
End: 2025-07-10

## 2025-07-10 ENCOUNTER — NON-APPOINTMENT (OUTPATIENT)
Age: 70
End: 2025-07-10

## 2025-07-10 PROCEDURE — 93298 REM INTERROG DEV EVAL SCRMS: CPT

## 2025-07-14 ENCOUNTER — APPOINTMENT (OUTPATIENT)
Dept: CARDIOLOGY | Facility: CLINIC | Age: 70
End: 2025-07-14
Payer: MEDICARE

## 2025-07-14 VITALS
HEART RATE: 81 BPM | WEIGHT: 179 LBS | DIASTOLIC BLOOD PRESSURE: 79 MMHG | OXYGEN SATURATION: 98 % | SYSTOLIC BLOOD PRESSURE: 120 MMHG | HEIGHT: 61 IN | BODY MASS INDEX: 33.79 KG/M2

## 2025-07-14 PROCEDURE — 99204 OFFICE O/P NEW MOD 45 MIN: CPT | Mod: 25

## 2025-07-14 PROCEDURE — 93000 ELECTROCARDIOGRAM COMPLETE: CPT

## 2025-07-28 ENCOUNTER — APPOINTMENT (OUTPATIENT)
Dept: NEUROLOGY | Facility: CLINIC | Age: 70
End: 2025-07-28

## 2025-07-30 ENCOUNTER — NON-APPOINTMENT (OUTPATIENT)
Age: 70
End: 2025-07-30

## 2025-07-31 ENCOUNTER — APPOINTMENT (OUTPATIENT)
Dept: SLEEP CENTER | Facility: CLINIC | Age: 70
End: 2025-07-31
Payer: MEDICARE

## 2025-07-31 ENCOUNTER — OUTPATIENT (OUTPATIENT)
Dept: OUTPATIENT SERVICES | Facility: HOSPITAL | Age: 70
LOS: 1 days | End: 2025-07-31
Payer: COMMERCIAL

## 2025-07-31 PROCEDURE — 95800 SLP STDY UNATTENDED: CPT

## 2025-07-31 PROCEDURE — 95800 SLP STDY UNATTENDED: CPT | Mod: 26

## 2025-08-04 ENCOUNTER — OUTPATIENT (OUTPATIENT)
Dept: OUTPATIENT SERVICES | Facility: HOSPITAL | Age: 70
LOS: 1 days | End: 2025-08-04
Payer: MEDICARE

## 2025-08-04 ENCOUNTER — RESULT REVIEW (OUTPATIENT)
Age: 70
End: 2025-08-04

## 2025-08-04 VITALS
SYSTOLIC BLOOD PRESSURE: 136 MMHG | DIASTOLIC BLOOD PRESSURE: 74 MMHG | HEART RATE: 65 BPM | RESPIRATION RATE: 16 BRPM | OXYGEN SATURATION: 97 %

## 2025-08-04 VITALS
DIASTOLIC BLOOD PRESSURE: 79 MMHG | RESPIRATION RATE: 16 BRPM | HEART RATE: 52 BPM | WEIGHT: 179.9 LBS | HEIGHT: 62 IN | SYSTOLIC BLOOD PRESSURE: 141 MMHG | TEMPERATURE: 98 F | OXYGEN SATURATION: 100 %

## 2025-08-04 DIAGNOSIS — Q21.12 PATENT FORAMEN OVALE: ICD-10-CM

## 2025-08-04 PROCEDURE — 93312 ECHO TRANSESOPHAGEAL: CPT | Mod: 26

## 2025-08-04 PROCEDURE — 93320 DOPPLER ECHO COMPLETE: CPT | Mod: 26,GC

## 2025-08-04 PROCEDURE — 93325 DOPPLER ECHO COLOR FLOW MAPG: CPT | Mod: 26,GC

## 2025-08-04 RX ORDER — ACETAMINOPHEN 500 MG/5ML
650 LIQUID (ML) ORAL ONCE
Refills: 0 | Status: COMPLETED | OUTPATIENT
Start: 2025-08-04 | End: 2025-08-04

## 2025-08-04 RX ORDER — OMEPRAZOLE 20 MG/1
1 CAPSULE, DELAYED RELEASE ORAL
Refills: 0 | DISCHARGE

## 2025-08-04 RX ADMIN — Medication 650 MILLIGRAM(S): at 11:42

## 2025-08-06 ENCOUNTER — APPOINTMENT (OUTPATIENT)
Dept: CARDIOLOGY | Facility: CLINIC | Age: 70
End: 2025-08-06
Payer: MEDICARE

## 2025-08-06 VITALS
SYSTOLIC BLOOD PRESSURE: 128 MMHG | OXYGEN SATURATION: 98 % | DIASTOLIC BLOOD PRESSURE: 76 MMHG | HEART RATE: 69 BPM | TEMPERATURE: 97.7 F

## 2025-08-06 DIAGNOSIS — Q21.12 PATENT FORAMEN OVALE: ICD-10-CM

## 2025-08-06 PROCEDURE — 93000 ELECTROCARDIOGRAM COMPLETE: CPT

## 2025-08-06 PROCEDURE — 99214 OFFICE O/P EST MOD 30 MIN: CPT | Mod: 25

## 2025-08-10 ENCOUNTER — EMERGENCY (EMERGENCY)
Facility: HOSPITAL | Age: 70
LOS: 0 days | Discharge: HOME | End: 2025-08-10
Attending: STUDENT IN AN ORGANIZED HEALTH CARE EDUCATION/TRAINING PROGRAM
Payer: MEDICARE

## 2025-08-10 VITALS
DIASTOLIC BLOOD PRESSURE: 79 MMHG | OXYGEN SATURATION: 96 % | TEMPERATURE: 98 F | HEART RATE: 68 BPM | SYSTOLIC BLOOD PRESSURE: 127 MMHG | RESPIRATION RATE: 18 BRPM

## 2025-08-10 VITALS
OXYGEN SATURATION: 97 % | HEIGHT: 62 IN | SYSTOLIC BLOOD PRESSURE: 158 MMHG | WEIGHT: 186.07 LBS | RESPIRATION RATE: 18 BRPM | HEART RATE: 77 BPM | DIASTOLIC BLOOD PRESSURE: 87 MMHG | TEMPERATURE: 98 F

## 2025-08-10 DIAGNOSIS — R47.01 APHASIA: ICD-10-CM

## 2025-08-10 DIAGNOSIS — Z87.01 PERSONAL HISTORY OF PNEUMONIA (RECURRENT): ICD-10-CM

## 2025-08-10 DIAGNOSIS — R42 DIZZINESS AND GIDDINESS: ICD-10-CM

## 2025-08-10 DIAGNOSIS — R40.4 TRANSIENT ALTERATION OF AWARENESS: ICD-10-CM

## 2025-08-10 DIAGNOSIS — K21.9 GASTRO-ESOPHAGEAL REFLUX DISEASE WITHOUT ESOPHAGITIS: ICD-10-CM

## 2025-08-10 LAB
ALBUMIN SERPL ELPH-MCNC: 3.5 G/DL — SIGNIFICANT CHANGE UP (ref 3.3–5)
ALP SERPL-CCNC: 87 U/L — SIGNIFICANT CHANGE UP (ref 40–120)
ALT FLD-CCNC: 67 U/L — SIGNIFICANT CHANGE UP (ref 12–78)
ANION GAP SERPL CALC-SCNC: 6 MMOL/L — SIGNIFICANT CHANGE UP (ref 5–17)
APTT BLD: 29.3 SEC — SIGNIFICANT CHANGE UP (ref 26.1–36.8)
AST SERPL-CCNC: 27 U/L — SIGNIFICANT CHANGE UP (ref 15–37)
BASOPHILS # BLD AUTO: 0.06 K/UL — SIGNIFICANT CHANGE UP (ref 0–0.2)
BASOPHILS NFR BLD AUTO: 0.7 % — SIGNIFICANT CHANGE UP (ref 0–2)
BILIRUB SERPL-MCNC: 0.4 MG/DL — SIGNIFICANT CHANGE UP (ref 0.2–1.2)
BUN SERPL-MCNC: 14 MG/DL — SIGNIFICANT CHANGE UP (ref 7–23)
CALCIUM SERPL-MCNC: 9.6 MG/DL — SIGNIFICANT CHANGE UP (ref 8.5–10.1)
CHLORIDE SERPL-SCNC: 107 MMOL/L — SIGNIFICANT CHANGE UP (ref 96–108)
CO2 SERPL-SCNC: 28 MMOL/L — SIGNIFICANT CHANGE UP (ref 22–31)
CREAT SERPL-MCNC: 1.06 MG/DL — SIGNIFICANT CHANGE UP (ref 0.5–1.3)
EGFR: 57 ML/MIN/1.73M2 — LOW
EGFR: 57 ML/MIN/1.73M2 — LOW
EOSINOPHIL # BLD AUTO: 0.21 K/UL — SIGNIFICANT CHANGE UP (ref 0–0.5)
EOSINOPHIL NFR BLD AUTO: 2.5 % — SIGNIFICANT CHANGE UP (ref 0–6)
GLUCOSE SERPL-MCNC: 83 MG/DL — SIGNIFICANT CHANGE UP (ref 70–99)
HCT VFR BLD CALC: 45.8 % — HIGH (ref 34.5–45)
HGB BLD-MCNC: 14.1 G/DL — SIGNIFICANT CHANGE UP (ref 11.5–15.5)
IMM GRANULOCYTES NFR BLD AUTO: 0.5 % — SIGNIFICANT CHANGE UP (ref 0–0.9)
INR BLD: 0.94 RATIO — SIGNIFICANT CHANGE UP (ref 0.85–1.16)
LYMPHOCYTES # BLD AUTO: 1.98 K/UL — SIGNIFICANT CHANGE UP (ref 1–3.3)
LYMPHOCYTES # BLD AUTO: 23.2 % — SIGNIFICANT CHANGE UP (ref 13–44)
MCHC RBC-ENTMCNC: 24.2 PG — LOW (ref 27–34)
MCHC RBC-ENTMCNC: 30.8 G/DL — LOW (ref 32–36)
MCV RBC AUTO: 78.7 FL — LOW (ref 80–100)
MONOCYTES # BLD AUTO: 0.45 K/UL — SIGNIFICANT CHANGE UP (ref 0–0.9)
MONOCYTES NFR BLD AUTO: 5.3 % — SIGNIFICANT CHANGE UP (ref 2–14)
NEUTROPHILS # BLD AUTO: 5.78 K/UL — SIGNIFICANT CHANGE UP (ref 1.8–7.4)
NEUTROPHILS NFR BLD AUTO: 67.8 % — SIGNIFICANT CHANGE UP (ref 43–77)
NRBC BLD AUTO-RTO: 0 /100 WBCS — SIGNIFICANT CHANGE UP (ref 0–0)
PLATELET # BLD AUTO: 210 K/UL — SIGNIFICANT CHANGE UP (ref 150–400)
POTASSIUM SERPL-MCNC: 3.7 MMOL/L — SIGNIFICANT CHANGE UP (ref 3.5–5.3)
POTASSIUM SERPL-SCNC: 3.7 MMOL/L — SIGNIFICANT CHANGE UP (ref 3.5–5.3)
PROT SERPL-MCNC: 7.9 GM/DL — SIGNIFICANT CHANGE UP (ref 6–8.3)
PROTHROM AB SERPL-ACNC: 10.6 SEC — SIGNIFICANT CHANGE UP (ref 9.9–13.4)
RBC # BLD: 5.82 M/UL — HIGH (ref 3.8–5.2)
RBC # FLD: 15.2 % — HIGH (ref 10.3–14.5)
SODIUM SERPL-SCNC: 141 MMOL/L — SIGNIFICANT CHANGE UP (ref 135–145)
TROPONIN I, HIGH SENSITIVITY RESULT: 5.8 NG/L — SIGNIFICANT CHANGE UP
WBC # BLD: 8.52 K/UL — SIGNIFICANT CHANGE UP (ref 3.8–10.5)
WBC # FLD AUTO: 8.52 K/UL — SIGNIFICANT CHANGE UP (ref 3.8–10.5)

## 2025-08-10 PROCEDURE — 70496 CT ANGIOGRAPHY HEAD: CPT | Mod: 26

## 2025-08-10 PROCEDURE — 71045 X-RAY EXAM CHEST 1 VIEW: CPT | Mod: 26

## 2025-08-10 PROCEDURE — 93010 ELECTROCARDIOGRAM REPORT: CPT

## 2025-08-10 PROCEDURE — 99284 EMERGENCY DEPT VISIT MOD MDM: CPT

## 2025-08-10 PROCEDURE — 70450 CT HEAD/BRAIN W/O DYE: CPT | Mod: 26,XU

## 2025-08-10 PROCEDURE — 0042T: CPT

## 2025-08-10 PROCEDURE — 70498 CT ANGIOGRAPHY NECK: CPT | Mod: 26

## 2025-08-13 ENCOUNTER — NON-APPOINTMENT (OUTPATIENT)
Age: 70
End: 2025-08-13

## 2025-08-13 ENCOUNTER — APPOINTMENT (OUTPATIENT)
Dept: ELECTROPHYSIOLOGY | Facility: CLINIC | Age: 70
End: 2025-08-13
Payer: MEDICARE

## 2025-08-13 PROCEDURE — 93298 REM INTERROG DEV EVAL SCRMS: CPT

## 2025-08-20 ENCOUNTER — APPOINTMENT (OUTPATIENT)
Dept: PULMONOLOGY | Facility: CLINIC | Age: 70
End: 2025-08-20
Payer: MEDICARE

## 2025-08-20 ENCOUNTER — RX RENEWAL (OUTPATIENT)
Age: 70
End: 2025-08-20

## 2025-08-20 VITALS
OXYGEN SATURATION: 95 % | HEART RATE: 71 BPM | DIASTOLIC BLOOD PRESSURE: 83 MMHG | HEIGHT: 61 IN | WEIGHT: 186 LBS | SYSTOLIC BLOOD PRESSURE: 131 MMHG | BODY MASS INDEX: 35.12 KG/M2

## 2025-08-20 DIAGNOSIS — R55 SYNCOPE AND COLLAPSE: ICD-10-CM

## 2025-08-20 DIAGNOSIS — J84.116 CRYPTOGENIC ORGANIZING PNEUMONIA: ICD-10-CM

## 2025-08-20 DIAGNOSIS — Q21.12 PATENT FORAMEN OVALE: ICD-10-CM

## 2025-08-20 PROCEDURE — 94010 BREATHING CAPACITY TEST: CPT

## 2025-08-20 PROCEDURE — 94729 DIFFUSING CAPACITY: CPT

## 2025-08-20 PROCEDURE — 99214 OFFICE O/P EST MOD 30 MIN: CPT | Mod: 25,GC

## 2025-08-20 PROCEDURE — 94799 UNLISTED PULMONARY SVC/PX: CPT

## 2025-08-20 PROCEDURE — 94726 PLETHYSMOGRAPHY LUNG VOLUMES: CPT

## 2025-08-20 PROCEDURE — ZZZZZ: CPT

## 2025-08-26 DIAGNOSIS — G47.33 OBSTRUCTIVE SLEEP APNEA (ADULT) (PEDIATRIC): ICD-10-CM

## 2025-08-28 ENCOUNTER — NON-APPOINTMENT (OUTPATIENT)
Age: 70
End: 2025-08-28

## 2025-09-01 ENCOUNTER — NON-APPOINTMENT (OUTPATIENT)
Age: 70
End: 2025-09-01

## 2025-09-15 ENCOUNTER — NON-APPOINTMENT (OUTPATIENT)
Age: 70
End: 2025-09-15

## 2025-09-15 ENCOUNTER — APPOINTMENT (OUTPATIENT)
Dept: ELECTROPHYSIOLOGY | Facility: CLINIC | Age: 70
End: 2025-09-15
Payer: MEDICARE

## 2025-09-15 PROCEDURE — 93298 REM INTERROG DEV EVAL SCRMS: CPT

## 2025-09-18 ENCOUNTER — LABORATORY RESULT (OUTPATIENT)
Age: 70
End: 2025-09-18

## 2025-09-18 ENCOUNTER — APPOINTMENT (OUTPATIENT)
Dept: PULMONOLOGY | Facility: CLINIC | Age: 70
End: 2025-09-18
Payer: MEDICARE

## 2025-09-18 ENCOUNTER — APPOINTMENT (OUTPATIENT)
Dept: CARDIOTHORACIC SURGERY | Facility: CLINIC | Age: 70
End: 2025-09-18
Payer: MEDICARE

## 2025-09-18 VITALS
RESPIRATION RATE: 16 BRPM | OXYGEN SATURATION: 94 % | WEIGHT: 189 LBS | BODY MASS INDEX: 35.68 KG/M2 | DIASTOLIC BLOOD PRESSURE: 73 MMHG | HEIGHT: 61 IN | HEART RATE: 80 BPM | SYSTOLIC BLOOD PRESSURE: 122 MMHG

## 2025-09-18 VITALS
OXYGEN SATURATION: 90 % | HEART RATE: 73 BPM | WEIGHT: 189 LBS | DIASTOLIC BLOOD PRESSURE: 77 MMHG | BODY MASS INDEX: 35.68 KG/M2 | SYSTOLIC BLOOD PRESSURE: 131 MMHG | HEIGHT: 61 IN

## 2025-09-18 DIAGNOSIS — J84.116 CRYPTOGENIC ORGANIZING PNEUMONIA: ICD-10-CM

## 2025-09-18 DIAGNOSIS — I63.9 CEREBRAL INFARCTION, UNSPECIFIED: ICD-10-CM

## 2025-09-18 DIAGNOSIS — Q21.12 PATENT FORAMEN OVALE: ICD-10-CM

## 2025-09-18 DIAGNOSIS — E78.5 HYPERLIPIDEMIA, UNSPECIFIED: ICD-10-CM

## 2025-09-18 PROCEDURE — 99205 OFFICE O/P NEW HI 60 MIN: CPT

## 2025-09-18 PROCEDURE — 99214 OFFICE O/P EST MOD 30 MIN: CPT | Mod: 25

## 2025-09-18 PROCEDURE — G2211 COMPLEX E/M VISIT ADD ON: CPT

## 2025-09-19 LAB
BASOPHILS # BLD AUTO: 0.06 K/UL
BASOPHILS NFR BLD AUTO: 0.6 %
ENA SCL70 AB SER-ACNC: 0.3 AL
ENA SS-A AB SER-ACNC: <0.2 AL
ENA SS-B AB SER-ACNC: <0.2 AL
EOSINOPHIL # BLD AUTO: 0.17 K/UL
EOSINOPHIL NFR BLD AUTO: 1.7 %
GBM AB TITR SER IF: <0.2 AL
HCT VFR BLD CALC: 46 %
HGB BLD-MCNC: 14 G/DL
IMM GRANULOCYTES NFR BLD AUTO: 0.2 %
LYMPHOCYTES # BLD AUTO: 1.97 K/UL
LYMPHOCYTES NFR BLD AUTO: 19.4 %
Lab: NEGATIVE
MAN DIFF?: NORMAL
MCHC RBC-ENTMCNC: 24.2 PG
MCHC RBC-ENTMCNC: 30.4 G/DL
MCV RBC AUTO: 79.6 FL
MONOCYTES # BLD AUTO: 0.48 K/UL
MONOCYTES NFR BLD AUTO: 4.7 %
MYELOPEROXIDASE AB SER QL IA: <0.2 AL
MYELOPEROXIDASE CELLS FLD QL: NEGATIVE
NEUTROPHILS # BLD AUTO: 7.45 K/UL
NEUTROPHILS NFR BLD AUTO: 73.4 %
PLATELET # BLD AUTO: 208 K/UL
PROTEINASE3 AB SER IA-ACNC: <0.2 AL
PROTEINASE3 AB SER-ACNC: NEGATIVE
RBC # BLD: 5.78 M/UL
RBC # FLD: 15.9 %
WBC # FLD AUTO: 10.15 K/UL

## 2025-09-21 PROBLEM — I63.9 STROKE: Status: ACTIVE | Noted: 2025-09-21

## 2025-09-21 PROBLEM — E78.5 DYSLIPIDEMIA: Status: ACTIVE | Noted: 2025-09-21
